# Patient Record
Sex: FEMALE | Race: WHITE | NOT HISPANIC OR LATINO | URBAN - METROPOLITAN AREA
[De-identification: names, ages, dates, MRNs, and addresses within clinical notes are randomized per-mention and may not be internally consistent; named-entity substitution may affect disease eponyms.]

---

## 2017-07-20 ENCOUNTER — EMERGENCY (EMERGENCY)
Facility: HOSPITAL | Age: 67
LOS: 1 days | Discharge: OTHER INSTITUTION | End: 2017-07-20
Attending: EMERGENCY MEDICINE | Admitting: EMERGENCY MEDICINE
Payer: MEDICARE

## 2017-07-20 ENCOUNTER — INPATIENT (INPATIENT)
Facility: HOSPITAL | Age: 67
LOS: 7 days | Discharge: NURSING, REHAB OR LONG-TERM CARE | DRG: 871 | End: 2017-07-28
Attending: SPECIALIST | Admitting: SPECIALIST
Payer: MEDICARE

## 2017-07-20 VITALS
SYSTOLIC BLOOD PRESSURE: 129 MMHG | HEART RATE: 118 BPM | TEMPERATURE: 99 F | RESPIRATION RATE: 18 BRPM | OXYGEN SATURATION: 94 % | DIASTOLIC BLOOD PRESSURE: 67 MMHG

## 2017-07-20 VITALS
OXYGEN SATURATION: 97 % | SYSTOLIC BLOOD PRESSURE: 145 MMHG | DIASTOLIC BLOOD PRESSURE: 78 MMHG | HEART RATE: 127 BPM | TEMPERATURE: 103 F | RESPIRATION RATE: 18 BRPM

## 2017-07-20 VITALS
SYSTOLIC BLOOD PRESSURE: 139 MMHG | RESPIRATION RATE: 18 BRPM | DIASTOLIC BLOOD PRESSURE: 73 MMHG | OXYGEN SATURATION: 94 % | HEART RATE: 128 BPM

## 2017-07-20 DIAGNOSIS — G93.89 OTHER SPECIFIED DISORDERS OF BRAIN: ICD-10-CM

## 2017-07-20 DIAGNOSIS — Z88.0 ALLERGY STATUS TO PENICILLIN: ICD-10-CM

## 2017-07-20 DIAGNOSIS — H92.01 OTALGIA, RIGHT EAR: ICD-10-CM

## 2017-07-20 DIAGNOSIS — Z88.2 ALLERGY STATUS TO SULFONAMIDES: ICD-10-CM

## 2017-07-20 DIAGNOSIS — H70.91 UNSPECIFIED MASTOIDITIS, RIGHT EAR: ICD-10-CM

## 2017-07-20 LAB
ALBUMIN SERPL ELPH-MCNC: 3.6 G/DL — SIGNIFICANT CHANGE UP (ref 3.4–5)
ALP SERPL-CCNC: 75 U/L — SIGNIFICANT CHANGE UP (ref 40–120)
ALT FLD-CCNC: 24 U/L — SIGNIFICANT CHANGE UP (ref 12–42)
ANION GAP SERPL CALC-SCNC: 9 MMOL/L — SIGNIFICANT CHANGE UP (ref 9–16)
APPEARANCE UR: CLEAR — SIGNIFICANT CHANGE UP
APTT BLD: 31.8 SEC — SIGNIFICANT CHANGE UP (ref 27.5–36.5)
AST SERPL-CCNC: 16 U/L — SIGNIFICANT CHANGE UP (ref 15–37)
BASOPHILS NFR BLD AUTO: 0.4 % — SIGNIFICANT CHANGE UP (ref 0–2)
BILIRUB SERPL-MCNC: 1.3 MG/DL — HIGH (ref 0.2–1.2)
BILIRUB UR-MCNC: NEGATIVE — SIGNIFICANT CHANGE UP
BUN SERPL-MCNC: 10 MG/DL — SIGNIFICANT CHANGE UP (ref 7–23)
CALCIUM SERPL-MCNC: 9.3 MG/DL — SIGNIFICANT CHANGE UP (ref 8.5–10.5)
CHLORIDE SERPL-SCNC: 99 MMOL/L — SIGNIFICANT CHANGE UP (ref 96–108)
CO2 SERPL-SCNC: 26 MMOL/L — SIGNIFICANT CHANGE UP (ref 22–31)
COLOR SPEC: YELLOW — SIGNIFICANT CHANGE UP
CREAT SERPL-MCNC: 0.81 MG/DL — SIGNIFICANT CHANGE UP (ref 0.5–1.3)
DIFF PNL FLD: (no result)
EOSINOPHIL NFR BLD AUTO: 0 % — SIGNIFICANT CHANGE UP (ref 0–6)
GLUCOSE SERPL-MCNC: 118 MG/DL — HIGH (ref 70–99)
GLUCOSE UR QL: NEGATIVE — SIGNIFICANT CHANGE UP
GRAM STN FLD: SIGNIFICANT CHANGE UP
GRAM STN FLD: SIGNIFICANT CHANGE UP
HCT VFR BLD CALC: 40.9 % — SIGNIFICANT CHANGE UP (ref 34.5–45)
HGB BLD-MCNC: 13.8 G/DL — SIGNIFICANT CHANGE UP (ref 11.5–15.5)
IMM GRANULOCYTES NFR BLD AUTO: 0.7 % — SIGNIFICANT CHANGE UP (ref 0–1.5)
INR BLD: 1.07 — SIGNIFICANT CHANGE UP (ref 0.88–1.16)
KETONES UR-MCNC: NEGATIVE — SIGNIFICANT CHANGE UP
LACTATE SERPL-SCNC: 1.4 MMOL/L — SIGNIFICANT CHANGE UP (ref 0.4–2)
LEUKOCYTE ESTERASE UR-ACNC: NEGATIVE — SIGNIFICANT CHANGE UP
LYMPHOCYTES # BLD AUTO: 6.2 % — LOW (ref 13–44)
MCHC RBC-ENTMCNC: 28.2 PG — SIGNIFICANT CHANGE UP (ref 27–34)
MCHC RBC-ENTMCNC: 33.7 G/DL — SIGNIFICANT CHANGE UP (ref 32–36)
MCV RBC AUTO: 83.6 FL — SIGNIFICANT CHANGE UP (ref 80–100)
MONOCYTES NFR BLD AUTO: 2 % — SIGNIFICANT CHANGE UP (ref 2–14)
NEUTROPHILS NFR BLD AUTO: 90.7 % — HIGH (ref 43–77)
NITRITE UR-MCNC: NEGATIVE — SIGNIFICANT CHANGE UP
PH UR: 5 — SIGNIFICANT CHANGE UP (ref 5–8)
PLATELET # BLD AUTO: 144 K/UL — LOW (ref 150–400)
POTASSIUM SERPL-MCNC: 3.8 MMOL/L — SIGNIFICANT CHANGE UP (ref 3.5–5.3)
POTASSIUM SERPL-SCNC: 3.8 MMOL/L — SIGNIFICANT CHANGE UP (ref 3.5–5.3)
PROT SERPL-MCNC: 8.4 G/DL — HIGH (ref 6.4–8.2)
PROT UR-MCNC: NEGATIVE MG/DL — SIGNIFICANT CHANGE UP
PROTHROM AB SERPL-ACNC: 11.8 SEC — SIGNIFICANT CHANGE UP (ref 9.8–12.7)
RBC # BLD: 4.89 M/UL — SIGNIFICANT CHANGE UP (ref 3.8–5.2)
RBC # FLD: 13 % — SIGNIFICANT CHANGE UP (ref 10.3–16.9)
SODIUM SERPL-SCNC: 134 MMOL/L — SIGNIFICANT CHANGE UP (ref 132–145)
SP GR SPEC: 1.01 — SIGNIFICANT CHANGE UP (ref 1–1.03)
SPECIMEN SOURCE: SIGNIFICANT CHANGE UP
SPECIMEN SOURCE: SIGNIFICANT CHANGE UP
UROBILINOGEN FLD QL: 0.2 E.U./DL — SIGNIFICANT CHANGE UP
WBC # BLD: 8.1 K/UL — SIGNIFICANT CHANGE UP (ref 3.8–10.5)
WBC # FLD AUTO: 8.1 K/UL — SIGNIFICANT CHANGE UP (ref 3.8–10.5)

## 2017-07-20 PROCEDURE — 99283 EMERGENCY DEPT VISIT LOW MDM: CPT

## 2017-07-20 PROCEDURE — 99285 EMERGENCY DEPT VISIT HI MDM: CPT

## 2017-07-20 PROCEDURE — 70487 CT MAXILLOFACIAL W/DYE: CPT | Mod: 26

## 2017-07-20 RX ORDER — SODIUM CHLORIDE 9 MG/ML
2000 INJECTION INTRAMUSCULAR; INTRAVENOUS; SUBCUTANEOUS ONCE
Qty: 0 | Refills: 0 | Status: COMPLETED | OUTPATIENT
Start: 2017-07-20 | End: 2017-07-20

## 2017-07-20 RX ORDER — VANCOMYCIN HCL 1 G
1000 VIAL (EA) INTRAVENOUS ONCE
Qty: 0 | Refills: 0 | Status: COMPLETED | OUTPATIENT
Start: 2017-07-20 | End: 2017-07-20

## 2017-07-20 RX ORDER — SODIUM CHLORIDE 9 MG/ML
1000 INJECTION INTRAMUSCULAR; INTRAVENOUS; SUBCUTANEOUS ONCE
Qty: 0 | Refills: 0 | Status: COMPLETED | OUTPATIENT
Start: 2017-07-20 | End: 2017-07-20

## 2017-07-20 RX ORDER — DEXAMETHASONE 0.5 MG/5ML
10 ELIXIR ORAL ONCE
Qty: 0 | Refills: 0 | Status: COMPLETED | OUTPATIENT
Start: 2017-07-20 | End: 2017-07-20

## 2017-07-20 RX ORDER — OXYMETAZOLINE HYDROCHLORIDE 0.5 MG/ML
1 SPRAY NASAL ONCE
Qty: 0 | Refills: 0 | Status: COMPLETED | OUTPATIENT
Start: 2017-07-20 | End: 2017-07-20

## 2017-07-20 RX ORDER — IBUPROFEN 200 MG
800 TABLET ORAL ONCE
Qty: 0 | Refills: 0 | Status: COMPLETED | OUTPATIENT
Start: 2017-07-20 | End: 2017-07-20

## 2017-07-20 RX ORDER — SODIUM CHLORIDE 9 MG/ML
1000 INJECTION INTRAMUSCULAR; INTRAVENOUS; SUBCUTANEOUS
Qty: 0 | Refills: 0 | Status: DISCONTINUED | OUTPATIENT
Start: 2017-07-20 | End: 2017-07-24

## 2017-07-20 RX ORDER — ACETAMINOPHEN 500 MG
1000 TABLET ORAL ONCE
Qty: 0 | Refills: 0 | Status: COMPLETED | OUTPATIENT
Start: 2017-07-20 | End: 2017-07-20

## 2017-07-20 RX ORDER — ONDANSETRON 8 MG/1
8 TABLET, FILM COATED ORAL ONCE
Qty: 0 | Refills: 0 | Status: COMPLETED | OUTPATIENT
Start: 2017-07-20 | End: 2017-07-20

## 2017-07-20 RX ORDER — ACETAMINOPHEN 500 MG
975 TABLET ORAL ONCE
Qty: 0 | Refills: 0 | Status: COMPLETED | OUTPATIENT
Start: 2017-07-20 | End: 2017-07-20

## 2017-07-20 RX ORDER — SODIUM CHLORIDE 9 MG/ML
3 INJECTION INTRAMUSCULAR; INTRAVENOUS; SUBCUTANEOUS ONCE
Qty: 0 | Refills: 0 | Status: COMPLETED | OUTPATIENT
Start: 2017-07-20 | End: 2017-07-20

## 2017-07-20 RX ORDER — METOCLOPRAMIDE HCL 10 MG
10 TABLET ORAL ONCE
Qty: 0 | Refills: 0 | Status: COMPLETED | OUTPATIENT
Start: 2017-07-20 | End: 2017-07-20

## 2017-07-20 RX ADMIN — Medication 800 MILLIGRAM(S): at 15:06

## 2017-07-20 RX ADMIN — Medication 250 MILLIGRAM(S): at 14:41

## 2017-07-20 RX ADMIN — SODIUM CHLORIDE 2000 MILLILITER(S): 9 INJECTION INTRAMUSCULAR; INTRAVENOUS; SUBCUTANEOUS at 13:03

## 2017-07-20 RX ADMIN — Medication 50 MILLIGRAM(S): at 14:40

## 2017-07-20 RX ADMIN — Medication 100 MILLIGRAM(S): at 11:35

## 2017-07-20 RX ADMIN — Medication 975 MILLIGRAM(S): at 11:19

## 2017-07-20 RX ADMIN — Medication 800 MILLIGRAM(S): at 15:35

## 2017-07-20 RX ADMIN — Medication 10 MILLIGRAM(S): at 14:40

## 2017-07-20 RX ADMIN — SODIUM CHLORIDE 4000 MILLILITER(S): 9 INJECTION INTRAMUSCULAR; INTRAVENOUS; SUBCUTANEOUS at 11:13

## 2017-07-20 RX ADMIN — SODIUM CHLORIDE 3 MILLILITER(S): 9 INJECTION INTRAMUSCULAR; INTRAVENOUS; SUBCUTANEOUS at 11:13

## 2017-07-20 RX ADMIN — OXYMETAZOLINE HYDROCHLORIDE 1 SPRAY(S): 0.5 SPRAY NASAL at 11:53

## 2017-07-20 RX ADMIN — ONDANSETRON 8 MILLIGRAM(S): 8 TABLET, FILM COATED ORAL at 12:22

## 2017-07-20 RX ADMIN — SODIUM CHLORIDE 150 MILLILITER(S): 9 INJECTION INTRAMUSCULAR; INTRAVENOUS; SUBCUTANEOUS at 15:35

## 2017-07-20 RX ADMIN — Medication 400 MILLIGRAM(S): at 19:59

## 2017-07-20 NOTE — ED PROVIDER NOTE - ENMT, MLM
Airway patent, Nasal mucosa clear. Mouth with normal mucosa. Throat has no vesicles, no oropharyngeal exudates and uvula is midline. TM intact b/l, +purulent collection in the R TM, dull and slightly bulging, no d/c, bleeding, crepitus, +mastoid and R frontal and maxillary sinus tenderness on percussion, no focal fluctuance or d/c noted otherwise, hearing intact b/l

## 2017-07-20 NOTE — ED PROVIDER NOTE - MEDICAL DECISION MAKING DETAILS
pt with fever, R sided earaches x 2d, sinus and allergy sx x 2 wks, noted febrile to 103 today, R TM bulging with purulent collection noted, labs with L shift but no leukocytosis, LA wnl, CT with +mastoiditis and parasinus dz, with INTRACRANIAL air likely from tegmen defect, case discussed with ENT service, Dr. Joya, desires ER - ER transfer for further evaluation and tx, s/p IV clindamycin, levaquin, and vanco in the ED here (due to PCN/?cephalosporin/sulfa drug allergies), results and plan discussed, pt agrees w transfer to [facility], sign out given to Dr. Maia DALE at [facility]

## 2017-07-20 NOTE — ED ADULT NURSE NOTE - CHIEF COMPLAINT QUOTE
Transfer from [facility] for sepsis. Ear pain x 2 days. Per report CT shows "air in brain."

## 2017-07-20 NOTE — CONSULT NOTE ADULT - SUBJECTIVE AND OBJECTIVE BOX
HPI: 66y Female  Transfer from [facility] after ENT was consulted for R otalgia and temporal and frontal fossa pneumocephalus on CT max-fac.   Reports that she had been suffering from seasonal allergies for 2 weeks and using a neti-pot and saline spray to rinse her sinus. However, 2 days ago, she suddenly developed moderate/severe otalgia and headache on the right. Endorses excessive blowing of nose during this period. Denies trauma. Pt had been seen at urgent care, but no instrumentation of her ear or sinuses.     Allergies    penicillin (serum sickness)  sulfa drugs (Unknown)    PAST MEDICAL & SURGICAL HISTORY:  Abdominal hernia  Hypothyroid  No significant past surgical history    Tobacco History:denies    MEDICATIONS:  Anti-infectives: received vanc, clinda, levaquin at [facility]    Vital Signs Last 24 Hrs  T(C): 37.7 (20 Jul 2017 19:59), Max: 39.5 (20 Jul 2017 10:57)  T(F): 99.9 (20 Jul 2017 19:59), Max: 103.1 (20 Jul 2017 10:57)  HR: 110 (20 Jul 2017 19:59) (110 - 130)  BP: 119/65 (20 Jul 2017 19:59) (119/65 - 145/78)  BP(mean): --  RR: 18 (20 Jul 2017 19:59) (17 - 18)  SpO2: 94% (20 Jul 2017 19:59) (94% - 99%)    LABS:  CBC-  134  |  99  |  10  ----------------------------<  118<H>  3.8   |  26  |  0.81    Ca    9.3      20 Jul 2017 11:18    TPro  8.4<H>  /  Alb  3.6  /  TBili  1.3<H>  /  DBili  x   /  AST  16  /  ALT  24  /  AlkPhos  75  07-20    Cultures collected at [facility]    PHYSICAL EXAM:    ENT EXAM-   NAD, conversant, respond appropriately during interview, diaphoretic, comfortable at rest  Neuro/Psych:  A&O x 3.  Mood stable.  Affect bright.  Cranial nerves: 2-12 grossly intact bilaterally.  Eyes:  EOMI, no nystagmus.  Skin:  No rash or lesions on exposed skin of head/neck    NASAL ENDOSCOPY  -Verbal consent obtained from patient prior to exam.   Findings:   -- Inferior turbinates normal bilateral.  -- Septum was midline      -- No polyps either side  -- purulence Mucous middle meatus bilateral R>L  -- Nasopharynx without mass or exudate  -- Eustachian orifices were clear bilateral    The patient tolerated the procedure well.  -- nasal swab collected from right middle meatus    Otoscopy  AS normal  AD - TM erythematous, slight bulging  serous effusion on the inferior 1/5 of the TM    CT head d/w Dr Stodadrd  Thin skullbase over bilateral mastoid and sphenoid,   Extradural and intradural air in left temporal and frontal region    Assessment/Plan:    - Pt will likely need admission  - level of care pending Neurosurgical consult  - discussed with ID attending on call Dr Maldonado >            cefepime 2g IV q8           Flagyl 500mg q6  - f/u cultures  - No for surgical intervention at this point    See with Fellow and Chief  Page ENT at [phone] with any questions/concerns. HPI: 66y Female  Transfer from [facility] after ENT was consulted for R otalgia and temporal and frontal fossa pneumocephalus on CT max-fac.   Reports that she had been suffering from seasonal allergies for 2 weeks and using a neti-pot and saline spray to rinse her sinus. However, 2 days ago, she suddenly developed moderate/severe otalgia and headache on the right. Endorses excessive blowing of nose during this period. Denies trauma. Pt had been seen at urgent care, but no instrumentation of her ear or sinuses.     Allergies    penicillin (serum sickness)  sulfa drugs (Unknown)    PAST MEDICAL & SURGICAL HISTORY:  Abdominal hernia  Hypothyroid  No significant past surgical history    Tobacco History:denies    MEDICATIONS:  Anti-infectives: received vanc, clinda, levaquin at [facility]    Vital Signs Last 24 Hrs  T(C): 37.7 (20 Jul 2017 19:59), Max: 39.5 (20 Jul 2017 10:57)  T(F): 99.9 (20 Jul 2017 19:59), Max: 103.1 (20 Jul 2017 10:57)  HR: 110 (20 Jul 2017 19:59) (110 - 130)  BP: 119/65 (20 Jul 2017 19:59) (119/65 - 145/78)  BP(mean): --  RR: 18 (20 Jul 2017 19:59) (17 - 18)  SpO2: 94% (20 Jul 2017 19:59) (94% - 99%)    LABS:  CBC-  134  |  99  |  10  ----------------------------<  118<H>  3.8   |  26  |  0.81    Ca    9.3      20 Jul 2017 11:18    TPro  8.4<H>  /  Alb  3.6  /  TBili  1.3<H>  /  DBili  x   /  AST  16  /  ALT  24  /  AlkPhos  75  07-20    Cultures collected at [facility]    PHYSICAL EXAM:    ENT EXAM-   NAD, conversant, respond appropriately during interview, diaphoretic, comfortable at rest  Neuro/Psych:  A&O x 3.  Mood stable.  Affect bright.  Cranial nerves: 2-12 grossly intact bilaterally.  Eyes:  EOMI, no nystagmus.  Skin:  No rash or lesions on exposed skin of head/neck    NASAL ENDOSCOPY  -Verbal consent obtained from patient prior to exam.   Findings:   -- Inferior turbinates normal bilateral.  -- Septum was midline      -- No polyps either side  -- purulence Mucous middle meatus bilateral R>L  -- Nasopharynx without mass or exudate  -- Eustachian orifices were clear bilateral    The patient tolerated the procedure well.  -- nasal swab collected from right middle meatus    Otoscopy  AS normal  AD - TM erythematous, slight bulging  serous effusion on the inferior 1/5 of the TM    CT head d/w Dr Stoddard  Thin skullbase over bilateral mastoid and sphenoid,   Extradural and intradural air in left temporal and frontal region    Assessment/Plan:    - Pt will likely need admission  - level of care pending Neurosurgical consult  - discussed with ID attending on call Dr Maldonado >            cefepime 2g IV q8           Flagyl 500mg q6           Vanco 1g q12  - f/u cultures  - No for surgical intervention at this point    See with Fellow and Chief  Page ENT at [phone] with any questions/concerns.

## 2017-07-20 NOTE — ED ADULT NURSE REASSESSMENT NOTE - NEURO WDL
Alert and oriented to person, place and time, memory intact, behavior appropriate to situation, PERRL.  GCS 15

## 2017-07-20 NOTE — ED PROVIDER NOTE - CARE PLAN
Principal Discharge DX:	Mastoiditis of right side Principal Discharge DX:	Mastoiditis of right side  Secondary Diagnosis:	Intracranial injury, without LOC, initial encounter Principal Discharge DX:	Mastoiditis of right side  Secondary Diagnosis:	Pneumocephalus

## 2017-07-20 NOTE — ED PROVIDER NOTE - OBJECTIVE STATEMENT
66 yof found to have mastoiditis and sinusitis, febrile, initially seen at [facility], now transferred here for ENT evaluation.  pt rec'd fluids and iv abx PTA.

## 2017-07-20 NOTE — ED ADULT NURSE NOTE - OBJECTIVE STATEMENT
Pt sent in from urgent care for further evaluation of ear pain and fever of 103F. Pt reports right sided ear pain radiating to jaw with sinus pressure x2days. Pt denies any N/V/D, no rash, no CP/SOB. Pt reports intermittent nauseousness, no emesis or diarrhea. Pt is A&Ox3, neuro/sensory intact. Code Sepsis was called for fever and tachycardia. Sepsis protocol followed.

## 2017-07-20 NOTE — ED PROVIDER NOTE - PROGRESS NOTE DETAILS
notified by radiologist for +intracranial air on CT MF, pt reassessed at bedside, no R ear perforation, d/c, bleeding noted, pt reports no recent instrumentation or trauma, ENT consulted, fellow Dr. Joya in OR currently, case discussed with ENT resident, awaiting call back for acceptance for transfer to [facility], results and tx plan discussed w pt notified by radiologist for +intracranial air on CT MF, pt reassessed at bedside, no R ear perforation, d/c, bleeding noted, no meningismus signs, neck supple and NT, FROM, pt reports no recent instrumentation or trauma, ENT consulted, fellow Dr. Joya in OR currently, case discussed with ENT resident, awaiting call back for acceptance for transfer to [facility], results and tx plan discussed w pt case discussed with ENT fellow Dr. Mccarty via transfer center, agrees to ENT

## 2017-07-20 NOTE — ED PROVIDER NOTE - CONSTITUTIONAL, MLM
normal... Well appearing, well nourished F, awake, alert, oriented to person, place, time/situation and in no apparent distress.

## 2017-07-20 NOTE — ED ADULT NURSE REASSESSMENT NOTE - NS ED NURSE REASSESS COMMENT FT1
Pt is being taken to [facility] ER, RN report given to Charge SAFIA Roman. Pt remains stable, in NAD.

## 2017-07-20 NOTE — ED ADULT TRIAGE NOTE - CHIEF COMPLAINT QUOTE
Transfer from [facility] for sepsis. Ear pain x 2 days. Per report CT shows "air in brain."

## 2017-07-20 NOTE — ED ADULT NURSE NOTE - OBJECTIVE STATEMENT
Pt transfer from [facility] for right sided mastoiditis and intracranial injury w/o LOC. Pt reports 6/10 bilat temporal HA with some weakness. ENT at bedside. Pt placed on cardiac monitor, will maintain safety. Friend at bedside.

## 2017-07-20 NOTE — ED CLERICAL - NS ED CLERK NOTE PRE-ARRIVAL INFORMATION; ADDITIONAL PRE-ARRIVAL INFORMATION
Amelia Fields- 65yo f, h/o hypothyroid, with fever/ r ear pain x 2 d. CT + for mastoiditis, intracranial air. Given vanc, clinda, levaquin, dexamethasone. Dr. Joya ENT fellow (DR. ARROYO)

## 2017-07-20 NOTE — CONSULT NOTE ADULT - SUBJECTIVE AND OBJECTIVE BOX
HISTORY OF PRESENT ILLNESS:   67y/o F with PMHx sig for hypothyroidism, abdominal hernia presents from [facility] with complaints of acute right-sided headache, which started yesterday. Patients states that 2 weeks ago she had what she had thought was seasonal allergies for which she took sudafed and Claritin with no alleviation. Patient states one week ago, she started developing a "cold" and used a neti pot which drained only clear fluid and did not resolve the sinus congestion. Pt states she consistently kept blowing her nose, feeling it was "stuffed and nothing was coming out." Yesterday, Pt states she developed a right earache that radiated to the right side of her head causing "migraine-like" headaches. Pt states she saw her PCP today who recommended she go to the ER after suspecting an ear infection. Patient was febrile 103F and tachy 140s. Per patient, antibiotics were given to her and started at [facility]. CT maxillofacial revealed "acute pansinusitis and right mastoiditis...intracranial air that has extended through a presumed tegmen defect, possibly related to excessive nose blowing with transmitted pressure through the eustachian tube." Pt was transferred to [facility] for ENT evaluation. Pt currently denies changes in vision, chills, SOB, CP, nuchal rigidity, photophobia, weakness/loss of sensation of extremities, rhinorrhea, salty taste behind throat.    PAST MEDICAL & SURGICAL HISTORY:  Abdominal hernia  Hypothyroid  No significant past surgical history    FAMILY HISTORY:    SOCIAL HISTORY:  Tobacco Use:  EtOH use:   Substance:    Allergies    penicillin (Unknown)  sulfa drugs (Unknown)    Intolerances    REVIEW OF SYSTEMS  See HPI    MEDICATIONS:  Antibiotics:    Neuro:    Anticoagulation:    OTHER:    IVF:  sodium chloride 0.9%. 1000 milliLiter(s) IV Continuous <Continuous>      Vital Signs Last 24 Hrs  T(C): 37.2 (2017 22:02), Max: 39.5 (2017 10:57)  T(F): 99 (2017 22:02), Max: 103.1 (2017 10:57)  HR: 108 (2017 22:02) (108 - 130)  BP: 127/77 (2017 22:02) (119/65 - 145/78)  BP(mean): --  RR: 18 (2017 22:02) (17 - 18)  SpO2: 95% (2017 22:02) (94% - 99%)    PHYSICAL EXAM:  Constitutional: No acute distress  Eyes: Sclera non-erythematous  ENTMT: No obvious rhinorrhea, ear discharge or discharge behind throat noted  Neck: Nontender to touch, ROM intact  Respiratory: RRR  Cardiovascular: Normal S1, S1 w/o gallops, thrills  Gastrointestinal: Abdominal binder in place, abdominal hernia  Neurological: AAOx3, FC, speech coherent  CNII-XII: EOM intact, PERRL  Motor: MAEx4 5/5 UE and LE B/L    LABS:                        13.8   8.1   )-----------( 144      ( 2017 11:18 )             40.9     07-20    134  |  99  |  10  ----------------------------<  118<H>  3.8   |  26  |  0.81    Ca    9.3      2017 11:18    TPro  8.4<H>  /  Alb  3.6  /  TBili  1.3<H>  /  DBili  x   /  AST  16  /  ALT  24  /  AlkPhos  75  07-20    PT/INR - ( 2017 15:11 )   PT: 11.8 sec;   INR: 1.07          PTT - ( 2017 15:11 )  PTT:31.8 sec  Urinalysis Basic - ( 2017 12:28 )    Color: Yellow / Appearance: Clear / S.010 / pH: x  Gluc: x / Ketone: NEGATIVE  / Bili: NEGATIVE / Urobili: 0.2 E.U./dL   Blood: x / Protein: NEGATIVE mg/dL / Nitrite: NEGATIVE   Leuk Esterase: NEGATIVE / RBC: 5-10 /HPF / WBC x   Sq Epi: x / Non Sq Epi: x / Bacteria: x    CULTURES:    RADIOLOGY & ADDITIONAL STUDIES:  CT maxillofacial:   In this patient with acute pansinusitis and right mastoiditis, there is   intracranial air that has extended through a presumed tegmen defect,   possibly related to excessive nose blowing with transmitted pressure   through the eustachian tube. Dedicated temporal bone CT is recommended to   identify the tegmen defect. Follow-up brain imaging is recommended to   rule out intracranial infectious complications.

## 2017-07-20 NOTE — CONSULT NOTE ADULT - ASSESSMENT
65y/o F with acute pansinusitis and right mastoiditis with intracranial pneumocephalus    PLAN:  -No neurosurgical intervention at this time, neuro checks  -Care as per ENT  -Would recommend broad spectrum antibiotics for meningitis coverage  -f/u ID consult, f/u blood culture  -D/w Dr. Moreland

## 2017-07-20 NOTE — ED PROVIDER NOTE - ATTENDING CONTRIBUTION TO CARE
I have personally seen and examined this patient. I have fully participated in the care of this patient. I have reviewed all pertinent clinical information, including history, physical exam and plan.    the patient's friend/advocate was present in the room when I spoke w the patient

## 2017-07-20 NOTE — CONSULT NOTE ADULT - ATTENDING COMMENTS
65 yo hypothyroid female with fever, ear and head pain, confusion with MR evidence of R frontal and R mastoid infection.  Pus from 3ear positive for staph.  Agree with triple ABx.  Needs PICC line for long term Abx.  Would consider mastoidectomy with ? frontal sinus exploration after full antibiotic course.  Pt seen with PAs

## 2017-07-20 NOTE — ED PROVIDER NOTE - OBJECTIVE STATEMENT
67 yo F with PMHx of hypothyroidism, abdominal hernia, 67 yo F with PMHx of hypothyroidism, abdominal hernia, sent from  for evaluation of fever, earaches and sinus pain x 2d.  Pt reports having allergies sx x 2 wks with sinus congestion and sneezing and has been using saline drops without improvement.  Noted worsening sinus pressures, R ear pain x 2d.  Noted temp of 103F today and sent from .  Given dose of po levaquin PTA to the ED.  Denies chills, N/V/D/C, d/c, bleeding, HA, dizziness, tinnitus, neck pain, focal weakness, rash, cough, congestion, CP, SOB, palpitations, sore throat, abdominal pain, change in urinary/bowel function, dysuria, hematuria, flank pain, and malaise.  No recent travel or sick contact noted 65 yo F with PMHx of hypothyroidism, abdominal hernia, sent from  for evaluation of fever, earaches and sinus pain x 2d.  Pt reports having allergies sx x 2 wks with sinus congestion and sneezing and has been using saline drops and irrigation system without improvement.  Noted worsening sinus pressures, R ear pain x 2d.  Noted temp of 103F today and sent from .  Given dose of po levaquin PTA to the ED.  Denies chills, N/V/D/C, d/c, bleeding, HA, dizziness, tinnitus, neck pain, focal weakness, rash, cough, congestion, CP, SOB, palpitations, sore throat, abdominal pain, change in urinary/bowel function, dysuria, hematuria, flank pain, and malaise.  No recent travel or sick contact noted

## 2017-07-21 DIAGNOSIS — G93.89 OTHER SPECIFIED DISORDERS OF BRAIN: ICD-10-CM

## 2017-07-21 LAB
-  CANDIDA ALBICANS: SIGNIFICANT CHANGE UP
-  CANDIDA GLABRATA: SIGNIFICANT CHANGE UP
-  CANDIDA KRUSEI: SIGNIFICANT CHANGE UP
-  CANDIDA PARAPSILOSIS: SIGNIFICANT CHANGE UP
-  CANDIDA TROPICALIS: SIGNIFICANT CHANGE UP
-  COAGULASE NEGATIVE STAPHYLOCOCCUS: SIGNIFICANT CHANGE UP
-  K. PNEUMONIAE GROUP: SIGNIFICANT CHANGE UP
-  KPC RESISTANCE GENE: SIGNIFICANT CHANGE UP
-  STREPTOCOCCUS SP. (NOT GRP A, B OR S PNEUMONIAE): SIGNIFICANT CHANGE UP
A BAUMANNII DNA SPEC QL NAA+PROBE: SIGNIFICANT CHANGE UP
ANION GAP SERPL CALC-SCNC: 11 MMOL/L — SIGNIFICANT CHANGE UP (ref 5–17)
BUN SERPL-MCNC: 10 MG/DL — SIGNIFICANT CHANGE UP (ref 7–23)
CALCIUM SERPL-MCNC: 8.8 MG/DL — SIGNIFICANT CHANGE UP (ref 8.4–10.5)
CHLORIDE SERPL-SCNC: 99 MMOL/L — SIGNIFICANT CHANGE UP (ref 96–108)
CO2 SERPL-SCNC: 23 MMOL/L — SIGNIFICANT CHANGE UP (ref 22–31)
CREAT SERPL-MCNC: 0.6 MG/DL — SIGNIFICANT CHANGE UP (ref 0.5–1.3)
E CLOAC COMP DNA BLD POS QL NAA+PROBE: SIGNIFICANT CHANGE UP
E COLI DNA BLD POS QL NAA+NON-PROBE: SIGNIFICANT CHANGE UP
ENTEROCOC DNA BLD POS QL NAA+NON-PROBE: SIGNIFICANT CHANGE UP
ENTEROCOC DNA BLD POS QL NAA+NON-PROBE: SIGNIFICANT CHANGE UP
GLUCOSE SERPL-MCNC: 117 MG/DL — HIGH (ref 70–99)
GP B STREP DNA BLD POS QL NAA+NON-PROBE: SIGNIFICANT CHANGE UP
GRAM STN FLD: SIGNIFICANT CHANGE UP
GRAM STN FLD: SIGNIFICANT CHANGE UP
HAEM INFLU DNA BLD POS QL NAA+NON-PROBE: SIGNIFICANT CHANGE UP
HCT VFR BLD CALC: 34 % — LOW (ref 34.5–45)
HGB BLD-MCNC: 11.5 G/DL — SIGNIFICANT CHANGE UP (ref 11.5–15.5)
K OXYTOCA DNA BLD POS QL NAA+NON-PROBE: SIGNIFICANT CHANGE UP
L MONOCYTOG DNA BLD POS QL NAA+NON-PROBE: SIGNIFICANT CHANGE UP
MAGNESIUM SERPL-MCNC: 1.8 MG/DL — SIGNIFICANT CHANGE UP (ref 1.6–2.6)
MCHC RBC-ENTMCNC: 28 PG — SIGNIFICANT CHANGE UP (ref 27–34)
MCHC RBC-ENTMCNC: 33.8 G/DL — SIGNIFICANT CHANGE UP (ref 32–36)
MCV RBC AUTO: 82.9 FL — SIGNIFICANT CHANGE UP (ref 80–100)
METHOD TYPE: SIGNIFICANT CHANGE UP
MRSA SPEC QL CULT: SIGNIFICANT CHANGE UP
MSSA DNA SPEC QL NAA+PROBE: SIGNIFICANT CHANGE UP
N MEN ISLT CULT: SIGNIFICANT CHANGE UP
P AERUGINOSA DNA BLD POS NAA+NON-PROBE: SIGNIFICANT CHANGE UP
PHOSPHATE SERPL-MCNC: 1.6 MG/DL — LOW (ref 2.5–4.5)
PLATELET # BLD AUTO: 149 K/UL — LOW (ref 150–400)
POTASSIUM SERPL-MCNC: 4.1 MMOL/L — SIGNIFICANT CHANGE UP (ref 3.5–5.3)
POTASSIUM SERPL-SCNC: 4.1 MMOL/L — SIGNIFICANT CHANGE UP (ref 3.5–5.3)
PROTEUS SP DNA BLD POS QL NAA+NON-PROBE: SIGNIFICANT CHANGE UP
RBC # BLD: 4.1 M/UL — SIGNIFICANT CHANGE UP (ref 3.8–5.2)
RBC # FLD: 13.4 % — SIGNIFICANT CHANGE UP (ref 10.3–16.9)
S MARCESCENS DNA BLD POS NAA+NON-PROBE: SIGNIFICANT CHANGE UP
S PNEUM DNA BLD POS QL NAA+NON-PROBE: SIGNIFICANT CHANGE UP
S PYO DNA BLD POS QL NAA+NON-PROBE: SIGNIFICANT CHANGE UP
SODIUM SERPL-SCNC: 133 MMOL/L — LOW (ref 135–145)
SPECIMEN SOURCE: SIGNIFICANT CHANGE UP
VANCOMYCIN TROUGH SERPL-MCNC: 10.8 UG/ML — SIGNIFICANT CHANGE UP (ref 10–20)
WBC # BLD: 8.9 K/UL — SIGNIFICANT CHANGE UP (ref 3.8–10.5)
WBC # FLD AUTO: 8.9 K/UL — SIGNIFICANT CHANGE UP (ref 3.8–10.5)

## 2017-07-21 RX ORDER — SODIUM CHLORIDE 9 MG/ML
1000 INJECTION, SOLUTION INTRAVENOUS
Qty: 0 | Refills: 0 | Status: DISCONTINUED | OUTPATIENT
Start: 2017-07-21 | End: 2017-07-21

## 2017-07-21 RX ORDER — SODIUM CHLORIDE 9 MG/ML
1000 INJECTION, SOLUTION INTRAVENOUS
Qty: 0 | Refills: 0 | Status: DISCONTINUED | OUTPATIENT
Start: 2017-07-21 | End: 2017-07-23

## 2017-07-21 RX ORDER — ONDANSETRON 8 MG/1
4 TABLET, FILM COATED ORAL EVERY 6 HOURS
Qty: 0 | Refills: 0 | Status: DISCONTINUED | OUTPATIENT
Start: 2017-07-21 | End: 2017-07-24

## 2017-07-21 RX ORDER — CEFEPIME 1 G/1
2000 INJECTION, POWDER, FOR SOLUTION INTRAMUSCULAR; INTRAVENOUS EVERY 8 HOURS
Qty: 0 | Refills: 0 | Status: DISCONTINUED | OUTPATIENT
Start: 2017-07-21 | End: 2017-07-24

## 2017-07-21 RX ORDER — METRONIDAZOLE 500 MG
500 TABLET ORAL EVERY 6 HOURS
Qty: 0 | Refills: 0 | Status: DISCONTINUED | OUTPATIENT
Start: 2017-07-21 | End: 2017-07-27

## 2017-07-21 RX ORDER — CEFEPIME 1 G/1
1000 INJECTION, POWDER, FOR SOLUTION INTRAMUSCULAR; INTRAVENOUS ONCE
Qty: 0 | Refills: 0 | Status: COMPLETED | OUTPATIENT
Start: 2017-07-21 | End: 2017-07-21

## 2017-07-21 RX ORDER — VANCOMYCIN HCL 1 G
1250 VIAL (EA) INTRAVENOUS EVERY 8 HOURS
Qty: 0 | Refills: 0 | Status: DISCONTINUED | OUTPATIENT
Start: 2017-07-21 | End: 2017-07-24

## 2017-07-21 RX ORDER — LEVOTHYROXINE SODIUM 125 MCG
150 TABLET ORAL DAILY
Qty: 0 | Refills: 0 | Status: DISCONTINUED | OUTPATIENT
Start: 2017-07-21 | End: 2017-07-28

## 2017-07-21 RX ORDER — METRONIDAZOLE 500 MG
500 TABLET ORAL EVERY 8 HOURS
Qty: 0 | Refills: 0 | Status: DISCONTINUED | OUTPATIENT
Start: 2017-07-21 | End: 2017-07-21

## 2017-07-21 RX ORDER — ACETAMINOPHEN 500 MG
325 TABLET ORAL EVERY 4 HOURS
Qty: 0 | Refills: 0 | Status: DISCONTINUED | OUTPATIENT
Start: 2017-07-21 | End: 2017-07-28

## 2017-07-21 RX ORDER — VANCOMYCIN HCL 1 G
1250 VIAL (EA) INTRAVENOUS EVERY 12 HOURS
Qty: 0 | Refills: 0 | Status: DISCONTINUED | OUTPATIENT
Start: 2017-07-21 | End: 2017-07-21

## 2017-07-21 RX ORDER — METRONIDAZOLE 500 MG
500 TABLET ORAL EVERY 6 HOURS
Qty: 0 | Refills: 0 | Status: DISCONTINUED | OUTPATIENT
Start: 2017-07-21 | End: 2017-07-21

## 2017-07-21 RX ORDER — OXYMETAZOLINE HYDROCHLORIDE 0.5 MG/ML
2 SPRAY NASAL ONCE
Qty: 0 | Refills: 0 | Status: COMPLETED | OUTPATIENT
Start: 2017-07-21 | End: 2017-07-21

## 2017-07-21 RX ORDER — OXYCODONE AND ACETAMINOPHEN 5; 325 MG/1; MG/1
1 TABLET ORAL EVERY 4 HOURS
Qty: 0 | Refills: 0 | Status: DISCONTINUED | OUTPATIENT
Start: 2017-07-21 | End: 2017-07-24

## 2017-07-21 RX ORDER — OXYMETAZOLINE HYDROCHLORIDE 0.5 MG/ML
2 SPRAY NASAL
Qty: 0 | Refills: 0 | Status: DISCONTINUED | OUTPATIENT
Start: 2017-07-21 | End: 2017-07-24

## 2017-07-21 RX ORDER — VANCOMYCIN HCL 1 G
500 VIAL (EA) INTRAVENOUS ONCE
Qty: 0 | Refills: 0 | Status: COMPLETED | OUTPATIENT
Start: 2017-07-21 | End: 2017-07-21

## 2017-07-21 RX ORDER — VANCOMYCIN HCL 1 G
1000 VIAL (EA) INTRAVENOUS EVERY 12 HOURS
Qty: 0 | Refills: 0 | Status: DISCONTINUED | OUTPATIENT
Start: 2017-07-21 | End: 2017-07-21

## 2017-07-21 RX ORDER — OXYMETAZOLINE HYDROCHLORIDE 0.5 MG/ML
SPRAY NASAL
Qty: 0 | Refills: 0 | Status: DISCONTINUED | OUTPATIENT
Start: 2017-07-21 | End: 2017-07-24

## 2017-07-21 RX ADMIN — Medication 100 MILLIGRAM(S): at 15:38

## 2017-07-21 RX ADMIN — ONDANSETRON 4 MILLIGRAM(S): 8 TABLET, FILM COATED ORAL at 17:04

## 2017-07-21 RX ADMIN — OXYMETAZOLINE HYDROCHLORIDE 2 SPRAY(S): 0.5 SPRAY NASAL at 11:15

## 2017-07-21 RX ADMIN — Medication 325 MILLIGRAM(S): at 22:31

## 2017-07-21 RX ADMIN — SODIUM CHLORIDE 75 MILLILITER(S): 9 INJECTION, SOLUTION INTRAVENOUS at 21:45

## 2017-07-21 RX ADMIN — Medication 150 MICROGRAM(S): at 08:32

## 2017-07-21 RX ADMIN — CEFEPIME 100 MILLIGRAM(S): 1 INJECTION, POWDER, FOR SOLUTION INTRAMUSCULAR; INTRAVENOUS at 10:21

## 2017-07-21 RX ADMIN — Medication 325 MILLIGRAM(S): at 11:15

## 2017-07-21 RX ADMIN — CEFEPIME 100 MILLIGRAM(S): 1 INJECTION, POWDER, FOR SOLUTION INTRAMUSCULAR; INTRAVENOUS at 21:45

## 2017-07-21 RX ADMIN — Medication 100 MILLIGRAM(S): at 13:16

## 2017-07-21 RX ADMIN — Medication 100 MILLIGRAM(S): at 06:10

## 2017-07-21 RX ADMIN — CEFEPIME 100 MILLIGRAM(S): 1 INJECTION, POWDER, FOR SOLUTION INTRAMUSCULAR; INTRAVENOUS at 14:38

## 2017-07-21 RX ADMIN — Medication 250 MILLIGRAM(S): at 06:10

## 2017-07-21 RX ADMIN — Medication 204 MILLIGRAM(S): at 21:08

## 2017-07-21 RX ADMIN — Medication 166.67 MILLIGRAM(S): at 18:59

## 2017-07-21 NOTE — CONSULT NOTE ADULT - ATTENDING COMMENTS
Pt seen and examined  Agree with the resident's evaluation and A/R with the following additions/emphasis: will maintain a broad spectrum antibiotic coverage beyond just Strep pneumo until the results of the cultures from the sinuses.

## 2017-07-21 NOTE — ED POST DISCHARGE NOTE - RESULT SUMMARY
+GPC in blood cx, pt admitted, inpt team and ID service aware of results, will continue to follow and tailor abx regimen

## 2017-07-21 NOTE — PROVIDER CONTACT NOTE (CRITICAL VALUE NOTIFICATION) - TEST AND RESULT REPORTED:
Culture Set #1: Aerobic Gram + cocci in pairs and chains  Culture Set #2: Aerobic & Anaerobic: Gram + cocci in pairs and chaints

## 2017-07-21 NOTE — CONSULT NOTE ADULT - SUBJECTIVE AND OBJECTIVE BOX
66F transfer from [facility] after ENT was consulted for R otalgia and temporal and frontal fossa pneumocephalus on CT max-fac.   Reports that she had been suffering from seasonal allergies for 2 weeks and using a neti-pot and saline spray to rinse her sinus. However, 2 days ago, she suddenly developed moderate/severe otalgia and headache on the right. Endorses excessive blowing of nose during this period. Denies trauma. Pt had been seen at urgent care, but no instrumentation of her ear or sinuses.   PMH: Abdominal hernia  Hypothyroid  No significant past surgical history  Meds cefepime, flagyl, vancomycin, Afrin, oxycodone.  PE: A&O x 3, PERRL, EOMI, no pronator drift  Ext: No focal motor deficit. 5/5 x 4  no sensory deficit to touch  CT maxillofacial: small pneumocephalus.  Assement: 65 female with acute sinusites, OMS  Plan: No neurosurgical intervention. Continue IV antiotics  Neurosurgery will follmaximilian.  Discussed with Dr. Moreland.

## 2017-07-21 NOTE — CONSULT NOTE ADULT - ASSESSMENT
66F with PMHx of hypothyroidism presented to [facility] on 7/20 at the request of urgent care center for evaluation of fever, earache and sinus pain x2d, found to have R mastoiditis,  pansinusitis and pneumocephalus. ID consulted for antibiotic management.     #Sepsis due to right mastoiditis, pansinusitis complicated by pneumocephalus and bacteremia: Patient presented with fevers and R ear pain, found to have R mastoiditis, pansinusitis and pneumocephalus on maxillofacial CT on 7/20. Etiology likely chronic sinusitis. Blood cultures at [facility] positive for Strep pneumonia (based on PCR), which is concerning for CNS involvement in the setting of pneumocephalus and for which patient needs to be treated with broad spectrum antimicrobials that also penetrate the CNS. Patient currently febrile and with labs remarkable for L shift. Exam unremarkable for meningismus. ID consulted for antibiotic management.   - continue Cefepime 2g Q8hrs, Flagyl 500mg Q8hrs, and Vancomycin 1250mg Q12hrs for broad spectrum coverage. Obtain Vancomycin trough prior to 4th dose   - follow up initial blood cultures from 7/20 for growth and sensitivities. Will tailor antibiotic regimen based on culture growth.   - obtain surveillance blood cultures    ID will follow. Discussed with Dr. Cordon and primary team. 66F with PMHx of hypothyroidism presented to [facility] on 7/20 at the request of urgent care center for evaluation of fever, earache and sinus pain x2d, found to have R mastoiditis,  pansinusitis and pneumocephalus. ID consulted for antibiotic management.     #Sepsis due to right mastoiditis, pansinusitis complicated by pneumocephalus and bacteremia: Patient presented with fevers and R ear pain, found to have R mastoiditis, pansinusitis and pneumocephalus on maxillofacial CT on 7/20. Etiology likely chronic sinusitis. Blood cultures at [facility] positive for Strep pneumonia (based on PCR), which is concerning for CNS involvement in the setting of pneumocephalus and for which patient needs to be treated with broad spectrum antimicrobials that also penetrate the CNS. Patient currently febrile and with labs remarkable for L shift. Exam unremarkable for meningismus. ID consulted for antibiotic management.   - continue Cefepime 2g Q8hrs, Flagyl 500mg Q6hrs, and Vancomycin 1250mg Q12hrs for broad spectrum coverage. Obtain Vancomycin trough prior to 4th dose   - follow up initial blood cultures from 7/20 for growth and sensitivities. Will tailor antibiotic regimen based on culture growth.   - obtain surveillance blood cultures now    ID will follow. Discussed with Dr. Cordon and primary team.

## 2017-07-21 NOTE — CONSULT NOTE ADULT - SUBJECTIVE AND OBJECTIVE BOX
HPI:   66F with PMHx of hypothyroidism presented to [facility] on  at the request of urgent care center for evaluation of fever, earache and sinus pain x2d. Per patient, she has suffered from allergy symptoms x2wks with sinus congestion, sneezing and requiring saline drops and irrigation system without improvement. Two days prior to presentation, patient noted worsening R ear pain and fevers at home so visited urgent care center and found to have fever to 103F for which she was sent to the ED. At [facility], patient reported to have R TM bulging with purulent collection and labs significant for L shift, lactate wnl. CT maxillofacial showed acute pansinusitis and right mastoiditis, as well as intracranial air that has extended through a presumed tegmen defect. Patient given Clindamycin, Levaquin and Vancomycin and transferred to [facility] ED for continued management. On arrival to [facility], patient admitted to ENT service without plans for surgical intervention but with concern for meningitis so need for monitoring. ID consulted for antibiotic management.     Patient seen and evaluated at bedside. Reports some pain at L neck, but otherwise no new complaints. Denies SOB, abdominal pain, nausea, diarrhea, dysuria.     ROS: as above     ALLERGIES: penicillin (serum sickness), sulfa drugs (Unknown)    PAST MEDICAL & SURGICAL HISTORY:  Abdominal hernia  Hypothyroid  No significant past surgical history    MEDICATIONS  (STANDING):  metroNIDAZOLE  IVPB 500 milliGRAM(s) IV Intermittent every 8 hours  levothyroxine 150 MICROGram(s) Oral daily  oxymetazoline 0.05% Nasal Spray      oxymetazoline 0.05% Nasal Spray 2 Spray(s) Both Nostrils two times a day  cefepime  IVPB 2000 milliGRAM(s) IV Intermittent every 8 hours  vancomycin  IVPB 1250 milliGRAM(s) IV Intermittent every 12 hours    MEDICATIONS  (PRN):  acetaminophen   Tablet 325 milliGRAM(s) Oral every 4 hours PRN mild pain or fever >101  oxyCODONE    5 mG/acetaminophen 325 mG 1 Tablet(s) Oral every 4 hours PRN Moderate Pain  ondansetron Injectable 4 milliGRAM(s) IV Push every 6 hours PRN Nausea    VITAL SIGNS:    T(C): 38.3 (2017 13:55), Max: 39.4 (2017 10:00)  T(F): 101 (2017 13:55), Max: 103 (2017 10:00)  HR: 102 (2017 12:27) (102 - 128)  BP: 125/60 (2017 12:27) (118/60 - 144/61)  BP(mean): 83 (2017 12:27) (83 - 98)  RR: 18 (2017 12:27) (17 - 18)  SpO2: 95% (2017 12:27) (93% - 95%)    PHYSICAL EXAM:  Constitutional: middle aged female, sitting in bed, NAD   HEENT: NCAT, EOMI, no conjunctival pallor, swelling along the lateral aspect of the R mandible  Pulm: clear to auscultation bilaterally, no wheezing  CV: tachycardic, no murmurs   GI: soft, obese, nontender     LABS:                        11.5   8.9   )-----------( 149      ( 2017 11:43 )             34.0     133<L>  |  99  |  10  ----------------------------<  117<H>  4.1   |  23  |  0.60    Ca    8.8      Phos  1.6     Mg     1.8         Urinalysis Basic - ( 2017 12:28 )  Color: Yellow / Appearance: Clear / S.010 / pH: x  Gluc: x / Ketone: NEGATIVE  / Bili: NEGATIVE / Urobili: 0.2 E.U./dL   Blood: x / Protein: NEGATIVE mg/dL / Nitrite: NEGATIVE   Leuk Esterase: NEGATIVE / RBC: 5-10 /HPF / WBC x   Sq Epi: x / Non Sq Epi: x / Bacteria: x    Culture - Other (17 @ 19:57)    Gram Stain:   No organisms seen  No White blood cells    Specimen Source: .Other R ear    Culture Results:   No growth to date    Culture - Blood (17 @ 15:14)    -  Candida parapsilosis: Nondet    -  Coagulase negative Staphylococcus: Nondet    -  Enterobacter cloacae complex: Nondet    -  Enterococcus species: Nondet    -  Escherichia coli: Nondet    -  Haemophilus influenzae: Nondet    -  Neisseria meningitidis: Nondet    -  Proteus species: Nondet    -  Pseudomonas aeruginosa: Nondet    -  Streptococcus agalactiae (Group B): Nondet    -  Candida glabrata: Nondet    -  Candida tropicalis: Nondet    -  Klebsiella oxytoca: Nondet    -  Klebsiella pneumoniae: Nondet    -  Listeria monocytogenes: Nondet    -  Multidrug (KPC pos) resistant organism: Nondet    -  Staphylococcus aureus: Nondet    -  Streptococcus pyogenes (Group A): Nondet    -  Streptococcus sp. (Not Grp A, B or S pneumoniae): Nondet    -  Acinetobacter baumanii: Nondet    -  Candida albicans: Nondet    -  Candida krusei: Nondet    -  Methicillin resistant Staphylococcus aureus (MRSA): Nondet    -  Serratia marcescens: Nondet    -  Streptococcus pneumoniae: Detec    -  Vancomycin resistant Enterococcus sp.: Nondet    Gram Stain:   Growth in aerobic bottle:  Gram Positive Cocci in Pairs and Chains  Growth in anaerobic bottle: Gram Positive Cocci in Pairs and Chains    Specimen Source: .Blood Blood-Peripheral    Organism: Blood Culture PCR    Culture Results:   Growth in aerobic bottle:  Gram Positive Cocci in Pairs and Chains  ***Blood Panel PCR results on this specimen are available  approximately 3 hours after the Gram stain result.***  Gram stain, PCR, and/or culture results may not always  corresponddue to difference in methodologies.  Growth in anaerobic bottle: Gram Positive Cocci in Pairs and Chains    Organism Identification: Blood Culture PCR    Method Type: PCR    Culture - Blood (17 @ 15:14)    Gram Stain:   Growth in aerobic and anaerobic bottles:  Gram Positive Cocci in Pairs and Chains    Specimen Source: .Blood Blood-Peripheral    Culture Results:   Growth in aerobic and anaerobic bottles:  Gram Positive Cocci in Pairs and Chains    CT maxillofacial (): There is opacification of right-sided mastoid air cells and portions of the right middle ear cavity. There is intracranial air superior to the tegmen, along the right tentorium, and along the right frontal lobe.   There is no obvious fluid collection or pathologic contrast enhancement within the right temporal lobe or cerebellum. Tegmen defect is presumed, though etiology is uncertain. In this patient with severe acute sinusitis, a possible mechanism for intracranial air extension through a tegmen defect would be excessive nose blowing with transmission of pressure through the eustachian tube.  Evaluation of the paranasal sinuses demonstrates pansinusitis with fluid levels noted in the bilateral maxillary sinuses and the right frontal sinus. The sinus walls are intact, and there is no evidence of extra sinus extension of inflammatory disease. There is complete obstruction of the anterior and posterior drainage pathways bilaterally. There is mild leftward bowing of the anterior nasal septum. There is extensive mucosal thickening in the bilateral middle meatus. There is no nasopharyngeal mass.  The parotid and submandibular glands are larger than average with a coarse stromal pattern that may reflect underlying chronic sialoadenitis such as Sjogren's disease.

## 2017-07-21 NOTE — H&P ADULT - HISTORY OF PRESENT ILLNESS
HPI: 66y Female  Transfer from [facility] after ENT was consulted for R otalgia and temporal and frontal fossa pneumocephalus on CT max-fac.   Reports that she had been suffering from seasonal allergies for 2 weeks and using a neti-pot and saline spray to rinse her sinus. However, 2 days ago, she suddenly developed moderate/severe otalgia and headache on the right. Endorses excessive blowing of nose during this period. Denies trauma. Pt had been seen at urgent care, but no instrumentation of her ear or sinuses.     Appreciated neurosurgical and ID input. Started on IV abx as per ID. Not for surgical intervention at this point. Monitoring for signs of meningism.     Allergies    penicillin (serum sickness)  sulfa drugs (Unknown)    PAST MEDICAL & SURGICAL HISTORY:  Abdominal hernia  Hypothyroid  No significant past surgical history    Tobacco History:denies    MEDICATIONS:  Anti-infectives: received vanc, clinda, levaquin at [facility]    Vital Signs Last 24 Hrs  T(C): 36.7 (21 Jul 2017 05:41), Max: 39.5 (20 Jul 2017 10:57)  T(F): 98.1 (21 Jul 2017 05:41), Max: 103.1 (20 Jul 2017 10:57)  HR: 118 (21 Jul 2017 06:11) (104 - 130)  BP: 136/74 (21 Jul 2017 06:11) (118/60 - 145/78)  BP(mean): 98 (21 Jul 2017 06:11) (88 - 98)  RR: 17 (21 Jul 2017 06:11) (17 - 18)  SpO2: 93% (21 Jul 2017 06:11) (93% - 99%)    PHYSICAL EXAM:    ENT EXAM-   NAD, conversant, respond appropriately during interview, comfortable at rest  Neuro/Psych:  A&O x 3.  Mood stable.  Affect bright, but tired  Cranial nerves: 2-12 grossly intact bilaterally.  Eyes:  EOMI, no nystagmus.  Skin:  No rash or lesions on exposed skin of head/neck    CT head d/w Dr Stoddard  Thin skullbase over bilateral mastoid and sphenoid,   Extradural and intradural air in left temporal and frontal region    Assessment/Plan:    65yo w pneumocephalus likely early OM w small effusion.   - repeat labs  - level of care pending Neurosurgical consult  - discussed with ID attending on call Dr Shavatsky >            cefepime 2g IV q8           Flagyl 500mg q6           Vanco 1g q12  - f/u cultures  - No for surgical intervention at this point    See with Fellow and Chief  [facility] at [phone] with any questions/concerns. HPI:   66F transfer from [facility] after ENT was consulted for R otalgia and temporal and frontal fossa pneumocephalus on CT max-fac.   Reports that she had been suffering from seasonal allergies for 2 weeks and using a neti-pot and saline spray to rinse her sinus. However, 2 days ago, she suddenly developed moderate/severe otalgia and headache on the right. Endorses excessive blowing of nose during this period. Denies trauma. Pt had been seen at urgent care, but no instrumentation of her ear or sinuses.     Appreciated neurosurgical and ID input. Started on IV abx as per ID. Not for surgical intervention at this point. Monitoring for signs of meningism.     Allergies    penicillin (serum sickness)  sulfa drugs (Unknown)    PAST MEDICAL & SURGICAL HISTORY:  Abdominal hernia  Hypothyroid  No significant past surgical history    Tobacco History:denies    MEDICATIONS:  Anti-infectives: received vanc, clinda, levaquin at [facility]    Vital Signs Last 24 Hrs  T(C): 36.7 (21 Jul 2017 05:41), Max: 39.5 (20 Jul 2017 10:57)  T(F): 98.1 (21 Jul 2017 05:41), Max: 103.1 (20 Jul 2017 10:57)  HR: 118 (21 Jul 2017 06:11) (104 - 130)  BP: 136/74 (21 Jul 2017 06:11) (118/60 - 145/78)  BP(mean): 98 (21 Jul 2017 06:11) (88 - 98)  RR: 17 (21 Jul 2017 06:11) (17 - 18)  SpO2: 93% (21 Jul 2017 06:11) (93% - 99%)    PHYSICAL EXAM:    ENT EXAM-   NAD, conversant, respond appropriately during interview, comfortable at rest  Neuro/Psych:  A&O x 3.  Mood stable.  Affect bright, but tired  Cranial nerves: 2-12 grossly intact bilaterally.  Eyes:  EOMI, no nystagmus.  Skin:  No rash or lesions on exposed skin of head/neck    NASAL ENDOSCOPY  -Verbal consent obtained from patient prior to exam.   Findings:   -- Inferior turbinates normal bilateral.  -- Septum was midline      -- No polyps either side  -- purulence Mucous middle meatus bilateral R>L  -- Nasopharynx without mass or exudate  -- Eustachian orifices were clear bilateral    The patient tolerated the procedure well.  -- nasal swab collected from right middle meatus    Otoscopy  AS normal  AD - TM erythematous, slight bulging  serous effusion on the inferior 1/5 of the TM    CT head d/w Dr Stoddard  Thin skullbase over bilateral mastoid and sphenoid,   Extradural and intradural air in left temporal and frontal region    CT head d/w Dr Stoddard  Thin skullbase over bilateral mastoid and sphenoid,   Extradural and intradural air in left temporal and frontal region    Assessment/Plan:    67yo w pneumocephalus likely early OM w small effusion.   - repeat labs  - appreciated continued input from Neurosurg  - discussed with ID attending on call Dr Maldonado >            cefepime 2g IV q8           Flagyl 500mg q6           Vanco 1g q12  - f/u cultures  - No for surgical intervention at this point    See with Fellow and Chief  [facility] at [phone] with any questions/concerns.

## 2017-07-22 LAB
-  AZITHROMYCIN: SIGNIFICANT CHANGE UP
-  CEFTRIAXONE: SIGNIFICANT CHANGE UP
-  CLINDAMYCIN: SIGNIFICANT CHANGE UP
-  ERYTHROMYCIN: SIGNIFICANT CHANGE UP
-  LEVOFLOXACIN: SIGNIFICANT CHANGE UP
-  PENICILLIN: SIGNIFICANT CHANGE UP
-  TRIMETHOPRIM/SULFAMETHOXAZOLE: SIGNIFICANT CHANGE UP
-  VANCOMYCIN: SIGNIFICANT CHANGE UP
ANION GAP SERPL CALC-SCNC: 13 MMOL/L — SIGNIFICANT CHANGE UP (ref 5–17)
BUN SERPL-MCNC: 9 MG/DL — SIGNIFICANT CHANGE UP (ref 7–23)
CALCIUM SERPL-MCNC: 8.4 MG/DL — SIGNIFICANT CHANGE UP (ref 8.4–10.5)
CHLORIDE SERPL-SCNC: 101 MMOL/L — SIGNIFICANT CHANGE UP (ref 96–108)
CO2 SERPL-SCNC: 22 MMOL/L — SIGNIFICANT CHANGE UP (ref 22–31)
CREAT SERPL-MCNC: 0.7 MG/DL — SIGNIFICANT CHANGE UP (ref 0.5–1.3)
CRP SERPL-MCNC: 21.8 MG/DL — HIGH (ref 0–0.4)
CULTURE RESULTS: SIGNIFICANT CHANGE UP
CULTURE RESULTS: SIGNIFICANT CHANGE UP
ERYTHROCYTE [SEDIMENTATION RATE] IN BLOOD: 75 MM/HR — HIGH
GLUCOSE SERPL-MCNC: 105 MG/DL — HIGH (ref 70–99)
HCT VFR BLD CALC: 36 % — SIGNIFICANT CHANGE UP (ref 34.5–45)
HGB BLD-MCNC: 12.1 G/DL — SIGNIFICANT CHANGE UP (ref 11.5–15.5)
MAGNESIUM SERPL-MCNC: 2 MG/DL — SIGNIFICANT CHANGE UP (ref 1.6–2.6)
MCHC RBC-ENTMCNC: 27.9 PG — SIGNIFICANT CHANGE UP (ref 27–34)
MCHC RBC-ENTMCNC: 33.6 G/DL — SIGNIFICANT CHANGE UP (ref 32–36)
MCV RBC AUTO: 82.9 FL — SIGNIFICANT CHANGE UP (ref 80–100)
METHOD TYPE: SIGNIFICANT CHANGE UP
METHOD TYPE: SIGNIFICANT CHANGE UP
ORGANISM # SPEC MICROSCOPIC CNT: SIGNIFICANT CHANGE UP
PHOSPHATE SERPL-MCNC: 2.6 MG/DL — SIGNIFICANT CHANGE UP (ref 2.5–4.5)
PLATELET # BLD AUTO: 156 K/UL — SIGNIFICANT CHANGE UP (ref 150–400)
POTASSIUM SERPL-MCNC: 3.9 MMOL/L — SIGNIFICANT CHANGE UP (ref 3.5–5.3)
POTASSIUM SERPL-SCNC: 3.9 MMOL/L — SIGNIFICANT CHANGE UP (ref 3.5–5.3)
RBC # BLD: 4.34 M/UL — SIGNIFICANT CHANGE UP (ref 3.8–5.2)
RBC # FLD: 13.4 % — SIGNIFICANT CHANGE UP (ref 10.3–16.9)
SODIUM SERPL-SCNC: 136 MMOL/L — SIGNIFICANT CHANGE UP (ref 135–145)
SPECIMEN SOURCE: SIGNIFICANT CHANGE UP
SPECIMEN SOURCE: SIGNIFICANT CHANGE UP
WBC # BLD: 10 K/UL — SIGNIFICANT CHANGE UP (ref 3.8–10.5)
WBC # FLD AUTO: 10 K/UL — SIGNIFICANT CHANGE UP (ref 3.8–10.5)

## 2017-07-22 RX ORDER — IBUPROFEN 200 MG
600 TABLET ORAL EVERY 6 HOURS
Qty: 0 | Refills: 0 | Status: DISCONTINUED | OUTPATIENT
Start: 2017-07-22 | End: 2017-07-28

## 2017-07-22 RX ADMIN — Medication 100 MILLIGRAM(S): at 17:35

## 2017-07-22 RX ADMIN — Medication 600 MILLIGRAM(S): at 15:27

## 2017-07-22 RX ADMIN — Medication 150 MICROGRAM(S): at 06:38

## 2017-07-22 RX ADMIN — Medication 600 MILLIGRAM(S): at 14:50

## 2017-07-22 RX ADMIN — OXYMETAZOLINE HYDROCHLORIDE 2 SPRAY(S): 0.5 SPRAY NASAL at 05:57

## 2017-07-22 RX ADMIN — CEFEPIME 100 MILLIGRAM(S): 1 INJECTION, POWDER, FOR SOLUTION INTRAMUSCULAR; INTRAVENOUS at 21:49

## 2017-07-22 RX ADMIN — Medication 100 MILLIGRAM(S): at 12:31

## 2017-07-22 RX ADMIN — Medication 600 MILLIGRAM(S): at 23:10

## 2017-07-22 RX ADMIN — Medication 166.67 MILLIGRAM(S): at 23:10

## 2017-07-22 RX ADMIN — Medication 166.67 MILLIGRAM(S): at 14:50

## 2017-07-22 RX ADMIN — Medication 166.67 MILLIGRAM(S): at 07:49

## 2017-07-22 RX ADMIN — Medication 204 MILLIGRAM(S): at 22:35

## 2017-07-22 RX ADMIN — Medication 204 MILLIGRAM(S): at 13:50

## 2017-07-22 RX ADMIN — CEFEPIME 100 MILLIGRAM(S): 1 INJECTION, POWDER, FOR SOLUTION INTRAMUSCULAR; INTRAVENOUS at 05:57

## 2017-07-22 RX ADMIN — Medication 100 MILLIGRAM(S): at 00:04

## 2017-07-22 RX ADMIN — OXYMETAZOLINE HYDROCHLORIDE 2 SPRAY(S): 0.5 SPRAY NASAL at 17:35

## 2017-07-22 RX ADMIN — Medication 100 MILLIGRAM(S): at 06:38

## 2017-07-22 RX ADMIN — CEFEPIME 100 MILLIGRAM(S): 1 INJECTION, POWDER, FOR SOLUTION INTRAMUSCULAR; INTRAVENOUS at 14:10

## 2017-07-22 NOTE — PROGRESS NOTE ADULT - SUBJECTIVE AND OBJECTIVE BOX
RADHA-HNS PROGRESS NOTE    HPI: 66F transfer from [facility] after ENT was consulted for R otalgia and temporal and frontal fossa pneumocephalus on CT max-fac. Reports that she had been suffering from seasonal allergies for 2 weeks and using a neti-pot and saline spray to rinse her sinus. However, 2 days ago, she suddenly developed moderate/severe otalgia and headache on the right. Endorses excessive blowing of nose during this period. Denies trauma. Pt had been seen at urgent care, but no instrumentation of her ear or sinuses.     7/21: Appreciated neurosurgical and ID input. Started on IV abx as per ID. Not for surgical intervention at this point. Monitoring for signs of meningism.  7/22:  Patient febrile to 103, mild tachycardia to 108 oVSS overnight. Pancultured. Vanc trough subtherapeutic at 10.8 so increased to 1.25 q8h. Continues on flagyl/cefepime. Nausea, no emesis, improved with phenergan. Improvement in headache. No meningeal signs. Denies photophobia.       PE:  NAD, conversant, respond appropriately during interview, comfortable at rest  NC/AT, EOMI, face grossly symmetric  no neck pain or meningismus, no nuchal ridigidity.    vipin MARTINEZ    Labs:  CBC Full  -  ( 22 Jul 2017 05:59 )  WBC Count : 10.0 K/uL  Hemoglobin : 12.1 g/dL  Hematocrit : 36.0 %  Platelet Count - Automated : 156 K/uL  Mean Cell Volume : 82.9 fL  Mean Cell Hemoglobin : 27.9 pg  Mean Cell Hemoglobin Concentration : 33.6 g/dL    BCx (7/20): +Strep pneumo   (7/21): NGTD  Nasal cx (7/21): NGTD      CT head d/w Dr Stoddard  Thin skullbase over bilateral mastoid and sphenoid,   Extradural and intradural air in left temporal and frontal region    Assessment/Plan:    65yo w pneumocephalus likely early OM w small effusion.     - daily CBC  - f/u cultures   - appreciated continued input from Neurosurg  - ID following (Dr Maldonado)           cefepime 2g IV q8           Flagyl 500mg q6           Vanco 1.25g q8           repeat cx q48hrs if febrile   - repeat vanc trough 30 min prior to 4th dose  -prn pain control, tylenol for fever   -phenergan/zofran prn nausea  -Regular diet   -Plan for MRI possibly tomorow   - No for surgical intervention at this point  -[facility] at [phone] with any questions/concerns.    Seen with Fellow and Chief. Discussed with attending who agrees with plan above.

## 2017-07-23 LAB
HCT VFR BLD CALC: 34.5 % — SIGNIFICANT CHANGE UP (ref 34.5–45)
HGB BLD-MCNC: 11.7 G/DL — SIGNIFICANT CHANGE UP (ref 11.5–15.5)
MCHC RBC-ENTMCNC: 27.9 PG — SIGNIFICANT CHANGE UP (ref 27–34)
MCHC RBC-ENTMCNC: 33.9 G/DL — SIGNIFICANT CHANGE UP (ref 32–36)
MCV RBC AUTO: 82.3 FL — SIGNIFICANT CHANGE UP (ref 80–100)
PLATELET # BLD AUTO: 171 K/UL — SIGNIFICANT CHANGE UP (ref 150–400)
RBC # BLD: 4.19 M/UL — SIGNIFICANT CHANGE UP (ref 3.8–5.2)
RBC # FLD: 13.4 % — SIGNIFICANT CHANGE UP (ref 10.3–16.9)
VANCOMYCIN TROUGH SERPL-MCNC: 17.7 UG/ML — SIGNIFICANT CHANGE UP (ref 10–20)
WBC # BLD: 7.3 K/UL — SIGNIFICANT CHANGE UP (ref 3.8–10.5)
WBC # FLD AUTO: 7.3 K/UL — SIGNIFICANT CHANGE UP (ref 3.8–10.5)

## 2017-07-23 RX ORDER — LACTOBACILLUS ACIDOPH-L.BULGARICUS 1 MILLION CELL CHEWABLE TABLET 1MM CELL
1 TABLET,CHEWABLE ORAL DAILY
Qty: 0 | Refills: 0 | Status: DISCONTINUED | OUTPATIENT
Start: 2017-07-23 | End: 2017-07-28

## 2017-07-23 RX ORDER — LACTOBACILLUS ACIDOPH-L.BULGARICUS 1 MILLION CELL CHEWABLE TABLET 1MM CELL
1 TABLET,CHEWABLE ORAL ONCE
Qty: 0 | Refills: 0 | Status: COMPLETED | OUTPATIENT
Start: 2017-07-23 | End: 2017-07-23

## 2017-07-23 RX ORDER — HEPARIN SODIUM 5000 [USP'U]/ML
5000 INJECTION INTRAVENOUS; SUBCUTANEOUS EVERY 8 HOURS
Qty: 0 | Refills: 0 | Status: DISCONTINUED | OUTPATIENT
Start: 2017-07-23 | End: 2017-07-28

## 2017-07-23 RX ORDER — BACITRACIN ZINC 500 UNIT/G
1 OINTMENT IN PACKET (EA) TOPICAL
Qty: 0 | Refills: 0 | Status: DISCONTINUED | OUTPATIENT
Start: 2017-07-23 | End: 2017-07-25

## 2017-07-23 RX ADMIN — Medication 204 MILLIGRAM(S): at 13:04

## 2017-07-23 RX ADMIN — CEFEPIME 100 MILLIGRAM(S): 1 INJECTION, POWDER, FOR SOLUTION INTRAMUSCULAR; INTRAVENOUS at 05:35

## 2017-07-23 RX ADMIN — Medication 600 MILLIGRAM(S): at 00:10

## 2017-07-23 RX ADMIN — HEPARIN SODIUM 5000 UNIT(S): 5000 INJECTION INTRAVENOUS; SUBCUTANEOUS at 13:22

## 2017-07-23 RX ADMIN — OXYMETAZOLINE HYDROCHLORIDE 2 SPRAY(S): 0.5 SPRAY NASAL at 17:26

## 2017-07-23 RX ADMIN — OXYMETAZOLINE HYDROCHLORIDE 2 SPRAY(S): 0.5 SPRAY NASAL at 05:35

## 2017-07-23 RX ADMIN — Medication 166.67 MILLIGRAM(S): at 09:50

## 2017-07-23 RX ADMIN — ONDANSETRON 4 MILLIGRAM(S): 8 TABLET, FILM COATED ORAL at 23:17

## 2017-07-23 RX ADMIN — CEFEPIME 100 MILLIGRAM(S): 1 INJECTION, POWDER, FOR SOLUTION INTRAMUSCULAR; INTRAVENOUS at 14:52

## 2017-07-23 RX ADMIN — Medication 166.67 MILLIGRAM(S): at 17:24

## 2017-07-23 RX ADMIN — Medication 600 MILLIGRAM(S): at 11:11

## 2017-07-23 RX ADMIN — HEPARIN SODIUM 5000 UNIT(S): 5000 INJECTION INTRAVENOUS; SUBCUTANEOUS at 23:16

## 2017-07-23 RX ADMIN — Medication 100 MILLIGRAM(S): at 06:44

## 2017-07-23 RX ADMIN — Medication 600 MILLIGRAM(S): at 21:53

## 2017-07-23 RX ADMIN — Medication 204 MILLIGRAM(S): at 05:38

## 2017-07-23 RX ADMIN — Medication 1 APPLICATION(S): at 23:16

## 2017-07-23 RX ADMIN — LACTOBACILLUS ACIDOPH-L.BULGARICUS 1 MILLION CELL CHEWABLE TABLET 1 TABLET(S): at 11:11

## 2017-07-23 RX ADMIN — CEFEPIME 100 MILLIGRAM(S): 1 INJECTION, POWDER, FOR SOLUTION INTRAMUSCULAR; INTRAVENOUS at 23:16

## 2017-07-23 RX ADMIN — Medication 100 MILLIGRAM(S): at 00:52

## 2017-07-23 RX ADMIN — Medication 204 MILLIGRAM(S): at 19:00

## 2017-07-23 RX ADMIN — Medication 600 MILLIGRAM(S): at 20:53

## 2017-07-23 RX ADMIN — Medication 150 MICROGRAM(S): at 05:35

## 2017-07-23 RX ADMIN — SODIUM CHLORIDE 75 MILLILITER(S): 9 INJECTION, SOLUTION INTRAVENOUS at 02:32

## 2017-07-23 RX ADMIN — Medication 100 MILLIGRAM(S): at 19:46

## 2017-07-23 RX ADMIN — Medication 600 MILLIGRAM(S): at 11:48

## 2017-07-23 RX ADMIN — Medication 100 MILLIGRAM(S): at 13:22

## 2017-07-23 NOTE — PROGRESS NOTE ADULT - ASSESSMENT
Pneumocephalus and inadvertent bacterial invasion into CNS from invasive sinusitis, most likely  Pneumococcal bacteremia  right otitis  Sepsis due to the above  Sinus culture unrevealing    RECOMMEND  Continue IV abx  Agree with the plans for MRI  Could likely change Cefepime to Ceftriaxone 2 gm IV q 12 hours  Continue IV Vanco and Metronidazole

## 2017-07-23 NOTE — PROGRESS NOTE ADULT - SUBJECTIVE AND OBJECTIVE BOX
RADHA-HNS PROGRESS NOTE    HPI: 66F transfer from [facility] after ENT was consulted for R otalgia and temporal and frontal fossa pneumocephalus on CT max-fac. Reports that she had been suffering from seasonal allergies for 2 weeks and using a neti-pot and saline spray to rinse her sinus. However, 2 days ago, she suddenly developed moderate/severe otalgia and headache on the right. Endorses excessive blowing of nose during this period. Denies trauma. Pt had been seen at urgent care, but no instrumentation of her ear or sinuses.     7/21: Appreciated neurosurgical and ID input. Started on IV abx as per ID. Not for surgical intervention at this point. Monitoring for signs of meningism.  7/22:  Patient febrile to 103, mild tachycardia to 108 oVSS overnight. Pancultured. Vanc trough subtherapeutic at 10.8 so increased to 1.25 q8h. Continues on flagyl/cefepime. Nausea, no emesis, improved with phenergan. Improvement in headache. No meningeal signs. Denies photophobia.   7/23: ALANIS overnight. Febrile to 101x1 at 2PM but otherwise AF. tachycardia improved. HR 86-94. Mild headache improved with motrin. Feeling better. Nausea improved. VT 17.7.    PE:  NAD, conversant, respond appropriately, following commands, comfortable at rest  NC/AT, EOMI, face grossly symmetric  no neck pain or meningismus, no nuchal ridigidity.    small cut to right nasal ala with surrounding erythema   vipin MARTINEZ    Labs:                        11.7   7.3   )-----------( 171      ( 23 Jul 2017 07:12 )             34.5       BCx (7/20): +Strep pneumo, pansensitive   (7/21): NGTD  Nasal cx (7/21): NGTD    CT head d/w Dr Stoddard  Thin skullbase over bilateral mastoid and sphenoid,   Extradural and intradural air in left temporal and frontal region    Assessment/Plan:    67yo w pneumocephalus likely early OM w small effusion and Strep pneumo bacteremia at OSH. Repeat cultures negative so far.     - MRI w/wout brain/skull base/sinuses today   - daily CBC  - f/u cultures   - appreciated continued input from Neurosurg  - ID following (Dr Shavatsky)           cefepime 2g IV q8           Flagyl 500mg q6           Vanco 1.25g q8           repeat cx q48hrs if febrile   -prn pain control, tylenol for fever   -phenergan/zofran prn nausea  -Regular diet  - baci to R naris  - bowel regimen   -DVT ppx: SCDs, SQH, ambulate  -[facility] at [phone] with any questions/concerns.    Dispo: SDU    Seen with Fellow and Chief. Discussed with attending who agrees with plan above.

## 2017-07-23 NOTE — PROGRESS NOTE ADULT - SUBJECTIVE AND OBJECTIVE BOX
INTERVAL HPI/OVERNIGHT EVENTS:  Reviewed   Better but still with significant sinus pain and right ear clogged    MEDICATIONS  (STANDING):  levothyroxine 150 MICROGram(s) Oral daily  oxymetazoline 0.05% Nasal Spray      oxymetazoline 0.05% Nasal Spray 2 Spray(s) Both Nostrils two times a day  cefepime  IVPB 2000 milliGRAM(s) IV Intermittent every 8 hours  metroNIDAZOLE  IVPB 500 milliGRAM(s) IV Intermittent every 6 hours  vancomycin  IVPB 1250 milliGRAM(s) IV Intermittent every 8 hours  heparin  Injectable 5000 Unit(s) SubCutaneous every 8 hours  lactobacillus acidophilus and bulgaricus Chewable 1 Tablet(s) Chew daily  BACItracin   Ointment 1 Application(s) Topical two times a day    MEDICATIONS  (PRN):  acetaminophen   Tablet 325 milliGRAM(s) Oral every 4 hours PRN mild pain or fever >101  oxyCODONE    5 mG/acetaminophen 325 mG 1 Tablet(s) Oral every 4 hours PRN Moderate Pain  ondansetron Injectable 4 milliGRAM(s) IV Push every 6 hours PRN Nausea  promethazine IVPB 25 milliGRAM(s) IV Intermittent every 6 hours PRN nausea  ibuprofen  Tablet 600 milliGRAM(s) Oral every 6 hours PRN headache      Allergies    penicillin (Unknown)  sulfa drugs (Unknown)      EXAM  Vital Signs Last 24 Hrs  T(C): 37.4 (23 Jul 2017 09:28), Max: 37.6 (22 Jul 2017 21:54)  T(F): 99.3 (23 Jul 2017 09:28), Max: 99.6 (22 Jul 2017 21:54)  HR: 96 (23 Jul 2017 09:02) (84 - 96)  BP: 135/70 (23 Jul 2017 09:02) (115/63 - 135/79)  BP(mean): 96 (23 Jul 2017 09:02) (82 - 103)  RR: 16 (23 Jul 2017 09:02) (16 - 19)  SpO2: 95% (23 Jul 2017 09:02) (94% - 95%)  Awake and alert  No rash  Neck supple EOMI  No tenderness over mastoids  RRR  LE with edema    LABS:                        11.7   7.3   )-----------( 171      ( 23 Jul 2017 07:12 )             34.5     07-22    136  |  101  |  9   ----------------------------<  105<H>  3.9   |  22  |  0.70    Ca    8.4      22 Jul 2017 05:58  Phos  2.6     07-22  Mg     2.0     07-22        Vancomycin Level, Trough (07.23.17 @ 07:12)    Vancomycin Level, Trough: 17.7      MICROBIOLOGY:    Culture - Blood (07.21.17 @ 13:50)    Specimen Source: .Blood None    Culture Results:   No growth at 2 days.    Culture - Blood (07.21.17 @ 13:50)    Specimen Source: .Blood None    Culture Results:   No growth at 2 days.    Culture - Other (07.20.17 @ 19:57)    Gram Stain:   No organisms seen  No White blood cells    Specimen Source: .Other R ear    Culture Results:   Growth in fluid media only Coag Negative Staphylococcus  Susceptibility to follow.    Culture - Blood (07.20.17 @ 15:14)    -  Azithromycin: S    -  Candida parapsilosis: Nondet    -  Clindamycin: S    -  Coagulase negative Staphylococcus: Nondet    -  Enterobacter cloacae complex: Nondet    -  Enterococcus species: Nondet    -  Escherichia coli: Nondet    -  Haemophilus influenzae: Nondet    -  Neisseria meningitidis: Nondet    -  Penicillin: See note 0.016    -  Proteus species: Nondet    -  Pseudomonas aeruginosa: Nondet    -  Streptococcus agalactiae (Group B): Nondet    -  Trimethoprim/Sulfamethoxazole: S    -  Vancomycin: S    -  Candida glabrata: Nondet    -  Candida tropicalis: Nondet    -  Klebsiella oxytoca: Nondet    -  Klebsiella pneumoniae: Nondet    -  Listeria monocytogenes: Nondet    -  Multidrug (KPC pos) resistant organism: Nondet    -  Staphylococcus aureus: Nondet    -  Streptococcus pyogenes (Group A): Nondet    -  Streptococcus sp. (Not Grp A, B or S pneumoniae): Nondet    -  Levofloxacin: S    -  Acinetobacter baumanii: Nondet    -  Candida albicans: Nondet    -  Candida krusei: Nondet    -  Ceftriaxone: See note 0.023    -  Erythromycin: S    -  Methicillin resistant Staphylococcus aureus (MRSA): Nondet    -  Serratia marcescens: Nondet    -  Streptococcus pneumoniae: Detec    -  Vancomycin resistant Enterococcus sp.: Nondet    Gram Stain:   Growth in aerobic bottle:  Gram Positive Cocci in Pairs and Chains  Growth in anaerobic bottle: Gram Positive Cocci in Pairs and Chains    Specimen Source: .Blood Blood-Peripheral    Organism: Streptococcus pneumoniae    Organism: Streptococcus pneumoniae    Organism: Blood Culture PCR    Culture Results:   Growth in aerobic and anaerobic bottles: Streptococcus pneumoniae  Therapy requires maximum dose of Ceftriaxone and/or  Penicillin. Interpretive criteria as follows:  Ceftriaxone breakpoints for meningitis infections:  <=0.5=Sensitive, 1.0=Intermediate, >=2.0=Resistant  Penicillin breakpoints for meningitis infections:  <=0.06=Sensitive, >= 0.12=Resistant  Ceftriaxone breakpoints for non-meningitis infections:  <=1.0=Sensitive, 2.0=Intermediate, >=4.0=Resistant  Penicillin breakpoints for non-meningitis infections:  <=2.0=Sensitive, 4.0=Intermediate, >=8.0=Resistant  Oral Penicillin breakpoints:  <=0.06=Sensitive, 0.12-1.0=Intermediate, >=2.0=Resistant  Please note: In case of suspected meningitis, CSF  interpretive criteria must be used independent of specimen source.  ***Blood Panel PCR results on this specimen are available  approximately 3 hours after the Gram stain result.***  Gram stain, PCR, and/or culture results may not always  correspond due to difference in methodologies.    Organism Identification: Blood Culture PCR  Streptococcus pneumoniae  Streptococcus pneumoniae    Method Type: ETEST    Method Type: KB    Method Type: PCR          RADIOLOGY & ADDITIONAL STUDIES:    MRI pending

## 2017-07-24 LAB
-  CEFAZOLIN: SIGNIFICANT CHANGE UP
-  CLINDAMYCIN: SIGNIFICANT CHANGE UP
-  ERYTHROMYCIN: SIGNIFICANT CHANGE UP
-  LINEZOLID: SIGNIFICANT CHANGE UP
-  OXACILLIN: SIGNIFICANT CHANGE UP
-  PENICILLIN: SIGNIFICANT CHANGE UP
-  RIFAMPIN: SIGNIFICANT CHANGE UP
-  TRIMETHOPRIM/SULFAMETHOXAZOLE: SIGNIFICANT CHANGE UP
-  VANCOMYCIN: SIGNIFICANT CHANGE UP
APPEARANCE UR: CLEAR — SIGNIFICANT CHANGE UP
BILIRUB UR-MCNC: NEGATIVE — SIGNIFICANT CHANGE UP
COLOR SPEC: YELLOW — SIGNIFICANT CHANGE UP
CRP SERPL-MCNC: 6.9 MG/DL — HIGH (ref 0–0.4)
CULTURE RESULTS: SIGNIFICANT CHANGE UP
DIFF PNL FLD: NEGATIVE — SIGNIFICANT CHANGE UP
ERYTHROCYTE [SEDIMENTATION RATE] IN BLOOD: 90 MM/HR — HIGH
GLUCOSE UR QL: NEGATIVE — SIGNIFICANT CHANGE UP
HCT VFR BLD CALC: 35.7 % — SIGNIFICANT CHANGE UP (ref 34.5–45)
HCT VFR BLD CALC: 37.3 % — SIGNIFICANT CHANGE UP (ref 34.5–45)
HGB BLD-MCNC: 11.8 G/DL — SIGNIFICANT CHANGE UP (ref 11.5–15.5)
HGB BLD-MCNC: 12 G/DL — SIGNIFICANT CHANGE UP (ref 11.5–15.5)
KETONES UR-MCNC: 15 MG/DL
LEUKOCYTE ESTERASE UR-ACNC: NEGATIVE — SIGNIFICANT CHANGE UP
MCHC RBC-ENTMCNC: 26.7 PG — LOW (ref 27–34)
MCHC RBC-ENTMCNC: 27.4 PG — SIGNIFICANT CHANGE UP (ref 27–34)
MCHC RBC-ENTMCNC: 32.2 G/DL — SIGNIFICANT CHANGE UP (ref 32–36)
MCHC RBC-ENTMCNC: 33.1 G/DL — SIGNIFICANT CHANGE UP (ref 32–36)
MCV RBC AUTO: 82.9 FL — SIGNIFICANT CHANGE UP (ref 80–100)
MCV RBC AUTO: 83 FL — SIGNIFICANT CHANGE UP (ref 80–100)
METHOD TYPE: SIGNIFICANT CHANGE UP
NITRITE UR-MCNC: NEGATIVE — SIGNIFICANT CHANGE UP
ORGANISM # SPEC MICROSCOPIC CNT: SIGNIFICANT CHANGE UP
ORGANISM # SPEC MICROSCOPIC CNT: SIGNIFICANT CHANGE UP
PH UR: 5.5 — SIGNIFICANT CHANGE UP (ref 5–8)
PLATELET # BLD AUTO: 207 K/UL — SIGNIFICANT CHANGE UP (ref 150–400)
PLATELET # BLD AUTO: 252 K/UL — SIGNIFICANT CHANGE UP (ref 150–400)
PROT UR-MCNC: NEGATIVE MG/DL — SIGNIFICANT CHANGE UP
RBC # BLD: 4.3 M/UL — SIGNIFICANT CHANGE UP (ref 3.8–5.2)
RBC # BLD: 4.5 M/UL — SIGNIFICANT CHANGE UP (ref 3.8–5.2)
RBC # FLD: 12.9 % — SIGNIFICANT CHANGE UP (ref 10.3–16.9)
RBC # FLD: 13 % — SIGNIFICANT CHANGE UP (ref 10.3–16.9)
SP GR SPEC: 1.01 — SIGNIFICANT CHANGE UP (ref 1–1.03)
SPECIMEN SOURCE: SIGNIFICANT CHANGE UP
UROBILINOGEN FLD QL: 0.2 E.U./DL — SIGNIFICANT CHANGE UP
WBC # BLD: 7.4 K/UL — SIGNIFICANT CHANGE UP (ref 3.8–10.5)
WBC # BLD: 8.3 K/UL — SIGNIFICANT CHANGE UP (ref 3.8–10.5)
WBC # FLD AUTO: 7.4 K/UL — SIGNIFICANT CHANGE UP (ref 3.8–10.5)
WBC # FLD AUTO: 8.3 K/UL — SIGNIFICANT CHANGE UP (ref 3.8–10.5)

## 2017-07-24 PROCEDURE — 70553 MRI BRAIN STEM W/O & W/DYE: CPT | Mod: 26

## 2017-07-24 RX ORDER — SODIUM CHLORIDE 9 MG/ML
1000 INJECTION, SOLUTION INTRAVENOUS
Qty: 0 | Refills: 0 | Status: DISCONTINUED | OUTPATIENT
Start: 2017-07-24 | End: 2017-07-26

## 2017-07-24 RX ORDER — PANTOPRAZOLE SODIUM 20 MG/1
20 TABLET, DELAYED RELEASE ORAL ONCE
Qty: 0 | Refills: 0 | Status: COMPLETED | OUTPATIENT
Start: 2017-07-24 | End: 2017-07-24

## 2017-07-24 RX ORDER — SODIUM CHLORIDE 0.65 %
1 AEROSOL, SPRAY (ML) NASAL THREE TIMES A DAY
Qty: 0 | Refills: 0 | Status: DISCONTINUED | OUTPATIENT
Start: 2017-07-24 | End: 2017-07-24

## 2017-07-24 RX ORDER — CEFTRIAXONE 500 MG/1
2 INJECTION, POWDER, FOR SOLUTION INTRAMUSCULAR; INTRAVENOUS EVERY 12 HOURS
Qty: 0 | Refills: 0 | Status: DISCONTINUED | OUTPATIENT
Start: 2017-07-24 | End: 2017-07-28

## 2017-07-24 RX ORDER — SODIUM CHLORIDE 0.65 %
1 AEROSOL, SPRAY (ML) NASAL
Qty: 0 | Refills: 0 | Status: DISCONTINUED | OUTPATIENT
Start: 2017-07-24 | End: 2017-07-28

## 2017-07-24 RX ORDER — VANCOMYCIN HCL 1 G
1250 VIAL (EA) INTRAVENOUS EVERY 8 HOURS
Qty: 0 | Refills: 0 | Status: DISCONTINUED | OUTPATIENT
Start: 2017-07-24 | End: 2017-07-27

## 2017-07-24 RX ADMIN — HEPARIN SODIUM 5000 UNIT(S): 5000 INJECTION INTRAVENOUS; SUBCUTANEOUS at 14:14

## 2017-07-24 RX ADMIN — Medication 100 MILLIGRAM(S): at 21:40

## 2017-07-24 RX ADMIN — LACTOBACILLUS ACIDOPH-L.BULGARICUS 1 MILLION CELL CHEWABLE TABLET 1 TABLET(S): at 12:10

## 2017-07-24 RX ADMIN — Medication 600 MILLIGRAM(S): at 03:06

## 2017-07-24 RX ADMIN — Medication 166.67 MILLIGRAM(S): at 09:45

## 2017-07-24 RX ADMIN — Medication 204 MILLIGRAM(S): at 14:14

## 2017-07-24 RX ADMIN — HEPARIN SODIUM 5000 UNIT(S): 5000 INJECTION INTRAVENOUS; SUBCUTANEOUS at 07:08

## 2017-07-24 RX ADMIN — Medication 2 MILLIGRAM(S): at 17:54

## 2017-07-24 RX ADMIN — Medication 600 MILLIGRAM(S): at 16:14

## 2017-07-24 RX ADMIN — CEFEPIME 100 MILLIGRAM(S): 1 INJECTION, POWDER, FOR SOLUTION INTRAMUSCULAR; INTRAVENOUS at 07:31

## 2017-07-24 RX ADMIN — ONDANSETRON 4 MILLIGRAM(S): 8 TABLET, FILM COATED ORAL at 16:55

## 2017-07-24 RX ADMIN — HEPARIN SODIUM 5000 UNIT(S): 5000 INJECTION INTRAVENOUS; SUBCUTANEOUS at 21:34

## 2017-07-24 RX ADMIN — Medication 166.67 MILLIGRAM(S): at 01:37

## 2017-07-24 RX ADMIN — Medication 204 MILLIGRAM(S): at 01:04

## 2017-07-24 RX ADMIN — Medication 325 MILLIGRAM(S): at 00:56

## 2017-07-24 RX ADMIN — Medication 1 APPLICATION(S): at 21:41

## 2017-07-24 RX ADMIN — Medication 204 MILLIGRAM(S): at 07:08

## 2017-07-24 RX ADMIN — Medication 600 MILLIGRAM(S): at 15:44

## 2017-07-24 RX ADMIN — CEFTRIAXONE 100 GRAM(S): 500 INJECTION, POWDER, FOR SOLUTION INTRAMUSCULAR; INTRAVENOUS at 21:34

## 2017-07-24 RX ADMIN — OXYMETAZOLINE HYDROCHLORIDE 2 SPRAY(S): 0.5 SPRAY NASAL at 07:12

## 2017-07-24 RX ADMIN — Medication 100 MILLIGRAM(S): at 14:54

## 2017-07-24 RX ADMIN — PANTOPRAZOLE SODIUM 20 MILLIGRAM(S): 20 TABLET, DELAYED RELEASE ORAL at 10:09

## 2017-07-24 RX ADMIN — Medication 100 MILLIGRAM(S): at 00:02

## 2017-07-24 RX ADMIN — Medication 100 MILLIGRAM(S): at 08:31

## 2017-07-24 RX ADMIN — Medication 125 MILLIGRAM(S): at 22:40

## 2017-07-24 RX ADMIN — Medication 150 MICROGRAM(S): at 07:31

## 2017-07-24 RX ADMIN — Medication 600 MILLIGRAM(S): at 02:06

## 2017-07-24 RX ADMIN — Medication 1 APPLICATION(S): at 07:12

## 2017-07-24 NOTE — PROGRESS NOTE ADULT - SUBJECTIVE AND OBJECTIVE BOX
RADHA-HNS PROGRESS NOTE    HPI: 66F transfer from [facility] after ENT was consulted for R otalgia and temporal and frontal fossa pneumocephalus on CT max-fac. Reports that she had been suffering from seasonal allergies for 2 weeks and using a neti-pot and saline spray to rinse her sinus. However, 2 days ago, she suddenly developed moderate/severe otalgia and headache on the right. Endorses excessive blowing of nose during this period. Denies trauma. Pt had been seen at urgent care, but no instrumentation of her ear or sinuses.     7/21: Appreciated neurosurgical and ID input. Started on IV abx as per ID. Not for surgical intervention at this point. Monitoring for signs of meningism.  7/22:  Patient febrile to 103, mild tachycardia to 108 oVSS overnight. Pancultured. Vanc trough subtherapeutic at 10.8 so increased to 1.25 q8h. Continues on flagyl/cefepime. Nausea, no emesis, improved with phenergan. Improvement in headache. No meningeal signs. Denies photophobia.   7/23: ALANIS overnight. Febrile to 101x1 at 2PM but otherwise AF. tachycardia improved. HR 86-94. Mild headache improved with motrin. Feeling better. Nausea improved. VT 17.7.  7/24: ALANIS overnight. AFVSS. Attempted MRI however patient refused 2/2 nausea and anxiety. Still with nausea associated with vanc tx slightly improved with prn anti-nausea. No meningeal signs.     Vital Signs Last 24 Hrs  T(C): 36.5 (24 Jul 2017 05:23), Max: 36.5 (24 Jul 2017 05:23)  T(F): 97.7 (24 Jul 2017 05:23), Max: 97.7 (24 Jul 2017 05:23)  HR: 88 (24 Jul 2017 08:20) (84 - 94)  BP: 134/76 (24 Jul 2017 08:20) (125/69 - 145/71)  BP(mean): 98 (24 Jul 2017 08:20) (91 - 105)  RR: 16 (24 Jul 2017 08:20) (15 - 16)  SpO2: 96% (24 Jul 2017 08:20) (94% - 97%)    PE:  NAD, conversant, respond appropriately, following commands  NC/AT, EOMI, face grossly symmetric  no neck pain or meningismus, no nuchal rigidity.    small cut to right nasal ala with surrounding erythema   vipin MARTINEZ    Labs:  BCx (7/20): +Strep pneumo, pansensitive   (7/21): NGTD  Nasal cx (7/21):coag neg Staph, s/s pending     CT head d/w Dr Stoddard  Thin skullbase over bilateral mastoid and sphenoid,   Extradural and intradural air in left temporal and frontal region    Assessment/Plan:    65yo w pneumocephalus likely early OM w small effusion and Strep pneumo bacteremia at OSH. Repeat cultures negative so far.     - will re-attempt MRI w/wout brain/skull base/sinuses today   - f/u cultures   - ID following (Dr Cordon), appreciate recs            -cefepime 2g IV q8 -> can likely transition to ceftriaxone            -Flagyl 500mg q6           -Vanco 1.25g q8           -repeat cx q48hrs if febrile   -prn pain control, tylenol for fever   -phenergan/zofran prn nausea  -Regular diet  - baci to R naris  -hold bowel regimen for loose stools   -DVT ppx: SCDs, SQH, ambulate  -[facility] at [phone] with any questions/concerns.    Dispo: SDU    Seen with Chief. Discussed with attending who agrees with plan above.

## 2017-07-25 LAB
ANION GAP SERPL CALC-SCNC: 17 MMOL/L — SIGNIFICANT CHANGE UP (ref 5–17)
BUN SERPL-MCNC: 6 MG/DL — LOW (ref 7–23)
CALCIUM SERPL-MCNC: 8.9 MG/DL — SIGNIFICANT CHANGE UP (ref 8.4–10.5)
CHLORIDE SERPL-SCNC: 94 MMOL/L — LOW (ref 96–108)
CO2 SERPL-SCNC: 22 MMOL/L — SIGNIFICANT CHANGE UP (ref 22–31)
CREAT SERPL-MCNC: 0.5 MG/DL — SIGNIFICANT CHANGE UP (ref 0.5–1.3)
GLUCOSE SERPL-MCNC: 110 MG/DL — HIGH (ref 70–99)
HCT VFR BLD CALC: 37.6 % — SIGNIFICANT CHANGE UP (ref 34.5–45)
HGB BLD-MCNC: 12.2 G/DL — SIGNIFICANT CHANGE UP (ref 11.5–15.5)
MAGNESIUM SERPL-MCNC: 1.8 MG/DL — SIGNIFICANT CHANGE UP (ref 1.6–2.6)
MCHC RBC-ENTMCNC: 26.9 PG — LOW (ref 27–34)
MCHC RBC-ENTMCNC: 32.4 G/DL — SIGNIFICANT CHANGE UP (ref 32–36)
MCV RBC AUTO: 83 FL — SIGNIFICANT CHANGE UP (ref 80–100)
PHOSPHATE SERPL-MCNC: 3.4 MG/DL — SIGNIFICANT CHANGE UP (ref 2.5–4.5)
PLATELET # BLD AUTO: 265 K/UL — SIGNIFICANT CHANGE UP (ref 150–400)
POTASSIUM SERPL-MCNC: 3 MMOL/L — LOW (ref 3.5–5.3)
POTASSIUM SERPL-SCNC: 3 MMOL/L — LOW (ref 3.5–5.3)
RBC # BLD: 4.53 M/UL — SIGNIFICANT CHANGE UP (ref 3.8–5.2)
RBC # FLD: 12.9 % — SIGNIFICANT CHANGE UP (ref 10.3–16.9)
SODIUM SERPL-SCNC: 133 MMOL/L — LOW (ref 135–145)
WBC # BLD: 8.5 K/UL — SIGNIFICANT CHANGE UP (ref 3.8–10.5)
WBC # FLD AUTO: 8.5 K/UL — SIGNIFICANT CHANGE UP (ref 3.8–10.5)

## 2017-07-25 RX ORDER — MUPIROCIN 20 MG/G
1 OINTMENT TOPICAL
Qty: 0 | Refills: 0 | Status: DISCONTINUED | OUTPATIENT
Start: 2017-07-25 | End: 2017-07-28

## 2017-07-25 RX ORDER — ONDANSETRON 8 MG/1
4 TABLET, FILM COATED ORAL EVERY 6 HOURS
Qty: 0 | Refills: 0 | Status: COMPLETED | OUTPATIENT
Start: 2017-07-25 | End: 2017-07-25

## 2017-07-25 RX ADMIN — ONDANSETRON 4 MILLIGRAM(S): 8 TABLET, FILM COATED ORAL at 19:57

## 2017-07-25 RX ADMIN — Medication 125 MILLIGRAM(S): at 22:03

## 2017-07-25 RX ADMIN — HEPARIN SODIUM 5000 UNIT(S): 5000 INJECTION INTRAVENOUS; SUBCUTANEOUS at 05:28

## 2017-07-25 RX ADMIN — Medication 1 SPRAY(S): at 23:32

## 2017-07-25 RX ADMIN — CEFTRIAXONE 100 GRAM(S): 500 INJECTION, POWDER, FOR SOLUTION INTRAMUSCULAR; INTRAVENOUS at 05:28

## 2017-07-25 RX ADMIN — MUPIROCIN 1 APPLICATION(S): 20 OINTMENT TOPICAL at 12:47

## 2017-07-25 RX ADMIN — Medication 125 MILLIGRAM(S): at 05:28

## 2017-07-25 RX ADMIN — LACTOBACILLUS ACIDOPH-L.BULGARICUS 1 MILLION CELL CHEWABLE TABLET 1 TABLET(S): at 12:47

## 2017-07-25 RX ADMIN — Medication 100 MILLIGRAM(S): at 04:15

## 2017-07-25 RX ADMIN — HEPARIN SODIUM 5000 UNIT(S): 5000 INJECTION INTRAVENOUS; SUBCUTANEOUS at 14:04

## 2017-07-25 RX ADMIN — Medication 1 SPRAY(S): at 12:47

## 2017-07-25 RX ADMIN — Medication 325 MILLIGRAM(S): at 09:17

## 2017-07-25 RX ADMIN — Medication 100 MILLIGRAM(S): at 17:47

## 2017-07-25 RX ADMIN — HEPARIN SODIUM 5000 UNIT(S): 5000 INJECTION INTRAVENOUS; SUBCUTANEOUS at 22:03

## 2017-07-25 RX ADMIN — Medication 150 MICROGRAM(S): at 05:33

## 2017-07-25 RX ADMIN — Medication 600 MILLIGRAM(S): at 07:18

## 2017-07-25 RX ADMIN — Medication 1 APPLICATION(S): at 05:34

## 2017-07-25 RX ADMIN — Medication 1 SPRAY(S): at 17:47

## 2017-07-25 RX ADMIN — Medication 600 MILLIGRAM(S): at 07:17

## 2017-07-25 RX ADMIN — Medication 100 MILLIGRAM(S): at 22:03

## 2017-07-25 RX ADMIN — Medication 100 MILLIGRAM(S): at 09:16

## 2017-07-25 RX ADMIN — MUPIROCIN 1 APPLICATION(S): 20 OINTMENT TOPICAL at 17:47

## 2017-07-25 RX ADMIN — CEFTRIAXONE 100 GRAM(S): 500 INJECTION, POWDER, FOR SOLUTION INTRAMUSCULAR; INTRAVENOUS at 17:47

## 2017-07-25 RX ADMIN — MUPIROCIN 1 APPLICATION(S): 20 OINTMENT TOPICAL at 23:32

## 2017-07-25 RX ADMIN — Medication 125 MILLIGRAM(S): at 15:54

## 2017-07-25 NOTE — PROGRESS NOTE ADULT - SUBJECTIVE AND OBJECTIVE BOX
INTERVAL HPI/OVERNIGHT EVENTS: patient w/ no complaints in the AM.     VITAL SIGNS:  T(F): 98.4 (17 @ 14:07)  HR: 90 (17 @ 17:00)  BP: 141/75 (17 @ 17:00)  RR: 15 (17 @ 17:00)  SpO2: 94% (17 @ 17:00)  Wt(kg): --    PHYSICAL EXAM:    Constitutional: NAD  Eyes: PERRL  Neck: supple  Respiratory: CTAB  Cardiovascular: RRR, no m/g/r  Gastrointestinal: soft, obese, NT, normoactive BS  Neurological: AAOx3    MEDICATIONS  (STANDING):  levothyroxine 150 MICROGram(s) Oral daily  metroNIDAZOLE  IVPB 500 milliGRAM(s) IV Intermittent every 6 hours  heparin  Injectable 5000 Unit(s) SubCutaneous every 8 hours  lactobacillus acidophilus and bulgaricus Chewable 1 Tablet(s) Chew daily  cefTRIAXone   IVPB 2 Gram(s) IV Intermittent every 12 hours  vancomycin  IVPB 1250 milliGRAM(s) IV Intermittent every 8 hours  dextrose 5% + sodium chloride 0.45%. 1000 milliLiter(s) (100 mL/Hr) IV Continuous <Continuous>  sodium chloride 0.65% Nasal 1 Spray(s) Both Nostrils four times a day  mupirocin 2% Ointment 1 Application(s) Topical four times a day    MEDICATIONS  (PRN):  acetaminophen   Tablet 325 milliGRAM(s) Oral every 4 hours PRN mild pain or fever >101  ibuprofen  Tablet 600 milliGRAM(s) Oral every 6 hours PRN headache  ondansetron Injectable 4 milliGRAM(s) IV Push every 6 hours PRN Nausea and/or Vomiting      Allergies    penicillin (Unknown)  sulfa drugs (Unknown)    Intolerances        LABS:                        12.2   8.5   )-----------( 265      ( 2017 07:01 )             37.6     -25    133<L>  |  94<L>  |  6<L>  ----------------------------<  110<H>  3.0<L>   |  22  |  0.50    Ca    8.9      2017 07:01  Phos  3.4     07-25  Mg     1.8             Urinalysis Basic - ( 2017 22:27 )    Color: Yellow / Appearance: Clear / S.015 / pH: x  Gluc: x / Ketone: 15 mg/dL  / Bili: NEGATIVE / Urobili: 0.2 E.U./dL   Blood: x / Protein: NEGATIVE mg/dL / Nitrite: NEGATIVE   Leuk Esterase: NEGATIVE / RBC: x / WBC x   Sq Epi: x / Non Sq Epi: x / Bacteria: x    Blood Cultures    2017: NGTD    RADIOLOGY & ADDITIONAL TESTS:    #Pneumocephalus and inadvertent bacterial invasion into CNS from invasive sinusitis, most likely Pneumococcal bacteremia right otitis. MRI suggestive of R ostomastoiditis w/ diffuse sinusitis and subdural empyema.   -c/w vanc 1250mg q8 (-_; obtain trough prior to 4th dose and redose as indicated), flagyl 500mg IV q6 (-), and CTX 2g q12 (-); unable to determine duration of abx tx based on current MRI findings; patient will need repeat imaging in 2wks in order to ascertain appropriate infection resolution but will likely need lengthy duration due to bone/CNS involvement  -f/u S. pneumo FQ sensitiities INTERVAL HPI/OVERNIGHT EVENTS: patient w/ no complaints in the AM.     VITAL SIGNS:  T(F): 98.4 (17 @ 14:07)  HR: 90 (17 @ 17:00)  BP: 141/75 (17 @ 17:00)  RR: 15 (17 @ 17:00)  SpO2: 94% (17 @ 17:00)  Wt(kg): --    PHYSICAL EXAM:    Constitutional: NAD  Eyes: PERRL  Neck: supple  Respiratory: CTAB  Cardiovascular: RRR, no m/g/r  Gastrointestinal: soft, obese, NT, normoactive BS  Neurological: AAOx3    MEDICATIONS  (STANDING):  levothyroxine 150 MICROGram(s) Oral daily  metroNIDAZOLE  IVPB 500 milliGRAM(s) IV Intermittent every 6 hours  heparin  Injectable 5000 Unit(s) SubCutaneous every 8 hours  lactobacillus acidophilus and bulgaricus Chewable 1 Tablet(s) Chew daily  cefTRIAXone   IVPB 2 Gram(s) IV Intermittent every 12 hours  vancomycin  IVPB 1250 milliGRAM(s) IV Intermittent every 8 hours  dextrose 5% + sodium chloride 0.45%. 1000 milliLiter(s) (100 mL/Hr) IV Continuous <Continuous>  sodium chloride 0.65% Nasal 1 Spray(s) Both Nostrils four times a day  mupirocin 2% Ointment 1 Application(s) Topical four times a day    MEDICATIONS  (PRN):  acetaminophen   Tablet 325 milliGRAM(s) Oral every 4 hours PRN mild pain or fever >101  ibuprofen  Tablet 600 milliGRAM(s) Oral every 6 hours PRN headache  ondansetron Injectable 4 milliGRAM(s) IV Push every 6 hours PRN Nausea and/or Vomiting      Allergies    penicillin (Unknown)  sulfa drugs (Unknown)    Intolerances        LABS:                        12.2   8.5   )-----------( 265      ( 2017 07:01 )             37.6     -25    133<L>  |  94<L>  |  6<L>  ----------------------------<  110<H>  3.0<L>   |  22  |  0.50    Ca    8.9      2017 07:01  Phos  3.4     07-25  Mg     1.8             Urinalysis Basic - ( 2017 22:27 )    Color: Yellow / Appearance: Clear / S.015 / pH: x  Gluc: x / Ketone: 15 mg/dL  / Bili: NEGATIVE / Urobili: 0.2 E.U./dL   Blood: x / Protein: NEGATIVE mg/dL / Nitrite: NEGATIVE   Leuk Esterase: NEGATIVE / RBC: x / WBC x   Sq Epi: x / Non Sq Epi: x / Bacteria: x    Blood Cultures    2017: NGTD    RADIOLOGY & ADDITIONAL TESTS:    #Pneumocephalus and inadvertent bacterial invasion into CNS from invasive sinusitis, most likely Pneumococcal bacteremia right otitis. MRI w/ resolved gas in brain, diffuse R pachymeningitis and frontoleptomeningitis, R ostomastoiditis w/ diffuse sinusitis and R subdural empyema.   -c/w vanc 1250mg q8 (-_; obtain trough prior to 4th dose and redose as indicated), flagyl 500mg IV q6 (-), and CTX 2g q12 (-); unable to determine duration of abx tx based on current MRI findings; patient will need repeat imaging in 2wks in order to ascertain appropriate infection resolution but will likely need lengthy duration due to bone/CNS involvement  -f/u S. pneumo FQ sensitivities INTERVAL HPI/OVERNIGHT EVENTS: patient w/ no complaints in the AM.     VITAL SIGNS:  T(F): 98.4 (17 @ 14:07)  HR: 90 (17 @ 17:00)  BP: 141/75 (17 @ 17:00)  RR: 15 (17 @ 17:00)  SpO2: 94% (17 @ 17:00)  Wt(kg): --    PHYSICAL EXAM:    Constitutional: NAD  Eyes: PERRL  Neck: supple  Respiratory: CTAB  Cardiovascular: RRR, no m/g/r  Gastrointestinal: soft, obese, NT, normoactive BS  Neurological: AAOx3    MEDICATIONS  (STANDING):  levothyroxine 150 MICROGram(s) Oral daily  metroNIDAZOLE  IVPB 500 milliGRAM(s) IV Intermittent every 6 hours  heparin  Injectable 5000 Unit(s) SubCutaneous every 8 hours  lactobacillus acidophilus and bulgaricus Chewable 1 Tablet(s) Chew daily  cefTRIAXone   IVPB 2 Gram(s) IV Intermittent every 12 hours  vancomycin  IVPB 1250 milliGRAM(s) IV Intermittent every 8 hours  dextrose 5% + sodium chloride 0.45%. 1000 milliLiter(s) (100 mL/Hr) IV Continuous <Continuous>  sodium chloride 0.65% Nasal 1 Spray(s) Both Nostrils four times a day  mupirocin 2% Ointment 1 Application(s) Topical four times a day    MEDICATIONS  (PRN):  acetaminophen   Tablet 325 milliGRAM(s) Oral every 4 hours PRN mild pain or fever >101  ibuprofen  Tablet 600 milliGRAM(s) Oral every 6 hours PRN headache  ondansetron Injectable 4 milliGRAM(s) IV Push every 6 hours PRN Nausea and/or Vomiting      Allergies    penicillin (Unknown)  sulfa drugs (Unknown)    Intolerances        LABS:                        12.2   8.5   )-----------( 265      ( 2017 07:01 )             37.6     -25    133<L>  |  94<L>  |  6<L>  ----------------------------<  110<H>  3.0<L>   |  22  |  0.50    Ca    8.9      2017 07:01  Phos  3.4     07-25  Mg     1.8             Urinalysis Basic - ( 2017 22:27 )    Color: Yellow / Appearance: Clear / S.015 / pH: x  Gluc: x / Ketone: 15 mg/dL  / Bili: NEGATIVE / Urobili: 0.2 E.U./dL   Blood: x / Protein: NEGATIVE mg/dL / Nitrite: NEGATIVE   Leuk Esterase: NEGATIVE / RBC: x / WBC x   Sq Epi: x / Non Sq Epi: x / Bacteria: x    Blood Cultures    2017: NGTD

## 2017-07-25 NOTE — PROGRESS NOTE ADULT - SUBJECTIVE AND OBJECTIVE BOX
Subjective: Patient without complaints this morning, sitting comfortably in bed. Patient was thought to be altered yesterday due to Ativan which she received to undergo MRI imaging.    T(C): 36.7 (07-25-17 @ 06:22), Max: 38.9 (07-24-17 @ 18:26)  HR: 88 (07-25-17 @ 05:18) (86 - 102)  BP: 142/75 (07-25-17 @ 05:18) (134/72 - 164/85)  RR: 16 (07-25-17 @ 05:18) (16 - 19)  SpO2: 95% (07-25-17 @ 05:18) (92% - 96%)  Wt(kg): --    Exam: NAD, AAOx3  PERRL. EOMI  Neck FROM, nontender  CNs II-XII intact. 5/5 str x4 exts. Sensation to LT intact. Following commands.    CBC Full  -  ( 25 Jul 2017 07:01 )  WBC Count : 8.5 K/uL  Hemoglobin : 12.2 g/dL  Hematocrit : 37.6 %  Platelet Count - Automated : 265 K/uL  Mean Cell Volume : 83.0 fL  Mean Cell Hemoglobin : 26.9 pg  Mean Cell Hemoglobin Concentration : 32.4 g/dL      07-25    133<L>  |  94<L>  |  6<L>  ----------------------------<  110<H>  3.0<L>   |  22  |  0.50    Ca    8.9      25 Jul 2017 07:01  Phos  3.4     07-25  Mg     1.8     07-25        Imaging: MRI Brain consistent with dural enhancement, some concern for subdural empyema. No intracranial findings.    Assessment/Plan: 66 year old female with frontal sinusitis, right mastoiditis with pneumocephalus, now with concern for meningitis    -Continue antibiotics, ID consulted,  -Consider PICC line for antiobiotic therapy,  -Thyroid studies due to history of hypothyroid.  -No neurosurgical intervention at this time,  -Case d/w Dr. Moreland and ENT team,  -Will continue to follow.

## 2017-07-25 NOTE — DIETITIAN INITIAL EVALUATION ADULT. - OTHER INFO
Pt admitted with pneumocephalus.  Pt is s/p MRI, however, remains NPO pending neuro consult.  Pt with no recent complaints of GI distress or pain.  NFKA.  Skin: intact.

## 2017-07-25 NOTE — PROGRESS NOTE ADULT - ASSESSMENT
Pneumocephalus and inadvertent bacterial invasion into CNS from mastoiditis and small subdural collection right temporal lobe    Pneumococcal bacteremia right otitis. MRI w/ resolved gas in brain, diffuse R pachymeningitis and frontoleptomeningitis, R ostomastoiditis w/ diffuse sinusitis and R subdural empyema.     RECOMMEND  -c/w vanc 1250mg q8 (7/21), flagyl 500mg IV q6 (7/21-), and CTX 2g q12 (7/24-)  -Duration of abx tx will likely requires several weeks - due to bone involvement  - Will need repeat imaging (in 2wks?) in order to ascertain appropriate progress/response to treatment  -f/u S. pneumo FQ sensitivities

## 2017-07-25 NOTE — DIETITIAN INITIAL EVALUATION ADULT. - ENERGY NEEDS
IBW used as pt is currently greater than 120% ideal body weight.   Increased needs secondary to current medical state and possible pre-op

## 2017-07-25 NOTE — PROGRESS NOTE ADULT - SUBJECTIVE AND OBJECTIVE BOX
RADHA-HNS PROGRESS NOTE    HPI: 66F transfer from [facility] after ENT was consulted for R otalgia and temporal and frontal fossa pneumocephalus on CT max-fac. Reports that she had been suffering from seasonal allergies for 2 weeks and using a neti-pot and saline spray to rinse her sinus. However, 2 days ago, she suddenly developed moderate/severe otalgia and headache on the right. Endorses excessive blowing of nose during this period. Denies trauma. Pt had been seen at urgent care, but no instrumentation of her ear or sinuses.     7/21: Appreciated neurosurgical and ID input. Started on IV abx as per ID. Not for surgical intervention at this point. Monitoring for signs of meningism.  7/22:  Patient febrile to 103, mild tachycardia to 108 oVSS overnight. Pancultured. Vanc trough subtherapeutic at 10.8 so increased to 1.25 q8h. Continues on flagyl/cefepime. Nausea, no emesis, improved with phenergan. Improvement in headache. No meningeal signs. Denies photophobia.   7/23: ALANIS overnight. Febrile to 101x1 at 2PM but otherwise AF. tachycardia improved. HR 86-94. Mild headache improved with motrin. Feeling better. Nausea improved. VT 17.7.  7/24: ALANIS overnight. AFVSS. Attempted MRI however patient refused 2/2 nausea and anxiety. Still with nausea associated with vanc tx slightly improved with prn anti-nausea. No meningeal signs. Cefepime transitioned to ceftriaxone.   7/25: Had MRI brain/orbits/face yesterday. C/f meningitis and possible frontal empyema. Seen by NSGY who rec no surgical intervention. Became acutely delirious overnight thought to be 2/2 to ativan required for MRI, placed on 1:1 but back to baseline mental status and appropriate this AM. Tm102 at 5PM otherwise AFVSS. Pancultured. Continues on broad spectrum abx.     Vital Signs Last 24 Hrs  T(C): 36.6 (25 Jul 2017 10:23), Max: 38.9 (24 Jul 2017 18:26)  T(F): 97.8 (25 Jul 2017 10:23), Max: 102 (24 Jul 2017 18:26)  HR: 86 (25 Jul 2017 09:00) (86 - 102)  BP: 134/76 (25 Jul 2017 09:00) (134/72 - 164/85)  BP(mean): 97 (25 Jul 2017 09:00) (96 - 117)  RR: 14 (25 Jul 2017 09:00) (14 - 19)  SpO2: 99% (25 Jul 2017 09:00) (92% - 99%)    PE:  NAD, conversant, respond appropriately, following commands  NC/AT, EOMI, face grossly symmetric  no neck pain or meningismus, no nuchal rigidity   small cut to right nasal ala with surrounding erythema and swelling slightly worse than prior, superficial crusting, no fluctuance or collection   vipin MARTINEZ    Labs:                        12.2   8.5   )-----------( 265      ( 25 Jul 2017 07:01 )             37.6     BCx (7/20): +Strep pneumo, pansensitive   (7/21): NGTD  (7/24): NGTD  Nasal cx (7/21):coag neg Staph, clinda/PCN resistant     Imaging:   CT head d/w Dr Stoddard  Thin skullbase over bilateral mastoid and sphenoid,   Extradural and intradural air in left temporal and frontal region    MRI 7/24:   MRI brain/temporal bones demonstrates diffuse right pachymeningitis,   small (4 mm right subdural collection with frontal empyema, and right   frontoparietal leptomeningitis. No evidence of cerebritis or brain   abscess at this time. Intracranial gas appears to be resolving versus   resolved.  Thin section imaging shows no evidence of right temporal cephalocele into   the right tympanomastoid cavity at this time. Findings of right   otomastoiditis and diffuse sinusitis, as above.      Assessment/Plan:    65yo w pneumocephalus likely early OM w small effusion and Strep pneumo bacteremia at OSH. Repeat cultures negative so far. MRI w/ e/o meningitis and 4mm empyema.      - f/u cultures   - ID following (Dr Cordon), appreciate recs            -Ceftriaxone 2g q12            -Flagyl 500mg q6           -Vanco 1.25g q8           -repeat cx q48hrs if febrile   -s/p cefepime 7/20-24  -NSGY following - no acute surgical intervention, appreciate ID input, TFT's  -prn pain control, tylenol for fever   -phenergan/zofran prn nausea   -Regular diet as tolerated   -bactroban to R naris  -hold bowel regimen for loose stools   -probiotic   -daily CBC, TFTs in AM   -Continue home synthroid   -DVT ppx: SCDs, SQH, ambulate  -[facility] at [phone] with any questions/concerns.    Dispo: SDU    Seen with Chief. Discussed with attending who agrees with plan above. RADHA-HNS PROGRESS NOTE    HPI: 66F transfer from [facility] after ENT was consulted for R otalgia and temporal and frontal fossa pneumocephalus on CT max-fac. Reports that she had been suffering from seasonal allergies for 2 weeks and using a neti-pot and saline spray to rinse her sinus. However, 2 days ago, she suddenly developed moderate/severe otalgia and headache on the right. Endorses excessive blowing of nose during this period. Denies trauma. Pt had been seen at urgent care, but no instrumentation of her ear or sinuses.     7/21: Appreciated neurosurgical and ID input. Started on IV abx as per ID. Not for surgical intervention at this point. Monitoring for signs of meningism.  7/22:  Patient febrile to 103, mild tachycardia to 108 oVSS overnight. Pancultured. Vanc trough subtherapeutic at 10.8 so increased to 1.25 q8h. Continues on flagyl/cefepime. Nausea, no emesis, improved with phenergan. Improvement in headache. No meningeal signs. Denies photophobia.   7/23: ALANIS overnight. Febrile to 101x1 at 2PM but otherwise AF. tachycardia improved. HR 86-94. Mild headache improved with motrin. Feeling better. Nausea improved. VT 17.7.  7/24: ALANIS overnight. AFVSS. Attempted MRI however patient refused 2/2 nausea and anxiety. Still with nausea associated with vanc tx slightly improved with prn anti-nausea. No meningeal signs. Cefepime transitioned to ceftriaxone.   7/25: Had MRI brain/orbits/face yesterday. C/f meningitis and possible frontal empyema. Seen by NSGY who rec no surgical intervention. Became acutely delirious overnight thought to be 2/2 to ativan required for MRI, placed on 1:1 but back to baseline mental status and appropriate this AM. Tm102 at 5PM otherwise AFVSS. Pancultured. Continues on broad spectrum abx.     Vital Signs Last 24 Hrs  T(C): 36.6 (25 Jul 2017 10:23), Max: 38.9 (24 Jul 2017 18:26)  T(F): 97.8 (25 Jul 2017 10:23), Max: 102 (24 Jul 2017 18:26)  HR: 86 (25 Jul 2017 09:00) (86 - 102)  BP: 134/76 (25 Jul 2017 09:00) (134/72 - 164/85)  BP(mean): 97 (25 Jul 2017 09:00) (96 - 117)  RR: 14 (25 Jul 2017 09:00) (14 - 19)  SpO2: 99% (25 Jul 2017 09:00) (92% - 99%)    PE:  NAD, conversant, respond appropriately, following commands  NC/AT, EOMI, face grossly symmetric  no neck pain or meningismus, no nuchal rigidity   small cut to right nasal ala with surrounding erythema and swelling slightly worse than prior, superficial crusting, no fluctuance or collection   vipin MARTINEZ    Labs:                        12.2   8.5   )-----------( 265      ( 25 Jul 2017 07:01 )             37.6     BCx (7/20): +Strep pneumo, pansensitive   (7/21): NGTD  (7/24): NGTD  Nasal cx (7/21):coag neg Staph, clinda/PCN resistant     Imaging:   CT head d/w Dr Stoddard  Thin skullbase over bilateral mastoid and sphenoid,   Extradural and intradural air in left temporal and frontal region    MRI 7/24:   MRI brain/temporal bones demonstrates diffuse right pachymeningitis,   small (4 mm right subdural collection with frontal empyema, and right   frontoparietal leptomeningitis. No evidence of cerebritis or brain   abscess at this time. Intracranial gas appears to be resolving versus   resolved.  Thin section imaging shows no evidence of right temporal cephalocele into   the right tympanomastoid cavity at this time. Findings of right   otomastoiditis and diffuse sinusitis, as above.      Assessment/Plan:    67yo w pneumocephalus likely early OM w small effusion and Strep pneumo bacteremia and septicemia at OSH with ID following. Repeat cultures negative so far. MRI w/ e/o meningitis and 4mm empyema.      - f/u cultures   - ID following (Dr Cordon), appreciate recs            -Ceftriaxone 2g q12            -Flagyl 500mg q6           -Vanco 1.25g q8           -repeat cx q48hrs if febrile   -s/p cefepime 7/20-24  -NSGY following - no acute surgical intervention, appreciate ID input, TFT's  -prn pain control, tylenol for fever   -phenergan/zofran prn nausea   -Regular diet as tolerated   -bactroban to R naris  -hold bowel regimen for loose stools   -probiotic   -daily CBC, TFTs in AM   -Continue home synthroid   -DVT ppx: SCDs, SQH, ambulate  -[facility] at [phone] with any questions/concerns.    Dispo: SDU    Seen with Chief. Discussed with attending who agrees with plan above.

## 2017-07-26 LAB
CULTURE RESULTS: SIGNIFICANT CHANGE UP
CULTURE RESULTS: SIGNIFICANT CHANGE UP
HCT VFR BLD CALC: 37.6 % — SIGNIFICANT CHANGE UP (ref 34.5–45)
HGB BLD-MCNC: 12.2 G/DL — SIGNIFICANT CHANGE UP (ref 11.5–15.5)
MCHC RBC-ENTMCNC: 27.1 PG — SIGNIFICANT CHANGE UP (ref 27–34)
MCHC RBC-ENTMCNC: 32.4 G/DL — SIGNIFICANT CHANGE UP (ref 32–36)
MCV RBC AUTO: 83.4 FL — SIGNIFICANT CHANGE UP (ref 80–100)
PLATELET # BLD AUTO: 271 K/UL — SIGNIFICANT CHANGE UP (ref 150–400)
RBC # BLD: 4.51 M/UL — SIGNIFICANT CHANGE UP (ref 3.8–5.2)
RBC # FLD: 13.1 % — SIGNIFICANT CHANGE UP (ref 10.3–16.9)
SPECIMEN SOURCE: SIGNIFICANT CHANGE UP
SPECIMEN SOURCE: SIGNIFICANT CHANGE UP
T4 AB SER-ACNC: 9.15 UG/DL — SIGNIFICANT CHANGE UP (ref 3.17–11.72)
TSH SERPL-MCNC: 2.66 UIU/ML — SIGNIFICANT CHANGE UP (ref 0.35–4.94)
WBC # BLD: 9.2 K/UL — SIGNIFICANT CHANGE UP (ref 3.8–10.5)
WBC # FLD AUTO: 9.2 K/UL — SIGNIFICANT CHANGE UP (ref 3.8–10.5)

## 2017-07-26 PROCEDURE — 36569 INSJ PICC 5 YR+ W/O IMAGING: CPT

## 2017-07-26 PROCEDURE — 71010: CPT | Mod: 26

## 2017-07-26 RX ORDER — SODIUM CHLORIDE 9 MG/ML
10 INJECTION INTRAMUSCULAR; INTRAVENOUS; SUBCUTANEOUS
Qty: 0 | Refills: 0 | Status: DISCONTINUED | OUTPATIENT
Start: 2017-07-26 | End: 2017-07-28

## 2017-07-26 RX ORDER — MAGNESIUM OXIDE 400 MG ORAL TABLET 241.3 MG
400 TABLET ORAL ONCE
Qty: 0 | Refills: 0 | Status: COMPLETED | OUTPATIENT
Start: 2017-07-26 | End: 2017-07-26

## 2017-07-26 RX ORDER — VALACYCLOVIR 500 MG/1
1000 TABLET, FILM COATED ORAL
Qty: 0 | Refills: 0 | Status: DISCONTINUED | OUTPATIENT
Start: 2017-07-26 | End: 2017-07-28

## 2017-07-26 RX ORDER — SODIUM CHLORIDE 9 MG/ML
20 INJECTION INTRAMUSCULAR; INTRAVENOUS; SUBCUTANEOUS ONCE
Qty: 0 | Refills: 0 | Status: DISCONTINUED | OUTPATIENT
Start: 2017-07-26 | End: 2017-07-28

## 2017-07-26 RX ORDER — ONDANSETRON 8 MG/1
4 TABLET, FILM COATED ORAL EVERY 8 HOURS
Qty: 0 | Refills: 0 | Status: DISCONTINUED | OUTPATIENT
Start: 2017-07-26 | End: 2017-07-28

## 2017-07-26 RX ORDER — POTASSIUM CHLORIDE 20 MEQ
40 PACKET (EA) ORAL EVERY 4 HOURS
Qty: 0 | Refills: 0 | Status: COMPLETED | OUTPATIENT
Start: 2017-07-26 | End: 2017-07-27

## 2017-07-26 RX ORDER — LIDOCAINE HCL 20 MG/ML
50 VIAL (ML) INJECTION ONCE
Qty: 0 | Refills: 0 | Status: COMPLETED | OUTPATIENT
Start: 2017-07-26 | End: 2017-07-26

## 2017-07-26 RX ORDER — SODIUM CHLORIDE 9 MG/ML
10 INJECTION INTRAMUSCULAR; INTRAVENOUS; SUBCUTANEOUS EVERY 12 HOURS
Qty: 0 | Refills: 0 | Status: DISCONTINUED | OUTPATIENT
Start: 2017-07-26 | End: 2017-07-28

## 2017-07-26 RX ADMIN — Medication 125 MILLIGRAM(S): at 22:58

## 2017-07-26 RX ADMIN — Medication 1 SPRAY(S): at 06:37

## 2017-07-26 RX ADMIN — MUPIROCIN 1 APPLICATION(S): 20 OINTMENT TOPICAL at 06:37

## 2017-07-26 RX ADMIN — Medication 325 MILLIGRAM(S): at 13:17

## 2017-07-26 RX ADMIN — Medication 125 MILLIGRAM(S): at 06:34

## 2017-07-26 RX ADMIN — CEFTRIAXONE 100 GRAM(S): 500 INJECTION, POWDER, FOR SOLUTION INTRAMUSCULAR; INTRAVENOUS at 19:03

## 2017-07-26 RX ADMIN — Medication 100 MILLIGRAM(S): at 10:42

## 2017-07-26 RX ADMIN — Medication 125 MILLIGRAM(S): at 15:05

## 2017-07-26 RX ADMIN — Medication 1 SPRAY(S): at 12:03

## 2017-07-26 RX ADMIN — VALACYCLOVIR 1000 MILLIGRAM(S): 500 TABLET, FILM COATED ORAL at 19:27

## 2017-07-26 RX ADMIN — Medication 100 MILLIGRAM(S): at 16:32

## 2017-07-26 RX ADMIN — Medication 100 MILLIGRAM(S): at 22:58

## 2017-07-26 RX ADMIN — Medication 100 MILLIGRAM(S): at 04:08

## 2017-07-26 RX ADMIN — MUPIROCIN 1 APPLICATION(S): 20 OINTMENT TOPICAL at 12:03

## 2017-07-26 RX ADMIN — Medication 150 MICROGRAM(S): at 06:34

## 2017-07-26 RX ADMIN — Medication 50 MILLILITER(S): at 11:00

## 2017-07-26 RX ADMIN — MAGNESIUM OXIDE 400 MG ORAL TABLET 400 MILLIGRAM(S): 241.3 TABLET ORAL at 22:59

## 2017-07-26 RX ADMIN — CEFTRIAXONE 100 GRAM(S): 500 INJECTION, POWDER, FOR SOLUTION INTRAMUSCULAR; INTRAVENOUS at 06:34

## 2017-07-26 RX ADMIN — Medication 40 MILLIEQUIVALENT(S): at 22:59

## 2017-07-26 RX ADMIN — Medication 600 MILLIGRAM(S): at 01:50

## 2017-07-26 RX ADMIN — HEPARIN SODIUM 5000 UNIT(S): 5000 INJECTION INTRAVENOUS; SUBCUTANEOUS at 13:18

## 2017-07-26 RX ADMIN — HEPARIN SODIUM 5000 UNIT(S): 5000 INJECTION INTRAVENOUS; SUBCUTANEOUS at 22:59

## 2017-07-26 RX ADMIN — HEPARIN SODIUM 5000 UNIT(S): 5000 INJECTION INTRAVENOUS; SUBCUTANEOUS at 06:34

## 2017-07-26 RX ADMIN — LACTOBACILLUS ACIDOPH-L.BULGARICUS 1 MILLION CELL CHEWABLE TABLET 1 TABLET(S): at 12:03

## 2017-07-26 RX ADMIN — ONDANSETRON 4 MILLIGRAM(S): 8 TABLET, FILM COATED ORAL at 22:59

## 2017-07-26 RX ADMIN — Medication 1 SPRAY(S): at 19:03

## 2017-07-26 RX ADMIN — MUPIROCIN 1 APPLICATION(S): 20 OINTMENT TOPICAL at 19:03

## 2017-07-26 RX ADMIN — Medication 600 MILLIGRAM(S): at 01:21

## 2017-07-26 NOTE — PROGRESS NOTE ADULT - ATTENDING COMMENTS
Pt seen and examined and evaluation completed by me in person
Pt seen and examined  MRI reviewed with neuroradiologist    Will further discuss with ENT the plan for the next 2 weeks  Please continue hospitalization for now
Pt seen and examined  Clinically improving  Right nostril with a lesion appearing impetiginous but also like scabbing vesicle - concern for HSV    Hesitant to streamline abx just to Ceftriaxone although this appears sufficient based upon Bcxs results.  Would continue Vanco and Metronidazole for now as polymicrobial otomastoiditis not ruled out without the culture.  Dr Barney to assume ID service attending responsibility starting tomorrow    No need for droplet or other isolation precautions - this was also discussed with epidemiology

## 2017-07-26 NOTE — PROGRESS NOTE ADULT - SUBJECTIVE AND OBJECTIVE BOX
RADHA-[facility]NS PROGRESS NOTE    HPI: 66F transfer from [facility] after ENT was consulted for R otalgia and temporal and frontal fossa pneumocephalus on CT max-fac. Reports that she had been suffering from seasonal allergies for 2 weeks and using a neti-pot and saline spray to rinse her sinus. However, 2 days ago, she suddenly developed moderate/severe otalgia and headache on the right. Endorses excessive blowing of nose during this period. Denies trauma. Pt had been seen at urgent care, but no instrumentation of her ear or sinuses.     7/21: Appreciated neurosurgical and ID input. Started on IV abx as per ID. Not for surgical intervention at this point. Monitoring for signs of meningism.  7/22:  Patient febrile to 103, mild tachycardia to 108 oVSS overnight. Pancultured. Vanc trough subtherapeutic at 10.8 so increased to 1.25 q8h. Continues on flagyl/cefepime. Nausea, no emesis, improved with phenergan. Improvement in headache. No meningeal signs. Denies photophobia.   7/23: ALANIS overnight. Febrile to 101x1 at 2PM but otherwise AF. tachycardia improved. HR 86-94. Mild headache improved with motrin. Feeling better. Nausea improved. VT 17.7.  7/24: ALANIS overnight. AFVSS. Attempted MRI however patient refused 2/2 nausea and anxiety. Still with nausea associated with vanc tx slightly improved with prn anti-nausea. No meningeal signs. Cefepime transitioned to ceftriaxone.   7/25: Had MRI brain/orbits/face yesterday. C/f meningitis and possible frontal empyema. Seen by NSGY who rec no surgical intervention. Became acutely delirious overnight thought to be 2/2 to ativan required for MRI, placed on 1:1 but back to baseline mental status and appropriate this AM. Tm102 at 5PM otherwise AFVSS. Pancultured. Continues on broad spectrum abx.   7/26: Pt stable. Feels ok. Delirium resolved. no acute events    Vital Signs Last 24 Hrs  T(C): 36.2 (26 Jul 2017 05:56), Max: 37.8 (25 Jul 2017 18:03)  T(F): 97.1 (26 Jul 2017 05:56), Max: 100 (25 Jul 2017 18:03)  HR: 74 (26 Jul 2017 04:48) (74 - 90)  BP: 112/64 (26 Jul 2017 04:48) (112/64 - 141/75)  BP(mean): 83 (26 Jul 2017 04:48) (83 - 99)  RR: 16 (26 Jul 2017 04:48) (14 - 18)  SpO2: 96% (26 Jul 2017 04:48) (94% - 99%)    PE:  NAD, conversant, respond appropriately, following commands  NC/AT, EOMI, face grossly symmetric  no neck pain or meningismus, no nuchal rigidity   MARTINEZ, wwp    AS: normal exam. EAC patent, TM clear  AD: Serous effusion of middle ear. No evidence of infection. EAC patent.     BCx (7/20): +Strep pneumo, pansensitive   (7/21): NGTD  (7/24): NGTD  Nasal cx (7/21):coag neg Staph, clinda/PCN resistant     Imaging:   CT head d/w Dr Stoddard  Thin skullbase over bilateral mastoid and sphenoid,   Extradural and intradural air in left temporal and frontal region    MRI 7/24:   MRI brain/temporal bones demonstrates diffuse right pachymeningitis,   small (4 mm right subdural collection with frontal empyema, and right   frontoparietal leptomeningitis. No evidence of cerebritis or brain   abscess at this time. Intracranial gas appears to be resolving versus   resolved.  Thin section imaging shows no evidence of right temporal cephalocele into   the right tympanomastoid cavity at this time. Findings of right   otomastoiditis and diffuse sinusitis, as above.    Assessment/Plan:    67yo w pneumocephalus likely early OM w small effusion and Strep pneumo bacteremia and septicemia at OSH with ID following. Repeat cultures negative so far. MRI w/ e/o meningitis and 4mm empyema.      - f/u cultures   - ID following (Dr Cordon), appreciate recs            -Ceftriaxone 2g q12            -Flagyl 500mg q6           -Vanco 1.25g q8           -repeat cx q48hrs if febrile             s/p cefepime 7/20-24  -NSGY following - no acute surgical intervention, appreciate ID input, TFT's  -prn pain control, tylenol for fever   -phenergan/zofran prn nausea   -Regular diet as tolerated   -bactroban to R naris  -hold bowel regimen for loose stools   -probiotic   -daily CBC, TFTs in AM   -Continue home synthroid   -DVT ppx: SCDs, SQH, ambulate  -[facility] at [phone] with any questions/concerns.    Dispo: SDU    Seen with Chief. Discussed with attending who agrees with plan above.

## 2017-07-26 NOTE — PROGRESS NOTE ADULT - SUBJECTIVE AND OBJECTIVE BOX
INTERVAL HPI/OVERNIGHT EVENTS: pt reports feeling as though her ear were clogged; otherwise no complaints.    VITAL SIGNS:  T(F): 99.8 (17 @ 18:29)  HR: 88 (17 @ 15:21)  BP: 130/65 (17 @ 15:21)  RR: 16 (17 @ 15:21)  SpO2: 95% (17 @ 15:21)  Wt(kg): --    PHYSICAL EXAM:    Constitutional: NAD  Eyes: PERRL  Neck: supple  Respiratory: CTAB  Cardiovascular: RRR, no m/g/r  Gastrointestinal: soft, obese, NT, normoactive BS  Neurological: AAOx3    MEDICATIONS  (STANDING):  levothyroxine 150 MICROGram(s) Oral daily  metroNIDAZOLE  IVPB 500 milliGRAM(s) IV Intermittent every 6 hours  heparin  Injectable 5000 Unit(s) SubCutaneous every 8 hours  lactobacillus acidophilus and bulgaricus Chewable 1 Tablet(s) Chew daily  cefTRIAXone   IVPB 2 Gram(s) IV Intermittent every 12 hours  vancomycin  IVPB 1250 milliGRAM(s) IV Intermittent every 8 hours  sodium chloride 0.65% Nasal 1 Spray(s) Both Nostrils four times a day  mupirocin 2% Ointment 1 Application(s) Topical four times a day  sodium chloride 0.9% lock flush 20 milliLiter(s) IV Push once  valACYclovir 1000 milliGRAM(s) Oral two times a day    MEDICATIONS  (PRN):  acetaminophen   Tablet 325 milliGRAM(s) Oral every 4 hours PRN mild pain or fever >101  ibuprofen  Tablet 600 milliGRAM(s) Oral every 6 hours PRN headache  sodium chloride 0.9% lock flush 10 milliLiter(s) IV Push every 1 hour PRN After each medication administration  sodium chloride 0.9% lock flush 10 milliLiter(s) IV Push every 12 hours PRN Lumen of catheter NOT used  promethazine IVPB 25 milliGRAM(s) IV Intermittent every 6 hours PRN nausea      Allergies    penicillin (Unknown)  sulfa drugs (Unknown)    Intolerances        LABS:                        12.2   9.2   )-----------( 271      ( 2017 07:47 )             37.6     07-25    133<L>  |  94<L>  |  6<L>  ----------------------------<  110<H>  3.0<L>   |  22  |  0.50    Ca    8.9      2017 07:01  Phos  3.4       Mg     1.8             Urinalysis Basic - ( 2017 22:27 )    Color: Yellow / Appearance: Clear / S.015 / pH: x  Gluc: x / Ketone: 15 mg/dL  / Bili: NEGATIVE / Urobili: 0.2 E.U./dL   Blood: x / Protein: NEGATIVE mg/dL / Nitrite: NEGATIVE   Leuk Esterase: NEGATIVE / RBC: x / WBC x   Sq Epi: x / Non Sq Epi: x / Bacteria: x        RADIOLOGY & ADDITIONAL TESTS:    Culture - Blood (17 @ 21:10)    Specimen Source: .Blood Blood-Venous    Culture Results:   No growth at 1 day.    Culture - Blood (17 @ 13:50)    Specimen Source: .Blood None    Culture Results:   No growth at 5 days.    EXAM:  MR BRAIN WO - W CONTRAST                          PROCEDURE DATE:  2017    Quantity of Contrast in Vial in ml: 7.5 Contrast Used: Gadovist  Quantity of Contrast Wasted in ml: 0    IMPRESSION:     MRI brain/temporal bones demonstrates diffuse right pachymeningitis,   small (4 mm right subdural collection with frontal empyema, and right   frontoparietal leptomeningitis. No evidence of cerebritis or brain   abscess at this time. Intracranial gas appears to be resolving versus   resolved.    Thin section imaging shows no evidence of right temporal cephalocele into   the right tympanomastoid cavity at this time. Findings of right   otomastoiditis and diffuse sinusitis, as above. INTERVAL HPI/OVERNIGHT EVENTS: pt reports feeling as though her ear were clogged; otherwise no complaints. Afebrile.     VITAL SIGNS:  T(F): 99.8 (17 @ 18:29)  HR: 88 (17 @ 15:21)  BP: 130/65 (17 @ 15:21)  RR: 16 (17 @ 15:21)  SpO2: 95% (17 @ 15:21)  Wt(kg): --    PHYSICAL EXAM:    Constitutional: NAD  Eyes: PERRL  Neck: supple  Respiratory: CTAB  Cardiovascular: RRR, no m/g/r  Gastrointestinal: soft, obese, NT, normoactive BS  Neurological: AAOx3    MEDICATIONS  (STANDING):  levothyroxine 150 MICROGram(s) Oral daily  metroNIDAZOLE  IVPB 500 milliGRAM(s) IV Intermittent every 6 hours  heparin  Injectable 5000 Unit(s) SubCutaneous every 8 hours  lactobacillus acidophilus and bulgaricus Chewable 1 Tablet(s) Chew daily  cefTRIAXone   IVPB 2 Gram(s) IV Intermittent every 12 hours  vancomycin  IVPB 1250 milliGRAM(s) IV Intermittent every 8 hours  sodium chloride 0.65% Nasal 1 Spray(s) Both Nostrils four times a day  mupirocin 2% Ointment 1 Application(s) Topical four times a day  sodium chloride 0.9% lock flush 20 milliLiter(s) IV Push once  valACYclovir 1000 milliGRAM(s) Oral two times a day    MEDICATIONS  (PRN):  acetaminophen   Tablet 325 milliGRAM(s) Oral every 4 hours PRN mild pain or fever >101  ibuprofen  Tablet 600 milliGRAM(s) Oral every 6 hours PRN headache  sodium chloride 0.9% lock flush 10 milliLiter(s) IV Push every 1 hour PRN After each medication administration  sodium chloride 0.9% lock flush 10 milliLiter(s) IV Push every 12 hours PRN Lumen of catheter NOT used  promethazine IVPB 25 milliGRAM(s) IV Intermittent every 6 hours PRN nausea      Allergies    penicillin (Unknown)  sulfa drugs (Unknown)    Intolerances        LABS:                        12.2   9.2   )-----------( 271      ( 2017 07:47 )             37.6     07-25    133<L>  |  94<L>  |  6<L>  ----------------------------<  110<H>  3.0<L>   |  22  |  0.50    Ca    8.9      2017 07:01  Phos  3.4       Mg     1.8             Urinalysis Basic - ( 2017 22:27 )    Color: Yellow / Appearance: Clear / S.015 / pH: x  Gluc: x / Ketone: 15 mg/dL  / Bili: NEGATIVE / Urobili: 0.2 E.U./dL   Blood: x / Protein: NEGATIVE mg/dL / Nitrite: NEGATIVE   Leuk Esterase: NEGATIVE / RBC: x / WBC x   Sq Epi: x / Non Sq Epi: x / Bacteria: x        RADIOLOGY & ADDITIONAL TESTS:    Culture - Blood (17 @ 21:10)    Specimen Source: .Blood Blood-Venous    Culture Results:   No growth at 1 day.    Culture - Blood (17 @ 13:50)    Specimen Source: .Blood None    Culture Results:   No growth at 5 days.    EXAM:  MR BRAIN WO - W CONTRAST                          PROCEDURE DATE:  2017    Quantity of Contrast in Vial in ml: 7.5 Contrast Used: Gadovist  Quantity of Contrast Wasted in ml: 0    IMPRESSION:     MRI brain/temporal bones demonstrates diffuse right pachymeningitis,   small (4 mm right subdural collection with frontal empyema, and right   frontoparietal leptomeningitis. No evidence of cerebritis or brain   abscess at this time. Intracranial gas appears to be resolving versus   resolved.    Thin section imaging shows no evidence of right temporal cephalocele into   the right tympanomastoid cavity at this time. Findings of right   otomastoiditis and diffuse sinusitis, as above.

## 2017-07-26 NOTE — CONSULT NOTE ADULT - SUBJECTIVE AND OBJECTIVE BOX
Vascular Access Service Consult Note    66yFemaleHEALTH ISSUES - PROBLEM Dx:  Pneumocephalus: Pneumocephalus             Diagnosis:    Indications for Vascular Access (Check all that apply)  [  ]  Antibiotic Therapy       Antibiotic Prescribed:                                                                             Expected Duration of Therapy:               [  ]  IV Hydration  [  ]  Total Parenteral Nutrition  [  ]  Chemotherapy  [  ]  Difficult Venous Access  [  ]  CVP monitoring  [  ]  Medications with high potential for tissue necrosis on extravasation  [  ]  Other    Screening (Check all that apply)  Previous Radiation to chest  [  ] Yes      [  ]  No  Breast Cancer                          [  ] Left     [  ]  Right    [  ]  No  Lymph Node Dissection         [  ] Left     [  ]  Right    [  ]  No  Pacemaker or ICD                   [  ] Left     [  ]  Right    [  ]  No  Upper Extremity DVT             [  ] Left     [  ]  Right    [  ]  No  Chronic Kidney Disease         [  ]  Yes     [  ]  No  Hemodialysis                           [  ]  Yes     [  ]  No  AV Fistula/ Graft                     [  ]  Left    [  ]  Right    [  ]  No  Temp>101F in past 24 H       [  ]  Yes     [  ]  No  H/O PICC/Midline                   [  ]  Yes     [  ]  No    Lab data:                        12.2   9.2   )-----------( 271      ( 26 Jul 2017 07:47 )             37.6     07-25    133<L>  |  94<L>  |  6<L>  ----------------------------<  110<H>  3.0<L>   |  22  |  0.50    Ca    8.9      25 Jul 2017 07:01  Phos  3.4     07-25  Mg     1.8     07-25                I have reviewed the chart, interviewed and examined the patient and determined that this patient:  [  ] Is a candidate for a PICC line  [  ] Is a candidate for a Midline  [  ] Is not a candidate for vascular access device (reason)    Lumens:    [  ] Single  [  ] Double Vascular Access Service Consult Note    66yFemaleHEALTH ISSUES - PROBLEM Dx:  Pneumocephalus: Pneumocephalus             Diagnosis: Strep pneumocephalus/ Meningitis     Indications for Vascular Access (Check all that apply)  [ x ]  Antibiotic Therapy       Antibiotic Prescribed: Ceftriaxone, Vancomycin and Flagyl                                                                             Expected Duration of Therapy:               [  ]  IV Hydration  [  ]  Total Parenteral Nutrition  [  ]  Chemotherapy  [  ]  Difficult Venous Access  [  ]  CVP monitoring  [  ]  Medications with high potential for tissue necrosis on extravasation  [  ]  Other    Screening (Check all that apply)  Previous Radiation to chest  [  ] Yes      [ x ]  No  Breast Cancer                          [  ] Left     [  ]  Right    [ x ]  No  Lymph Node Dissection         [  ] Left     [  ]  Right    [x  ]  No  Pacemaker or ICD                   [  ] Left     [  ]  Right    [ x ]  No  Upper Extremity DVT             [  ] Left     [  ]  Right    [x  ]  No  Chronic Kidney Disease         [  ]  Yes     [x  ]  No  Hemodialysis                           [  ]  Yes     [ x ]  No  AV Fistula/ Graft                     [  ]  Left    [  ]  Right    [x  ]  No  Temp>101F in past 24 H       [  ]  Yes     [ x ]  No  H/O PICC/Midline                   [  ]  Yes     [ x ]  No    Lab data:                        12.2   9.2   )-----------( 271      ( 26 Jul 2017 07:47 )             37.6     07-25    133<L>  |  94<L>  |  6<L>  ----------------------------<  110<H>  3.0<L>   |  22  |  0.50    Ca    8.9      25 Jul 2017 07:01  Phos  3.4     07-25  Mg     1.8     07-25                I have reviewed the chart, interviewed and examined the patient and determined that this patient:  [ x ] Is a candidate for a PICC line  [  ] Is a candidate for a Midline  [  ] Is not a candidate for vascular access device (reason)    Lumens:    [  ] Single  [ x ] Double      Ericka Nguyen PA-C

## 2017-07-26 NOTE — PROCEDURE NOTE - NSPROCDETAILS_GEN_ALL_CORE
location identified, draped/prepped, sterile technique used/sterile technique, catheter placed/sterile dressing applied/ultrasound assessment/supine position/ultrasound guidance

## 2017-07-26 NOTE — PROGRESS NOTE ADULT - ASSESSMENT
66F with PMHx of hypothyroidism presented to [facility] on 7/20 at the request of urgent care center for evaluation of fever, earache and sinus pain x2d, found to have R mastoiditis,  pansinusitis and pneumocephalus  Pneumocephalus and inadvertent bacterial invasion into CNS from mastoiditis and small subdural collection right temporal lobe    Pneumococcal bacteremia right otitis. MRI w/ resolved gas in brain, diffuse R pachymeningitis and frontoleptomeningitis, R ostomastoiditis w/ diffuse sinusitis and R subdural empyema.     RECOMMEND  -c/w vanc 1250mg q8 (7/21), flagyl 500mg IV q6 (7/21-), and CTX 2g q12 (7/24-)  -Duration of abx tx will likely requires several weeks - due to bone involvement  - Will need repeat imaging (in 2wks?) in order to ascertain appropriate progress/response to treatment  -f/u S. pneumo FQ sensitivities 66F with PMHx of hypothyroidism presented to [facility] on 7/20 at the request of urgent care center for evaluation of fever, earache and sinus pain x2d, found to have R mastoiditis,  pansinusitis, pneumocephalus, and inadvertent bacterial invasion into CNS from mastoiditis and small subdural collection right temporal lobe      #Pneumococcal bacteremia right otitis. MRI w/ resolved gas in brain, diffuse R pachymeningitis and frontoleptomeningitis, R ostomastoiditis w/ diffuse sinusitis and R subdural empyema.   -will possibly transition to unasyn from flagyl and CTX, but for now c/w vanc 1250mg q8 (7/21), flagyl 500mg IV q6 (7/21-), and CTX 2g q12 (7/24-)  -Duration of abx tx will likely requires several weeks - due to bone involvement  -Will need repeat imaging (in 2wks?) in order to ascertain appropriate progress/response to treatment  -f/u S. pneumo FQ sensitivities    #Facial rash: patient w/ vesicular rash on the R nostril; liekly herpes infection  -starting valtrex 1g bid

## 2017-07-27 ENCOUNTER — TRANSCRIPTION ENCOUNTER (OUTPATIENT)
Age: 67
End: 2017-07-27

## 2017-07-27 PROBLEM — Z00.00 ENCOUNTER FOR PREVENTIVE HEALTH EXAMINATION: Status: ACTIVE | Noted: 2017-07-27

## 2017-07-27 LAB
ANION GAP SERPL CALC-SCNC: 11 MMOL/L — SIGNIFICANT CHANGE UP (ref 5–17)
BUN SERPL-MCNC: 6 MG/DL — LOW (ref 7–23)
CALCIUM SERPL-MCNC: 9.2 MG/DL — SIGNIFICANT CHANGE UP (ref 8.4–10.5)
CHLORIDE SERPL-SCNC: 101 MMOL/L — SIGNIFICANT CHANGE UP (ref 96–108)
CO2 SERPL-SCNC: 26 MMOL/L — SIGNIFICANT CHANGE UP (ref 22–31)
CREAT SERPL-MCNC: 0.6 MG/DL — SIGNIFICANT CHANGE UP (ref 0.5–1.3)
GLUCOSE SERPL-MCNC: 103 MG/DL — HIGH (ref 70–99)
HCT VFR BLD CALC: 36.2 % — SIGNIFICANT CHANGE UP (ref 34.5–45)
HGB BLD-MCNC: 12 G/DL — SIGNIFICANT CHANGE UP (ref 11.5–15.5)
MCHC RBC-ENTMCNC: 27.1 PG — SIGNIFICANT CHANGE UP (ref 27–34)
MCHC RBC-ENTMCNC: 33.1 G/DL — SIGNIFICANT CHANGE UP (ref 32–36)
MCV RBC AUTO: 81.7 FL — SIGNIFICANT CHANGE UP (ref 80–100)
PLATELET # BLD AUTO: 259 K/UL — SIGNIFICANT CHANGE UP (ref 150–400)
POTASSIUM SERPL-MCNC: 3.6 MMOL/L — SIGNIFICANT CHANGE UP (ref 3.5–5.3)
POTASSIUM SERPL-SCNC: 3.6 MMOL/L — SIGNIFICANT CHANGE UP (ref 3.5–5.3)
RBC # BLD: 4.43 M/UL — SIGNIFICANT CHANGE UP (ref 3.8–5.2)
RBC # FLD: 13.4 % — SIGNIFICANT CHANGE UP (ref 10.3–16.9)
SODIUM SERPL-SCNC: 138 MMOL/L — SIGNIFICANT CHANGE UP (ref 135–145)
VANCOMYCIN TROUGH SERPL-MCNC: 22.2 UG/ML — HIGH (ref 10–20)
WBC # BLD: 8.1 K/UL — SIGNIFICANT CHANGE UP (ref 3.8–10.5)
WBC # FLD AUTO: 8.1 K/UL — SIGNIFICANT CHANGE UP (ref 3.8–10.5)

## 2017-07-27 PROCEDURE — 99231 SBSQ HOSP IP/OBS SF/LOW 25: CPT

## 2017-07-27 RX ORDER — POTASSIUM CHLORIDE 20 MEQ
10 PACKET (EA) ORAL ONCE
Qty: 0 | Refills: 0 | Status: DISCONTINUED | OUTPATIENT
Start: 2017-07-27 | End: 2017-07-27

## 2017-07-27 RX ORDER — METRONIDAZOLE 500 MG
500 TABLET ORAL EVERY 8 HOURS
Qty: 0 | Refills: 0 | Status: DISCONTINUED | OUTPATIENT
Start: 2017-07-27 | End: 2017-07-28

## 2017-07-27 RX ORDER — POTASSIUM CHLORIDE 20 MEQ
10 PACKET (EA) ORAL ONCE
Qty: 0 | Refills: 0 | Status: COMPLETED | OUTPATIENT
Start: 2017-07-27 | End: 2017-07-27

## 2017-07-27 RX ORDER — VANCOMYCIN HCL 1 G
1250 VIAL (EA) INTRAVENOUS EVERY 12 HOURS
Qty: 0 | Refills: 0 | Status: DISCONTINUED | OUTPATIENT
Start: 2017-07-27 | End: 2017-07-28

## 2017-07-27 RX ADMIN — Medication 100 MILLIGRAM(S): at 16:20

## 2017-07-27 RX ADMIN — Medication 1 SPRAY(S): at 00:06

## 2017-07-27 RX ADMIN — MUPIROCIN 1 APPLICATION(S): 20 OINTMENT TOPICAL at 00:06

## 2017-07-27 RX ADMIN — Medication 150 MICROGRAM(S): at 07:09

## 2017-07-27 RX ADMIN — MUPIROCIN 1 APPLICATION(S): 20 OINTMENT TOPICAL at 18:44

## 2017-07-27 RX ADMIN — Medication 100 MILLIGRAM(S): at 04:38

## 2017-07-27 RX ADMIN — CEFTRIAXONE 100 GRAM(S): 500 INJECTION, POWDER, FOR SOLUTION INTRAMUSCULAR; INTRAVENOUS at 18:44

## 2017-07-27 RX ADMIN — Medication 125 MILLIGRAM(S): at 06:55

## 2017-07-27 RX ADMIN — Medication 1 SPRAY(S): at 07:10

## 2017-07-27 RX ADMIN — MUPIROCIN 1 APPLICATION(S): 20 OINTMENT TOPICAL at 07:10

## 2017-07-27 RX ADMIN — ONDANSETRON 4 MILLIGRAM(S): 8 TABLET, FILM COATED ORAL at 14:06

## 2017-07-27 RX ADMIN — VALACYCLOVIR 1000 MILLIGRAM(S): 500 TABLET, FILM COATED ORAL at 07:09

## 2017-07-27 RX ADMIN — Medication 166.67 MILLIGRAM(S): at 21:32

## 2017-07-27 RX ADMIN — HEPARIN SODIUM 5000 UNIT(S): 5000 INJECTION INTRAVENOUS; SUBCUTANEOUS at 06:55

## 2017-07-27 RX ADMIN — Medication 1 SPRAY(S): at 13:25

## 2017-07-27 RX ADMIN — Medication 500 MILLIGRAM(S): at 21:32

## 2017-07-27 RX ADMIN — VALACYCLOVIR 1000 MILLIGRAM(S): 500 TABLET, FILM COATED ORAL at 18:44

## 2017-07-27 RX ADMIN — HEPARIN SODIUM 5000 UNIT(S): 5000 INJECTION INTRAVENOUS; SUBCUTANEOUS at 21:33

## 2017-07-27 RX ADMIN — CEFTRIAXONE 100 GRAM(S): 500 INJECTION, POWDER, FOR SOLUTION INTRAMUSCULAR; INTRAVENOUS at 06:55

## 2017-07-27 RX ADMIN — MUPIROCIN 1 APPLICATION(S): 20 OINTMENT TOPICAL at 13:25

## 2017-07-27 RX ADMIN — Medication 1 SPRAY(S): at 18:44

## 2017-07-27 RX ADMIN — Medication 100 MILLIEQUIVALENT(S): at 09:18

## 2017-07-27 RX ADMIN — LACTOBACILLUS ACIDOPH-L.BULGARICUS 1 MILLION CELL CHEWABLE TABLET 1 TABLET(S): at 18:44

## 2017-07-27 RX ADMIN — HEPARIN SODIUM 5000 UNIT(S): 5000 INJECTION INTRAVENOUS; SUBCUTANEOUS at 14:01

## 2017-07-27 RX ADMIN — Medication 100 MILLIGRAM(S): at 09:18

## 2017-07-27 NOTE — DISCHARGE NOTE ADULT - PATIENT PORTAL LINK FT
“You can access the [employer] Patient Portal, offered by [facility], by registering with the following website: [email or web address]”

## 2017-07-27 NOTE — DISCHARGE NOTE ADULT - NSTOBACCOWEBSITE_GEN_A_NCS
HISTORY OF PRESENT ILLNESS  Saman Quintanilla is a 6 m.o. male. HPI  Pt presents with mother with \"cough and fever\"    Woke up this morning with cough, and mother states that she believes that it scared her at first, because he sounded very congested. Mother states that since he has been up a few hours, he sounds a lot less congested, and she believes his symptoms were probably just worse after laying down and being asleep. He did have temp of 99.1, at home, this morning. He has been acting like his usual self, still very active  No decrease in food intake, no decrease in wet diapers. OTC: none  Review of Systems   Constitutional: Positive for fever. HENT: Positive for congestion. Respiratory: Positive for cough. Gastrointestinal: Negative for diarrhea and vomiting. Physical Exam   Constitutional: He appears well-developed and well-nourished. He is active. HENT:   Head: Normocephalic and atraumatic. Anterior fontanelle is flat. Right Ear: Tympanic membrane, external ear, pinna and canal normal.   Left Ear: Tympanic membrane, external ear, pinna and canal normal.   Nose: Congestion present. Mouth/Throat: Mucous membranes are moist. Dentition is normal. Oropharynx is clear. Neck: Normal range of motion. Neck supple. Cardiovascular: Normal rate and regular rhythm. Pulmonary/Chest: Effort normal and breath sounds normal. He has no decreased breath sounds. He has no wheezes. He has no rhonchi. He has no rales. Lymphadenopathy: No occipital adenopathy is present. He has no cervical adenopathy. Neurological: He is alert. Skin: Skin is warm and dry. Capillary refill takes less than 3 seconds. Turgor is turgor normal.       ASSESSMENT and PLAN    ICD-10-CM ICD-9-CM    1. Viral upper respiratory tract infection J06.9 465.9     B97.89     2.  Fever, unspecified fever cause R50.9 780.60 AMB POC RAPID INFLUENZA TEST      POC RESPIRATORY SYNCYTIAL VIRUS     Informed mother that I believe that symptoms are viral, and will improve with time. Educated about supportive measures. Educated about monitoring fever, and treating as needed. Educated about always returning patient to the office with continued, or worsening symptoms. Mother informed to return to office with worsening of symptoms, or PRN with any questions or concerns. Mother verbalizes understanding of plan of care and denies further questions or concerns at this time. [place] website --- [email or web address] --- [email or web address]

## 2017-07-27 NOTE — DISCHARGE NOTE ADULT - CARE PLAN
Principal Discharge DX:	Meningitis  Goal:	Improvement Principal Discharge DX:	Meningitis  Goal:	Improvement  Instructions for follow-up, activity and diet:	-Resume regular diet as tolerated  -Resume regular activity as tolerated  -Follow-up with Dr. Schneider (Infectious disease on 8/08/17 @11AM)  -Call the office to schedule a follow-up appointment with Dr. Rey for 1-2 weeks

## 2017-07-27 NOTE — DISCHARGE NOTE ADULT - HOSPITAL COURSE
ENT DISCHARGE SUMMARY    Date of admission: 7/20/17  Date of discharge:  Diagnosis: Meningitis/pneumocephalus   Consults: ID, ROSEMARY    HPI: 66F transfer from [facility] after ENT was consulted for R otalgia and temporal and frontal fossa pneumocephalus on CT max-fac. Reports that she had been suffering from seasonal allergies for 2 weeks and using a neti-pot and saline spray to rinse her sinus. However, 2 days ago, she suddenly developed moderate/severe otalgia and headache on the right. Endorses excessive blowing of nose during this period. Denies trauma. Pt had been seen at urgent care, but no instrumentation of her ear or sinuses.     7/21: Appreciated neurosurgical and ID input. Started on IV abx as per ID. Not for surgical intervention at this point. Monitoring for signs of meningism.  7/22:  Patient febrile to 103, mild tachycardia to 108 oVSS overnight. Pancultured. Vanc trough subtherapeutic at 10.8 so increased to 1.25 q8h. Continues on flagyl/cefepime. Nausea, no emesis, improved with phenergan. Improvement in headache. No meningeal signs. Denies photophobia.   7/23: ALANIS overnight. Febrile to 101x1 at 2PM but otherwise AF. tachycardia improved. HR 86-94. Mild headache improved with motrin. Feeling better. Nausea improved. VT 17.7.  7/24: ALANIS overnight. AFVSS. Attempted MRI however patient refused 2/2 nausea and anxiety. Still with nausea associated with vanc tx slightly improved with prn anti-nausea. No meningeal signs. Cefepime transitioned to ceftriaxone.   7/25: Had MRI brain/orbits/face yesterday. C/f meningitis and possible frontal empyema. Seen by NSGY who rec no surgical intervention. Became acutely delirious overnight thought to be 2/2 to ativan required for MRI, placed on 1:1 but back to baseline mental status and appropriate this AM. Tm102 at 5PM otherwise AFVSS. Pancultured. Continues on broad spectrum abx.   7/26: Pt stable. Feels ok. Delirium resolved. no acute events. Persistent R serous SUSAN on exam. TFTs normal.   7/27: PICC placed yesterday for long-term IV abx. ALANIS overnight. AFVSS. Feels okay, still with intermittent nausea. No meningeal signs. No headache. Started on valtrex PO for possible nasal herpetic rash per ID.    Vital Signs Last 24 Hrs  T(C): 37.4 (27 Jul 2017 05:35), Max: 38 (26 Jul 2017 22:38)  T(F): 99.3 (27 Jul 2017 05:35), Max: 100.4 (26 Jul 2017 22:38)  HR: 84 (27 Jul 2017 08:44) (80 - 90)  BP: 132/67 (27 Jul 2017 08:44) (122/65 - 132/67)  BP(mean): 93 (27 Jul 2017 08:44) (89 - 93)  RR: 16 (27 Jul 2017 08:44) (16 - 18)  SpO2: 95% (27 Jul 2017 08:44) (95% - 97%)      PE:  NAD, conversant, respond appropriately, following commands  NC/AT, EOMI, face grossly symmetric  no neck pain or meningismus, no nuchal rigidity   small cut to right nasal ala with surrounding erythema and swelling slightly improved from prior with yellowish crusting, no fluctuance or collection   vipin MARTINEZ     BCx (7/20): +Strep pneumo, pansensitive   (7/21): negative  (7/24): NGTD  Nasal cx (7/21):coag neg Staph, clinda/PCN resistant     Imaging:   CT head d/w Dr Stoddard  Thin skullbase over bilateral mastoid and sphenoid,   Extradural and intradural air in left temporal and frontal region    MRI 7/24:   MRI brain/temporal bones demonstrates diffuse right pachymeningitis,   small (4 mm right subdural collection with frontal empyema, and right   frontoparietal leptomeningitis. No evidence of cerebritis or brain   abscess at this time. Intracranial gas appears to be resolving versus   resolved.  Thin section imaging shows no evidence of right temporal cephalocele into   the right tympanomastoid cavity at this time. Findings of right   otomastoiditis and diffuse sinusitis, as above.    Assessment/Plan:    67yo w pneumocephalus likely early OM w small effusion and Strep pneumo bacteremia and septicemia at OSH with ID following. Repeat cultures negative so far. MRI w/ e/o meningitis and 4mm empyema. Continues on broad spectrum abx.      - f/u repeat BCx 7/24  - ID following (Dr Cordon), appreciate recs            -Ceftriaxone 2g q12            -Flagyl 500mg q6           -Vanco 1.25g q8           -Valtrex PO           -repeat cx q48hrs if febrile             s/p cefepime 7/20-24  -NSGY following - no acute surgical intervention, appreciate ID input  -phenergan/zofran prn nausea   -prn pain  -Regular diet as tolerated   -bactroban to R naris  -probiotic   -Continue home synthroid   -DVT ppx: SCDs, SQH, ambulate  -[facility] at [phone] with any questions/concerns.    Discharge Instructions:  PICC Line Instructions:   DO NOT USE PICC line arm for needle sticks or blood pressure  Notify provider for bleeding that soaks through the dressing at the catheter site, PICC line redness, tenderness, or swelling.  Notify provider for any line occlusion, lack of blood return or migration of the catheter more than one inch.  Change dressing using sterile technique every 7 days and as needed, both patient and RN should wear mask for dressing change, RN should wear sterile gloves  Flush all lumens with 10 cc normal saline before and after use of each port. Flush port q 12 hrs.      Discharge Medications:  -Ceftriaxone 2gm IV vq12h via PICC   -Valtrex PO   -Zofran PO prn nausea    Follow-Up:   -Follow-up with Dr. Schneider (Infectious Disease) on 8/11/17 at 10:00AM  -Call the office to schedule a follow-up appointment with Dr. Rey in ENT DISCHARGE SUMMARY    Date of admission: 7/20/17  Date of discharge:   Diagnosis: Meningitis/pneumocephalus   Consults: ID, ROSEMARY    HPI: 66F transfer from [facility] after ENT was consulted for R otalgia and temporal and frontal fossa pneumocephalus on CT max-fac. Reports that she had been suffering from seasonal allergies for 2 weeks and using a neti-pot and saline spray to rinse her sinus. However, 2 days ago, she suddenly developed moderate/severe otalgia and headache on the right. Endorses excessive blowing of nose during this period. Denies trauma. Pt had been seen at urgent care, but no instrumentation of her ear or sinuses.     7/21: Appreciated neurosurgical and ID input. Started on IV abx as per ID. Not for surgical intervention at this point. Monitoring for signs of meningism.  7/22:  Patient febrile to 103, mild tachycardia to 108 oVSS overnight. Pancultured. Vanc trough subtherapeutic at 10.8 so increased to 1.25 q8h. Continues on flagyl/cefepime. Nausea, no emesis, improved with phenergan. Improvement in headache. No meningeal signs. Denies photophobia.   7/23: ALANIS overnight. Febrile to 101x1 at 2PM but otherwise AF. tachycardia improved. HR 86-94. Mild headache improved with motrin. Feeling better. Nausea improved. VT 17.7.  7/24: ALANIS overnight. AFVSS. Attempted MRI however patient refused 2/2 nausea and anxiety. Still with nausea associated with vanc tx slightly improved with prn anti-nausea. No meningeal signs. Cefepime transitioned to ceftriaxone.   7/25: Had MRI brain/orbits/face yesterday. C/f meningitis and possible frontal empyema. Seen by NSGY who rec no surgical intervention. Became acutely delirious overnight thought to be 2/2 to ativan required for MRI, placed on 1:1 but back to baseline mental status and appropriate this AM. Tm102 at 5PM otherwise AFVSS. Pancultured. Continues on broad spectrum abx.   7/26: Pt stable. Feels ok. Delirium resolved. no acute events. Persistent R serous SUSAN on exam. TFTs normal.   7/27: PICC placed yesterday for long-term IV abx. ALANIS overnight. AFVSS. Feels okay, still with intermittent nausea. No meningeal signs. No headache. Started on valtrex PO for possible nasal herpetic rash per ID.  7/28: ALANIS. AFVSS. Transitioned to flagyl PO q8h. Vanc trough supratherapeutic at 22.2, transitioned to 1250mg q12h. Doing well. No new complaints. Dispo planning ongoing. medically stable for d/c     *vitals    PE:  NAD, conversant, respond appropriately, following commands  NC/AT, EOMI, face grossly symmetric  no neck pain or meningismus, no nuchal rigidity   small cut to right nasal ala with surrounding erythema and swelling slightly improved from prior with yellowish crusting, no fluctuance or collection   MARTINEZ, wwp     BCx (7/20): +Strep pneumo, pansensitive   (7/21): negative  (7/24): NGTD  Nasal cx (7/21):coag neg Staph, clinda/PCN resistant     Imaging:   CT head d/w Dr Stoddard  Thin skullbase over bilateral mastoid and sphenoid,   Extradural and intradural air in left temporal and frontal region    MRI 7/24:   MRI brain/temporal bones demonstrates diffuse right pachymeningitis,   small (4 mm right subdural collection with frontal empyema, and right   frontoparietal leptomeningitis. No evidence of cerebritis or brain   abscess at this time. Intracranial gas appears to be resolving versus   resolved.  Thin section imaging shows no evidence of right temporal cephalocele into   the right tympanomastoid cavity at this time. Findings of right   otomastoiditis and diffuse sinusitis, as above.    Assessment/Plan:    65yo w pneumocephalus likely early OM w small effusion and Strep pneumo bacteremia and septicemia at OSH with ID following. Repeat cultures negative so far. MRI w/ e/o meningitis and 4mm empyema. Continues on broad spectrum abx.      - f/u repeat BCx 7/24  - ID following (Dr Cordon), appreciate recs            -Ceftriaxone 2g q12            -Flagyl 500mg q8 PO           -Vanco 1.25g q8           -Valtrex PO           -repeat cx q48hrs if febrile             s/p cefepime 7/20-24            s/p flagyl IV 7/20-27  -NSGY consulted - no acute intervention   -phenergan/zofran prn nausea   -prn pain  -Regular diet as tolerated   -bactroban to R naris  -probiotic   -Continue home synthroid   -DVT ppx: SCDs, SQH, ambulate  -[facility] at [phone] with any questions/concerns.    Discharge Instructions:  PICC Line Instructions:   DO NOT USE PICC line arm for needle sticks or blood pressure  Notify provider for bleeding that soaks through the dressing at the catheter site, PICC line redness, tenderness, or swelling.  Notify provider for any line occlusion, lack of blood return or migration of the catheter more than one inch.  Change dressing using sterile technique every 7 days and as needed, both patient and RN should wear mask for dressing change, RN should wear sterile gloves  Flush all lumens with 10 cc normal saline before and after use of each port. Flush port q 12 hrs.    Resume regular diet  Take all medications as prescribed    -Patient will require qweekly labs while on IV abx:  -CBC with differential  -CMP  -ESR/CRP  -vancomycin trough    Discharge Medications:  -Ceftriaxone 2gm IV vq12h x6 weeks (total from 7/20) via PICC   -Vancomycin 1250mg q12h x6 weeks (total from 7/20) via PICC  -Flagyl 500mg q8h PO x 6weeks (total from 7/20)   -Valtrex 1gm PO q12h   -Zofran 4mg q4h PO prn nausea  -Tylenol 650mg PO q6h prn pain  -Bactroban ointment topical to R naris TID     Follow-Up:   -Follow-up with Dr. Schneider (Infectious Disease) on 8/08/17 at 11:00AM  -Call the office to schedule a follow-up appointment with Dr. Rey in 1-2 weeks ENT DISCHARGE SUMMARY    Date of admission: 7/20/17  Date of discharge: 7/28/17  Diagnosis: Meningitis/pneumocephalus   Consults: ID, ROSEMARY    HPI: 66F transfer from [facility] after ENT was consulted for R otalgia and temporal and frontal fossa pneumocephalus on CT max-fac. Reports that she had been suffering from seasonal allergies for 2 weeks and using a neti-pot and saline spray to rinse her sinus. However, 2 days ago, she suddenly developed moderate/severe otalgia and headache on the right. Endorses excessive blowing of nose during this period. Denies trauma. Pt had been seen at urgent care, but no instrumentation of her ear or sinuses.     7/21: Appreciated neurosurgical and ID input. Started on IV abx as per ID. Not for surgical intervention at this point. Monitoring for signs of meningism.  7/22:  Patient febrile to 103, mild tachycardia to 108 oVSS overnight. Pancultured. Vanc trough subtherapeutic at 10.8 so increased to 1.25 q8h. Continues on flagyl/cefepime. Nausea, no emesis, improved with phenergan. Improvement in headache. No meningeal signs. Denies photophobia.   7/23: ALANIS overnight. Febrile to 101x1 at 2PM but otherwise AF. tachycardia improved. HR 86-94. Mild headache improved with motrin. Feeling better. Nausea improved. VT 17.7.  7/24: ALANIS overnight. AFVSS. Attempted MRI however patient refused 2/2 nausea and anxiety. Still with nausea associated with vanc tx slightly improved with prn anti-nausea. No meningeal signs. Cefepime transitioned to ceftriaxone.   7/25: Had MRI brain/orbits/face yesterday. C/f meningitis and possible frontal empyema. Seen by NSGY who rec no surgical intervention. Became acutely delirious overnight thought to be 2/2 to ativan required for MRI, placed on 1:1 but back to baseline mental status and appropriate this AM. Tm102 at 5PM otherwise AFVSS. Pancultured. Continues on broad spectrum abx.   7/26: Pt stable. Feels ok. Delirium resolved. no acute events. Persistent R serous SUSAN on exam. TFTs normal.   7/27: PICC placed yesterday for long-term IV abx. ALANIS overnight. AFVSS. Feels okay, still with intermittent nausea. No meningeal signs. No headache. Started on valtrex PO for possible nasal herpetic rash per ID.  7/28: ALANIS. AFVSS. Transitioned to flagyl PO q8h. Vanc trough supratherapeutic at 22.2, transitioned to 1250mg q12h. Doing well. No new complaints. Dispo planning ongoing. medically stable for d/c     PE:  NAD, conversant, respond appropriately, following commands  NC/AT, EOMI, face grossly symmetric  no neck pain or meningismus, no nuchal rigidity   small cut to right nasal ala with surrounding erythema and swelling slightly improved from prior with yellowish crusting, no fluctuance or collection   vipin MARTINEZ     BCx (7/20): +Strep pneumo, pansensitive   (7/21): negative  (7/24): NGTD  Nasal cx (7/21):coag neg Staph, clinda/PCN resistant     Imaging:   CT head d/w Dr Stoddard  Thin skullbase over bilateral mastoid and sphenoid,   Extradural and intradural air in left temporal and frontal region    MRI 7/24:   MRI brain/temporal bones demonstrates diffuse right pachymeningitis,   small (4 mm right subdural collection with frontal empyema, and right   frontoparietal leptomeningitis. No evidence of cerebritis or brain   abscess at this time. Intracranial gas appears to be resolving versus   resolved.  Thin section imaging shows no evidence of right temporal cephalocele into   the right tympanomastoid cavity at this time. Findings of right   otomastoiditis and diffuse sinusitis, as above.    Assessment/Plan:    67yo w pneumocephalus likely early OM w small effusion and Strep pneumo bacteremia and septicemia at OSH with ID following. Repeat cultures negative so far. MRI w/ e/o meningitis and 4mm empyema. Continues on broad spectrum abx.      - f/u repeat BCx 7/24  - ID following (Dr Cordon), appreciate recs            -Ceftriaxone 2g q12            -Flagyl 500mg q8 PO           -Vanco 1.25g q8           -Valtrex PO           -repeat cx q48hrs if febrile             s/p cefepime 7/20-24            s/p flagyl IV 7/20-27  -NSGY consulted - no acute intervention   -phenergan/zofran prn nausea   -prn pain  -Regular diet as tolerated   -bactroban to R naris  -probiotic   -Continue home synthroid   -DVT ppx: SCDs, SQH, ambulate  -[facility] at [phone] with any questions/concerns.    Discharge Instructions:  PICC Line Instructions:   DO NOT USE PICC line arm for needle sticks or blood pressure  Notify provider for bleeding that soaks through the dressing at the catheter site, PICC line redness, tenderness, or swelling.  Notify provider for any line occlusion, lack of blood return or migration of the catheter more than one inch.  Change dressing using sterile technique every 7 days and as needed, both patient and RN should wear mask for dressing change, RN should wear sterile gloves  Flush all lumens with 10 cc normal saline before and after use of each port. Flush port q 12 hrs.  Resume regular diet  Take all medications as prescribed  -Patient will require qweekly labs while on IV abx:  -CBC with differential  -CMP  -ESR/CRP  -vancomycin trough  Discharge Medications:  -Ceftriaxone 2gm IV vq12h x6 weeks (total from 7/20) via PICC   -Vancomycin 1250mg q12h x6 weeks (total from 7/20) via PICC  -Flagyl 500mg q8h PO x 6weeks (total from 7/20)   -Valtrex 1gm PO q12h x2 weeks  -Zofran 4mg q4h PO prn nausea  -Tylenol 650mg PO q6h prn pain  -Motrin 600mg PO q6h prn headache   -Bactroban ointment topical to R naris TID     Follow-Up:   -Follow-up with Dr. Schneider (Infectious Disease) on 8/08/17 at 11:00AM  -Call the office to schedule a follow-up appointment with Dr. Rey in 1-2 weeks ENT DISCHARGE SUMMARY    Date of admission: 7/20/17  Date of discharge: 7/28/17  Diagnosis: Meningitis/pneumocephalus   Consults: ID, ROSEMARY    HPI: 66F transfer from [facility] after ENT was consulted for R otalgia and temporal and frontal fossa pneumocephalus on CT max-fac. Reports that she had been suffering from seasonal allergies for 2 weeks and using a neti-pot and saline spray to rinse her sinus. However, 2 days ago, she suddenly developed moderate/severe otalgia and headache on the right. Endorses excessive blowing of nose during this period. Denies trauma. Pt had been seen at urgent care, but no instrumentation of her ear or sinuses.     7/21: Appreciated neurosurgical and ID input. Started on IV abx as per ID. Not for surgical intervention at this point. Monitoring for signs of meningism.  7/22:  Patient febrile to 103, mild tachycardia to 108 oVSS overnight. Pancultured. Vanc trough subtherapeutic at 10.8 so increased to 1.25 q8h. Continues on flagyl/cefepime. Nausea, no emesis, improved with phenergan. Improvement in headache. No meningeal signs. Denies photophobia.   7/23: ALANIS overnight. Febrile to 101x1 at 2PM but otherwise AF. tachycardia improved. HR 86-94. Mild headache improved with motrin. Feeling better. Nausea improved. VT 17.7.  7/24: ALANIS overnight. AFVSS. Attempted MRI however patient refused 2/2 nausea and anxiety. Still with nausea associated with vanc tx slightly improved with prn anti-nausea. No meningeal signs. Cefepime transitioned to ceftriaxone.   7/25: Had MRI brain/orbits/face yesterday. C/f meningitis and possible frontal empyema. Seen by NSGY who rec no surgical intervention. Became acutely delirious overnight thought to be 2/2 to ativan required for MRI, placed on 1:1 but back to baseline mental status and appropriate this AM. Tm102 at 5PM otherwise AFVSS. Pancultured. Continues on broad spectrum abx.   7/26: Pt stable. Feels ok. Delirium resolved. no acute events. Persistent R serous SUSAN on exam. TFTs normal.   7/27: PICC placed yesterday for long-term IV abx. ALANIS overnight. AFVSS. Feels okay, still with intermittent nausea. No meningeal signs. No headache. Started on valtrex PO for possible nasal herpetic rash per ID.  7/28: ALANIS. AFVSS. Transitioned to flagyl PO q8h. Vanc trough supratherapeutic at 22.2, transitioned to 1250mg q12h. Doing well. No new complaints. Dispo planning ongoing. medically stable for d/c     PE:  NAD, conversant, respond appropriately, following commands  NC/AT, EOMI, face grossly symmetric  no neck pain or meningismus, no nuchal rigidity   small cut to right nasal ala with surrounding erythema and swelling slightly improved from prior with yellowish crusting, no fluctuance or collection   vipin MARTINEZ     BCx (7/20): +Strep pneumo, pansensitive   (7/21): negative  (7/24): NGTD  Nasal cx (7/21):coag neg Staph, clinda/PCN resistant     Imaging:   CT head d/w Dr Stoddard  Thin skullbase over bilateral mastoid and sphenoid,   Extradural and intradural air in left temporal and frontal region    MRI 7/24:   MRI brain/temporal bones demonstrates diffuse right pachymeningitis,   small (4 mm right subdural collection with frontal empyema, and right   frontoparietal leptomeningitis. No evidence of cerebritis or brain   abscess at this time. Intracranial gas appears to be resolving versus   resolved.  Thin section imaging shows no evidence of right temporal cephalocele into   the right tympanomastoid cavity at this time. Findings of right   otomastoiditis and diffuse sinusitis, as above.    Assessment/Plan:    65yo w pneumocephalus likely early OM w small effusion and Strep pneumo bacteremia and septicemia at OSH with ID following. Repeat cultures negative so far. MRI w/ e/o meningitis and 4mm empyema. Continues on broad spectrum abx.      - f/u repeat BCx 7/24  - ID following (Dr Cordon), appreciate recs            -Ceftriaxone 2g q12            -Flagyl 500mg q8 PO           -Vanco 1.25g q8           -Valtrex PO           -repeat cx q48hrs if febrile             s/p cefepime 7/20-24            s/p flagyl IV 7/20-27  -NSGY consulted - no acute intervention   -phenergan/zofran prn nausea   -prn pain  -Regular diet as tolerated   -bactroban to R naris  -probiotic   -Continue home synthroid   -DVT ppx: SCDs, SQH, ambulate  -[facility] at [phone] with any questions/concerns.    Discharge Instructions:  PICC Line Instructions:   DO NOT USE PICC line arm for needle sticks or blood pressure  Notify provider for bleeding that soaks through the dressing at the catheter site, PICC line redness, tenderness, or swelling.  Notify provider for any line occlusion, lack of blood return or migration of the catheter more than one inch.  Change dressing using sterile technique every 7 days and as needed, both patient and RN should wear mask for dressing change, RN should wear sterile gloves  Flush all lumens with 10 cc normal saline before and after use of each port. Flush port q 12 hrs.  Resume regular diet  Take all medications as prescribed  -Patient will require qweekly labs while on IV abx:  -CBC with differential  -CMP  -ESR/CRP  -vancomycin trough  -Patient will require repeat MRI in approximately 2 weeks for surveillance of meningitis   Discharge Medications:  -Ceftriaxone 2gm IV vq12h x6 weeks (total from 7/20) via PICC   -Vancomycin 1250mg q12h x6 weeks (total from 7/20) via PICC  -Flagyl 500mg q8h PO x 6weeks (total from 7/20)   -Valtrex 1gm PO q12h x2 weeks  -Zofran 4mg q4h PO prn nausea  -Tylenol 650mg PO q6h prn pain  -Motrin 600mg PO q6h prn headache   -Bactroban ointment topical to R naris TID     Follow-Up:   -Follow-up with Dr. Schneider (Infectious Disease) on 8/08/17 at 11:00AM  -Call the office to schedule a follow-up appointment with Dr. Rey in 1-2 weeks

## 2017-07-27 NOTE — DISCHARGE NOTE ADULT - PLAN OF CARE
Improvement -Resume regular diet as tolerated  -Resume regular activity as tolerated  -Follow-up with Dr. Schneider (Infectious disease on 8/08/17 @11AM)  -Call the office to schedule a follow-up appointment with Dr. Rey for 1-2 weeks

## 2017-07-27 NOTE — PROGRESS NOTE ADULT - SUBJECTIVE AND OBJECTIVE BOX
Subjective: Case reviewed.  No acute events, no new neurologic events.  Thyroid function tests normal    T(C): 37.4 (07-27-17 @ 05:35), Max: 38 (07-26-17 @ 22:38)  HR: 84 (07-27-17 @ 08:44) (80 - 90)  BP: 132/67 (07-27-17 @ 08:44) (122/65 - 132/67)  RR: 16 (07-27-17 @ 08:44) (16 - 18)  SpO2: 95% (07-27-17 @ 08:44) (95% - 97%)  Wt(kg): --    Exam: Alert/awake, oriented x 3, speech clear  MARTINEZ equally  Face symmetric    CBC Full  -  ( 27 Jul 2017 06:39 )  WBC Count : 8.1 K/uL  Hemoglobin : 12.0 g/dL  Hematocrit : 36.2 %  Platelet Count - Automated : 259 K/uL  Mean Cell Volume : 81.7 fL  Mean Cell Hemoglobin : 27.1 pg  Mean Cell Hemoglobin Concentration : 33.1 g/dL  Auto Neutrophil # : x  Auto Lymphocyte # : x  Auto Monocyte # : x  Auto Eosinophil # : x  Auto Basophil # : x  Auto Neutrophil % : x  Auto Lymphocyte % : x  Auto Monocyte % : x  Auto Eosinophil % : x  Auto Basophil % : x    07-27    138  |  101  |  6<L>  ----------------------------<  103<H>  3.6   |  26  |  0.60    Ca    9.2      27 Jul 2017 06:42        Assessment/Plan:  -Reviewed with Dr. Moreland, no neurosurgical intervention/recommendations at this time  -Continue plan per ID and primary team  -Reconsult as needed  -D/W Dr. Moreland

## 2017-07-27 NOTE — DISCHARGE NOTE ADULT - CARE PROVIDERS DIRECT ADDRESSES
[email or web address]

## 2017-07-27 NOTE — PROGRESS NOTE ADULT - SUBJECTIVE AND OBJECTIVE BOX
RADHA-HNS PROGRESS NOTE    HPI: 66F transfer from [facility] after ENT was consulted for R otalgia and temporal and frontal fossa pneumocephalus on CT max-fac. Reports that she had been suffering from seasonal allergies for 2 weeks and using a neti-pot and saline spray to rinse her sinus. However, 2 days ago, she suddenly developed moderate/severe otalgia and headache on the right. Endorses excessive blowing of nose during this period. Denies trauma. Pt had been seen at urgent care, but no instrumentation of her ear or sinuses.     7/21: Appreciated neurosurgical and ID input. Started on IV abx as per ID. Not for surgical intervention at this point. Monitoring for signs of meningism.  7/22:  Patient febrile to 103, mild tachycardia to 108 oVSS overnight. Pancultured. Vanc trough subtherapeutic at 10.8 so increased to 1.25 q8h. Continues on flagyl/cefepime. Nausea, no emesis, improved with phenergan. Improvement in headache. No meningeal signs. Denies photophobia.   7/23: ALANIS overnight. Febrile to 101x1 at 2PM but otherwise AF. tachycardia improved. HR 86-94. Mild headache improved with motrin. Feeling better. Nausea improved. VT 17.7.  7/24: ALANIS overnight. AFVSS. Attempted MRI however patient refused 2/2 nausea and anxiety. Still with nausea associated with vanc tx slightly improved with prn anti-nausea. No meningeal signs. Cefepime transitioned to ceftriaxone.   7/25: Had MRI brain/orbits/face yesterday. C/f meningitis and possible frontal empyema. Seen by NSGY who rec no surgical intervention. Became acutely delirious overnight thought to be 2/2 to ativan required for MRI, placed on 1:1 but back to baseline mental status and appropriate this AM. Tm102 at 5PM otherwise AFVSS. Pancultured. Continues on broad spectrum abx.   7/26: Pt stable. Feels ok. Delirium resolved. no acute events. Persistent R serous SUSAN on exam. TFTs normal.   7/27: PICC placed yesterday for long-term IV abx. ALANIS overnight. AFVSS. Feels okay, still with intermittent nausea. No meningeal signs. No headache. Started on valtrex PO for possible nasal herpetic rash per ID.    Vital Signs Last 24 Hrs  T(C): 37.4 (27 Jul 2017 05:35), Max: 38 (26 Jul 2017 22:38)  T(F): 99.3 (27 Jul 2017 05:35), Max: 100.4 (26 Jul 2017 22:38)  HR: 84 (27 Jul 2017 08:44) (80 - 90)  BP: 132/67 (27 Jul 2017 08:44) (122/65 - 132/67)  BP(mean): 93 (27 Jul 2017 08:44) (89 - 93)  RR: 16 (27 Jul 2017 08:44) (16 - 18)  SpO2: 95% (27 Jul 2017 08:44) (95% - 97%)      PE:  NAD, conversant, respond appropriately, following commands  NC/AT, EOMI, face grossly symmetric  no neck pain or meningismus, no nuchal rigidity   small cut to right nasal ala with surrounding erythema and swelling slightly improved from prior with yellowish crusting, no fluctuance or collection   vipin MARTINEZ     Labs:  07-27    138  |  101  |  6<L>  ----------------------------<  103<H>  3.6   |  26  |  0.60    Ca    9.2      27 Jul 2017 06:42    BCx (7/20): +Strep pneumo, pansensitive   (7/21): negative  (7/24): NGTD  Nasal cx (7/21):coag neg Staph, clinda/PCN resistant     Imaging:   CT head d/w Dr Stoddard  Thin skullbase over bilateral mastoid and sphenoid,   Extradural and intradural air in left temporal and frontal region    MRI 7/24:   MRI brain/temporal bones demonstrates diffuse right pachymeningitis,   small (4 mm right subdural collection with frontal empyema, and right   frontoparietal leptomeningitis. No evidence of cerebritis or brain   abscess at this time. Intracranial gas appears to be resolving versus   resolved.  Thin section imaging shows no evidence of right temporal cephalocele into   the right tympanomastoid cavity at this time. Findings of right   otomastoiditis and diffuse sinusitis, as above.    Assessment/Plan:    65yo w pneumocephalus likely early OM w small effusion and Strep pneumo bacteremia and septicemia at OSH with ID following. Repeat cultures negative so far. MRI w/ e/o meningitis and 4mm empyema. Continues on broad spectrum abx.      - f/u repeat BCx 7/24  - ID following (Dr Cordon), appreciate recs            -Ceftriaxone 2g q12            -Flagyl 500mg q6           -Vanco 1.25g q8           -Valtrex PO           -repeat cx q48hrs if febrile             s/p cefepime 7/20-24  -NSGY following - no acute surgical intervention, appreciate ID input  -phenergan/zofran prn nausea   -prn pain  -Regular diet as tolerated   -bactroban to R naris  -probiotic   -Continue home synthroid   -DVT ppx: SCDs, SQH, ambulate  -[facility] at [phone] with any questions/concerns.    Dispo: SDU, awaiting final abx recs per ID   -SW - PICC supplies for d/c     Seen with Chief. Discussed with attending who agrees with plan above.

## 2017-07-27 NOTE — PROGRESS NOTE ADULT - SUBJECTIVE AND OBJECTIVE BOX
INTERVAL HPI/OVERNIGHT EVENTS: Reports occasional nausea and R ear fullness; otherwise no complaints.     VITAL SIGNS:  T(F): 98.3 (07-27-17 @ 16:00)  HR: 86 (07-27-17 @ 16:00)  BP: 115/72 (07-27-17 @ 16:00)  RR: 16 (07-27-17 @ 16:00)  SpO2: 95% (07-27-17 @ 16:00)  Wt(kg): --    PHYSICAL EXAM:    Constitutional: NAD  Eyes: PERRL  Neck: supple  Respiratory: CTAB  Cardiovascular: RRR, no m/g/r  Gastrointestinal: soft, obese, NT, normoactive BS  Neurological: AAOx3    MEDICATIONS  (STANDING):  levothyroxine 150 MICROGram(s) Oral daily  heparin  Injectable 5000 Unit(s) SubCutaneous every 8 hours  lactobacillus acidophilus and bulgaricus Chewable 1 Tablet(s) Chew daily  cefTRIAXone   IVPB 2 Gram(s) IV Intermittent every 12 hours  sodium chloride 0.65% Nasal 1 Spray(s) Both Nostrils four times a day  mupirocin 2% Ointment 1 Application(s) Topical four times a day  sodium chloride 0.9% lock flush 20 milliLiter(s) IV Push once  valACYclovir 1000 milliGRAM(s) Oral two times a day  metroNIDAZOLE    Tablet 500 milliGRAM(s) Oral every 8 hours  vancomycin  IVPB 1250 milliGRAM(s) IV Intermittent every 12 hours    MEDICATIONS  (PRN):  acetaminophen   Tablet 325 milliGRAM(s) Oral every 4 hours PRN mild pain or fever >101  ibuprofen  Tablet 600 milliGRAM(s) Oral every 6 hours PRN headache  sodium chloride 0.9% lock flush 10 milliLiter(s) IV Push every 1 hour PRN After each medication administration  sodium chloride 0.9% lock flush 10 milliLiter(s) IV Push every 12 hours PRN Lumen of catheter NOT used  ondansetron Injectable 4 milliGRAM(s) IV Push every 8 hours PRN Nausea and/or Vomiting      Allergies    penicillin (Unknown)  sulfa drugs (Unknown)    Intolerances        LABS:                        12.0   8.1   )-----------( 259      ( 27 Jul 2017 06:39 )             36.2     07-27    138  |  101  |  6<L>  ----------------------------<  103<H>  3.6   |  26  |  0.60    Ca    9.2      27 Jul 2017 06:42            RADIOLOGY & ADDITIONAL TESTS:    Culture - Blood (07.24.17 @ 21:10)    Specimen Source: .Blood Blood-Venous    Culture Results:   No growth at 1 day.    Culture - Blood (07.21.17 @ 13:50)    Specimen Source: .Blood None    Culture Results:   No growth at 5 days.    EXAM:  MR BRAIN WO - W CONTRAST                          PROCEDURE DATE:  07/24/2017    Quantity of Contrast in Vial in ml: 7.5 Contrast Used: Gadovist  Quantity of Contrast Wasted in ml: 0    IMPRESSION:     MRI brain/temporal bones demonstrates diffuse right pachymeningitis,   small (4 mm right subdural collection with frontal empyema, and right   frontoparietal leptomeningitis. No evidence of cerebritis or brain   abscess at this time. Intracranial gas appears to be resolving versus   resolved.    Thin section imaging shows no evidence of right temporal cephalocele into   the right tympanomastoid cavity at this time. Findings of right   otomastoiditis and diffuse sinusitis, as above.

## 2017-07-27 NOTE — DISCHARGE NOTE ADULT - MEDICATION SUMMARY - MEDICATIONS TO TAKE
I will START or STAY ON the medications listed below when I get home from the hospital:    metroNIDAZOLE 500 mg oral tablet  -- 1 tab(s) by mouth every 8 hours  -- Indication: For Meningitis    acetaminophen 325 mg oral tablet  -- 1 tab(s) by mouth every 4 hours, As needed, mild pain or fever >101  -- Indication: For Pain    ibuprofen 600 mg oral tablet  -- 1 tab(s) by mouth every 6 hours, As needed, headache  -- Indication: For headache    valACYclovir 1 g oral tablet  -- 1 tab(s) by mouth 2 times a day  -- Indication: For nose cellulitis    cefTRIAXone  -- 2 g/kg intravenous 2 times a day  -- Indication: For Meningitis    mupirocin 2% topical ointment  -- 1 application on skin 4 times a day  -- Indication: For nasal cellulitis    vancomycin  -- 1250 milligram(s) intravenous 2 times a day  -- Indication: For Meningitis    lactobacillus acidophilus and bulgaricus oral tablet, chewable  -- 1 tab(s) by mouth once a day  -- Indication: For Probiotic    levothyroxine 150 mcg (0.15 mg) oral tablet  -- 1 tab(s) by mouth once a day  -- Indication: For hypothyroidism

## 2017-07-27 NOTE — DISCHARGE NOTE ADULT - CARE PROVIDER_API CALL
Deepak Rey), Otolaryngology  [address] [place] [address]  Phone: [phone]  Fax: [phone]    Katia Schneider), Infectious Disease; Internal Medicine  [address] [address]  Phone: [phone]  Fax: [phone]

## 2017-07-27 NOTE — PROGRESS NOTE ADULT - ASSESSMENT
66F with PMHx of hypothyroidism presented to [facility] on 7/20 at the request of urgent care center for evaluation of fever, earache and sinus pain x2d, found to have R mastoiditis,  pansinusitis, pneumocephalus, and inadvertent bacterial invasion into CNS from mastoiditis and small subdural collection right temporal lobe      #Pneumococcal bacteremia right otitis. MRI w/ resolved gas in brain, diffuse R pachymeningitis and frontoleptomeningitis, R ostomastoiditis w/ diffuse sinusitis and R subdural empyema.   -will possibly transition to unasyn from flagyl and CTX  -obtain vanc trough, if elevated >20, reduce vanc 1250mg (7/21-) frequency from q8 to q12; obtain the following vanc trough prior to the 4th dose  -change IV flagyl 500mg q6 to PO flagyl 500mg q8 (7/21-); it is possible that pt's nausea is related to high flagyl doses  -c/w CTX 2g q12 (7/24-)  -Duration of abx tx will likely be 6weeks due to bone involvement  -Will need repeat imaging (in 2wks?) in order to ascertain appropriate progress/response to treatment  -f/u S. pneumo FQ sensitivities  -recommend [facility]IS placement as pt will require multiple IV drug infusions once discharged; as she lives by herself w/o home resources, it is unlikely that pt will be capable of successfully fulfilling this care for herself, even with VNS services   -pt will need to obtain weekly labs once discharged, including CBC w/ differential, complete metabolic panel, erythrocyte sedimentation rate, C-reactive protein, and vancomycin troughs at the appropriate interval  -pt should f/u labs/blood work w/ Dr. Schneider once discharged   -administer prevnar vaccine prior to discharge    #Facial rash: patient w/ vesicular rash on the R nostril; likely herpetic infection  -starting valtrex 1g bid  -obtain bacterial and fungal swabs

## 2017-07-28 VITALS
SYSTOLIC BLOOD PRESSURE: 120 MMHG | OXYGEN SATURATION: 96 % | TEMPERATURE: 99 F | DIASTOLIC BLOOD PRESSURE: 71 MMHG | RESPIRATION RATE: 17 BRPM | HEART RATE: 88 BPM

## 2017-07-28 RX ORDER — VANCOMYCIN HCL 1 G
1250 VIAL (EA) INTRAVENOUS
Qty: 0 | Refills: 0 | COMMUNITY
Start: 2017-07-28

## 2017-07-28 RX ORDER — IBUPROFEN 200 MG
1 TABLET ORAL
Qty: 0 | Refills: 0 | COMMUNITY
Start: 2017-07-28

## 2017-07-28 RX ORDER — MUPIROCIN 20 MG/G
1 OINTMENT TOPICAL
Qty: 0 | Refills: 0 | COMMUNITY
Start: 2017-07-28

## 2017-07-28 RX ORDER — ACETAMINOPHEN 500 MG
1 TABLET ORAL
Qty: 0 | Refills: 0 | COMMUNITY
Start: 2017-07-28

## 2017-07-28 RX ORDER — LACTOBACILLUS ACIDOPH-L.BULGARICUS 1 MILLION CELL CHEWABLE TABLET 1MM CELL
1 TABLET,CHEWABLE ORAL
Qty: 0 | Refills: 0 | DISCHARGE
Start: 2017-07-28

## 2017-07-28 RX ORDER — PNEUMOCOCCAL 13-VALENT CONJUGATE VACCINE 2.2; 2.2; 2.2; 2.2; 2.2; 4.4; 2.2; 2.2; 2.2; 2.2; 2.2; 2.2; 2.2 UG/.5ML; UG/.5ML; UG/.5ML; UG/.5ML; UG/.5ML; UG/.5ML; UG/.5ML; UG/.5ML; UG/.5ML; UG/.5ML; UG/.5ML; UG/.5ML; UG/.5ML
0.5 INJECTION, SUSPENSION INTRAMUSCULAR ONCE
Qty: 0 | Refills: 0 | Status: DISCONTINUED | OUTPATIENT
Start: 2017-07-28 | End: 2017-07-28

## 2017-07-28 RX ORDER — METRONIDAZOLE 500 MG
1 TABLET ORAL
Qty: 0 | Refills: 0 | COMMUNITY
Start: 2017-07-28

## 2017-07-28 RX ORDER — CEFTRIAXONE 500 MG/1
2 INJECTION, POWDER, FOR SOLUTION INTRAMUSCULAR; INTRAVENOUS
Qty: 0 | Refills: 0 | COMMUNITY
Start: 2017-07-28

## 2017-07-28 RX ORDER — VALACYCLOVIR 500 MG/1
1 TABLET, FILM COATED ORAL
Qty: 0 | Refills: 0 | COMMUNITY
Start: 2017-07-28

## 2017-07-28 RX ADMIN — Medication 600 MILLIGRAM(S): at 00:10

## 2017-07-28 RX ADMIN — Medication 150 MICROGRAM(S): at 05:13

## 2017-07-28 RX ADMIN — VALACYCLOVIR 1000 MILLIGRAM(S): 500 TABLET, FILM COATED ORAL at 05:13

## 2017-07-28 RX ADMIN — CEFTRIAXONE 100 GRAM(S): 500 INJECTION, POWDER, FOR SOLUTION INTRAMUSCULAR; INTRAVENOUS at 05:12

## 2017-07-28 RX ADMIN — Medication 166.67 MILLIGRAM(S): at 10:50

## 2017-07-28 RX ADMIN — LACTOBACILLUS ACIDOPH-L.BULGARICUS 1 MILLION CELL CHEWABLE TABLET 1 TABLET(S): at 11:46

## 2017-07-28 RX ADMIN — HEPARIN SODIUM 5000 UNIT(S): 5000 INJECTION INTRAVENOUS; SUBCUTANEOUS at 05:12

## 2017-07-28 RX ADMIN — MUPIROCIN 1 APPLICATION(S): 20 OINTMENT TOPICAL at 11:46

## 2017-07-28 RX ADMIN — Medication 1 SPRAY(S): at 11:46

## 2017-07-28 RX ADMIN — MUPIROCIN 1 APPLICATION(S): 20 OINTMENT TOPICAL at 05:13

## 2017-07-28 RX ADMIN — Medication 1 SPRAY(S): at 05:13

## 2017-07-28 RX ADMIN — Medication 500 MILLIGRAM(S): at 05:13

## 2017-07-28 NOTE — PROGRESS NOTE ADULT - ASSESSMENT
66F with PMHx of hypothyroidism presented to [facility] on 7/20 at the request of urgent care center for evaluation of fever, earache and sinus pain x2d, found to have R mastoiditis,  pansinusitis, pneumocephalus, and inadvertent bacterial invasion into CNS from mastoiditis and small subdural collection right temporal lobe      #Pneumococcal bacteremia right otitis. MRI w/ resolved gas in brain, diffuse R pachymeningitis and frontoleptomeningitis, R ostomastoiditis w/ diffuse sinusitis and R subdural empyema.   -c/w vanc 1250mg q12 (7/21-); obtain the following vanc trough prior to the 4th dose  -c/w PO flagyl 500mg q8 (7/21-); c/w CTX 2g q12 (7/24-)  -Duration of abx tx will likely be 6weeks due to bone involvement  -Will need repeat imaging (in 2wks?) in order to ascertain appropriate progress/response to treatment  -f/u S. pneumo FQ sensitivities  -recommend LUIS placement as pt will require multiple IV drug infusions once discharged; as she lives by herself w/o home resources, it is unlikely that pt will be capable of successfully fulfilling this care for herself, even with VNS services   -pt will need to obtain weekly labs once discharged, including CBC w/ differential, complete metabolic panel, erythrocyte sedimentation rate, C-reactive protein, and vancomycin troughs at the appropriate interval  -pt should f/u labs/blood work w/ Dr. Schneider once discharged   -administer prevnar vaccine prior to discharge    #Facial rash: patient w/ vesicular rash on the R nostril; likely herpetic infection  -starting valtrex 1g bid  -obtain bacterial and fungal swabs 66F with PMHx of hypothyroidism presented to [facility] on 7/20 at the request of urgent care center for evaluation of fever, earache and sinus pain x2d, found to have R mastoiditis,  pansinusitis, pneumocephalus, and inadvertent bacterial invasion into CNS from mastoiditis and small subdural collection right temporal lobe      #Pneumococcal bacteremia right otitis. MRI w/ resolved gas in brain, diffuse R pachymeningitis and frontoleptomeningitis, R ostomastoiditis w/ diffuse sinusitis and R subdural empyema.   -c/w vanc 1250mg q12 (7/21-); obtain the following vanc trough prior to the 4th dose  -c/w PO flagyl 500mg q8 (7/21-); c/w CTX 2g q12 (7/24-)  -Discharge on this abx regimen; duration of abx tx will likely be 6weeks due to bone involvement but will need to f/u outpatient for further monitoring  -Will need repeat imaging (in 2wks?) in order to ascertain appropriate progress/response to treatment  -f/u S. pneumo FQ sensitivities  -recommend LUIS placement as pt will require multiple IV drug infusions once discharged; as she lives by herself w/o home resources, it is unlikely that pt will be capable of successfully fulfilling this care for herself, even with VNS services   -pt will need to obtain weekly labs once discharged, including CBC w/ differential, complete metabolic panel, erythrocyte sedimentation rate, C-reactive protein, and vancomycin troughs at the appropriate interval  -pt should f/u labs/blood work w/ Dr. Schneider once discharged w/i 1-2 weeks  -administer prevnar vaccine prior to discharge    #Facial rash: patient w/ vesicular rash on the R nostril; likely herpetic infection  -starting valtrex 1g bid  -obtain bacterial and fungal swabs

## 2017-07-28 NOTE — PROGRESS NOTE ADULT - SUBJECTIVE AND OBJECTIVE BOX
RADHA-HNS PROGRESS NOTE    HPI: 66F transfer from [facility] after ENT was consulted for R otalgia and temporal and frontal fossa pneumocephalus on CT max-fac. Reports that she had been suffering from seasonal allergies for 2 weeks and using a neti-pot and saline spray to rinse her sinus. However, 2 days ago, she suddenly developed moderate/severe otalgia and headache on the right. Endorses excessive blowing of nose during this period. Denies trauma. Pt had been seen at urgent care, but no instrumentation of her ear or sinuses.     7/21: Appreciated neurosurgical and ID input. Started on IV abx as per ID. Not for surgical intervention at this point. Monitoring for signs of meningism.  7/22:  Patient febrile to 103, mild tachycardia to 108 oVSS overnight. Pancultured. Vanc trough subtherapeutic at 10.8 so increased to 1.25 q8h. Continues on flagyl/cefepime. Nausea, no emesis, improved with phenergan. Improvement in headache. No meningeal signs. Denies photophobia.   7/23: ALANIS overnight. Febrile to 101x1 at 2PM but otherwise AF. tachycardia improved. HR 86-94. Mild headache improved with motrin. Feeling better. Nausea improved. VT 17.7.  7/24: ALANIS overnight. AFVSS. Attempted MRI however patient refused 2/2 nausea and anxiety. Still with nausea associated with vanc tx slightly improved with prn anti-nausea. No meningeal signs. Cefepime transitioned to ceftriaxone.   7/25: Had MRI brain/orbits/face yesterday. C/f meningitis and possible frontal empyema. Seen by NSGY who rec no surgical intervention. Became acutely delirious overnight thought to be 2/2 to ativan required for MRI, placed on 1:1 but back to baseline mental status and appropriate this AM. Tm102 at 5PM otherwise AFVSS. Pancultured. Continues on broad spectrum abx.   7/26: Pt stable. Feels ok. Delirium resolved. no acute events. Persistent R serous SUSAN on exam. TFTs normal.   7/27: Transferred to regional room. PICC placed yesterday for long-term IV abx. ALANIS overnight. AFVSS. Feels okay, still with intermittent nausea. No meningeal signs. No headache. Started on valtrex PO for possible nasal herpetic rash per ID.  7/28: ALANIS. AFVSS. Transitioned to flagyl PO q8h. Vanc trough supratherapeutic at 22.2, transitioned to 1250mg q12h. Doing well. No new complaints. Dispo planning ongoing. medically stable for d/c     Vital Signs Last 24 Hrs  T(C): 36.7 (28 Jul 2017 09:12), Max: 37.6 (27 Jul 2017 20:52)  T(F): 98 (28 Jul 2017 09:12), Max: 99.7 (27 Jul 2017 20:52)  HR: 78 (28 Jul 2017 09:12) (78 - 93)  BP: 116/78 (28 Jul 2017 09:12) (115/72 - 135/84)  BP(mean): --  RR: 17 (28 Jul 2017 09:12) (16 - 17)  SpO2: 99% (28 Jul 2017 09:12) (94% - 99%)      PE:  NAD, conversant, respond appropriately, following commands  NC/AT, EOMI, face grossly symmetric  no neck pain or meningismus, no nuchal rigidity   small cut to right nasal ala with surrounding erythema and swelling slightly improved from prior with yellowish crusting, no fluctuance or collection   vipin MARTINEZ     Labs:  BCx (7/20): +Strep pneumo, pansensitive   (7/21): negative  (7/24): NGTD  Nasal cx (7/21):coag neg Staph, clinda/PCN resistant     Imaging:   CT head d/w Dr Stoddard  Thin skullbase over bilateral mastoid and sphenoid,   Extradural and intradural air in left temporal and frontal region    MRI 7/24:   MRI brain/temporal bones demonstrates diffuse right pachymeningitis,   small (4 mm right subdural collection with frontal empyema, and right   frontoparietal leptomeningitis. No evidence of cerebritis or brain   abscess at this time. Intracranial gas appears to be resolving versus   resolved.  Thin section imaging shows no evidence of right temporal cephalocele into   the right tympanomastoid cavity at this time. Findings of right   otomastoiditis and diffuse sinusitis, as above.    Assessment/Plan:    65yo w pneumocephalus likely early OM w small effusion and Strep pneumo bacteremia and septicemia at OSH with ID following. Repeat cultures negative so far. MRI w/ e/o meningitis and 4mm empyema. Continues on broad spectrum abx.      - f/u repeat BCx 7/24  - ID following (Dr Cordon), appreciate recs            -Ceftriaxone 2g q12            -Flagyl 500mg q8 PO           -Vanco 1.25g q8           -Valtrex PO           -repeat cx q48hrs if febrile             s/p cefepime 7/20-24            s/p flagyl IV 7/20-27  -NSGY consulted - no acute intervention   -phenergan/zofran prn nausea   -prn pain  -Regular diet as tolerated   -bactroban to R naris  -probiotic   -Continue home synthroid   -DVT ppx: SCDs, SQH, ambulate  -[facility] at [phone] with any questions/concerns.    Dispo: Regional room, dispo pending placement   - - [facility] vs. home w/ VNS for IV abx     Discussed with attending who agrees with plan above.

## 2017-07-28 NOTE — PROGRESS NOTE ADULT - SUBJECTIVE AND OBJECTIVE BOX
INTERVAL HPI/OVERNIGHT EVENTS:     VITAL SIGNS:  T(F): 98 (07-28-17 @ 09:12)  HR: 78 (07-28-17 @ 09:12)  BP: 116/78 (07-28-17 @ 09:12)  RR: 17 (07-28-17 @ 09:12)  SpO2: 99% (07-28-17 @ 09:12)  Wt(kg): --    PHYSICAL EXAM:    Constitutional: NAD  Eyes: PERRL  Neck: supple  Respiratory: CTAB  Cardiovascular: RRR, no m/g/r  Gastrointestinal: soft, obese, NT, normoactive BS  Neurological: AAOx3    MEDICATIONS  (STANDING):  levothyroxine 150 MICROGram(s) Oral daily  heparin  Injectable 5000 Unit(s) SubCutaneous every 8 hours  lactobacillus acidophilus and bulgaricus Chewable 1 Tablet(s) Chew daily  cefTRIAXone   IVPB 2 Gram(s) IV Intermittent every 12 hours  sodium chloride 0.65% Nasal 1 Spray(s) Both Nostrils four times a day  mupirocin 2% Ointment 1 Application(s) Topical four times a day  sodium chloride 0.9% lock flush 20 milliLiter(s) IV Push once  valACYclovir 1000 milliGRAM(s) Oral two times a day  metroNIDAZOLE    Tablet 500 milliGRAM(s) Oral every 8 hours  vancomycin  IVPB 1250 milliGRAM(s) IV Intermittent every 12 hours  pneumococcal  13 Vaccine (PREVNAR 13) 0.5 milliLiter(s) IntraMuscular once    MEDICATIONS  (PRN):  acetaminophen   Tablet 325 milliGRAM(s) Oral every 4 hours PRN mild pain or fever >101  ibuprofen  Tablet 600 milliGRAM(s) Oral every 6 hours PRN headache  sodium chloride 0.9% lock flush 10 milliLiter(s) IV Push every 1 hour PRN After each medication administration  sodium chloride 0.9% lock flush 10 milliLiter(s) IV Push every 12 hours PRN Lumen of catheter NOT used  ondansetron Injectable 4 milliGRAM(s) IV Push every 8 hours PRN Nausea and/or Vomiting      Allergies    penicillin (Unknown)  sulfa drugs (Unknown)    Intolerances        LABS:                        12.0   8.1   )-----------( 259      ( 27 Jul 2017 06:39 )             36.2     07-27    138  |  101  |  6<L>  ----------------------------<  103<H>  3.6   |  26  |  0.60    Ca    9.2      27 Jul 2017 06:42            RADIOLOGY & ADDITIONAL TESTS:    Culture - Blood (07.24.17 @ 21:10)    Specimen Source: .Blood Blood-Venous    Culture Results:   No growth at 1 day.    Culture - Blood (07.21.17 @ 13:50)    Specimen Source: .Blood None    Culture Results:   No growth at 5 days.    EXAM:  MR BRAIN WO - W CONTRAST                          PROCEDURE DATE:  07/24/2017    Quantity of Contrast in Vial in ml: 7.5 Contrast Used: Gadovist  Quantity of Contrast Wasted in ml: 0    IMPRESSION:     MRI brain/temporal bones demonstrates diffuse right pachymeningitis,   small (4 mm right subdural collection with frontal empyema, and right   frontoparietal leptomeningitis. No evidence of cerebritis or brain   abscess at this time. Intracranial gas appears to be resolving versus   resolved.    Thin section imaging shows no evidence of right temporal cephalocele into   the right tympanomastoid cavity at this time. Findings of right   otomastoiditis and diffuse sinusitis, as above. INTERVAL HPI/OVERNIGHT EVENTS: no overnight events    VITAL SIGNS:  T(F): 98 (07-28-17 @ 09:12)  HR: 78 (07-28-17 @ 09:12)  BP: 116/78 (07-28-17 @ 09:12)  RR: 17 (07-28-17 @ 09:12)  SpO2: 99% (07-28-17 @ 09:12)  Wt(kg): --    PHYSICAL EXAM:    Constitutional: NAD  Eyes: PERRL  Neck: supple  Respiratory: CTAB  Cardiovascular: RRR, no m/g/r  Gastrointestinal: soft, obese, NT, normoactive BS  Neurological: AAOx3    MEDICATIONS  (STANDING):  levothyroxine 150 MICROGram(s) Oral daily  heparin  Injectable 5000 Unit(s) SubCutaneous every 8 hours  lactobacillus acidophilus and bulgaricus Chewable 1 Tablet(s) Chew daily  cefTRIAXone   IVPB 2 Gram(s) IV Intermittent every 12 hours  sodium chloride 0.65% Nasal 1 Spray(s) Both Nostrils four times a day  mupirocin 2% Ointment 1 Application(s) Topical four times a day  sodium chloride 0.9% lock flush 20 milliLiter(s) IV Push once  valACYclovir 1000 milliGRAM(s) Oral two times a day  metroNIDAZOLE    Tablet 500 milliGRAM(s) Oral every 8 hours  vancomycin  IVPB 1250 milliGRAM(s) IV Intermittent every 12 hours  pneumococcal  13 Vaccine (PREVNAR 13) 0.5 milliLiter(s) IntraMuscular once    MEDICATIONS  (PRN):  acetaminophen   Tablet 325 milliGRAM(s) Oral every 4 hours PRN mild pain or fever >101  ibuprofen  Tablet 600 milliGRAM(s) Oral every 6 hours PRN headache  sodium chloride 0.9% lock flush 10 milliLiter(s) IV Push every 1 hour PRN After each medication administration  sodium chloride 0.9% lock flush 10 milliLiter(s) IV Push every 12 hours PRN Lumen of catheter NOT used  ondansetron Injectable 4 milliGRAM(s) IV Push every 8 hours PRN Nausea and/or Vomiting      Allergies    penicillin (Unknown)  sulfa drugs (Unknown)    Intolerances        LABS:                        12.0   8.1   )-----------( 259      ( 27 Jul 2017 06:39 )             36.2     07-27    138  |  101  |  6<L>  ----------------------------<  103<H>  3.6   |  26  |  0.60    Ca    9.2      27 Jul 2017 06:42            RADIOLOGY & ADDITIONAL TESTS:    Culture - Blood (07.24.17 @ 21:10)    Specimen Source: .Blood Blood-Venous    Culture Results:   No growth at 1 day.    Culture - Blood (07.21.17 @ 13:50)    Specimen Source: .Blood None    Culture Results:   No growth at 5 days.    EXAM:  MR BRAIN WO - W CONTRAST                          PROCEDURE DATE:  07/24/2017    Quantity of Contrast in Vial in ml: 7.5 Contrast Used: Gadovist  Quantity of Contrast Wasted in ml: 0    IMPRESSION:     MRI brain/temporal bones demonstrates diffuse right pachymeningitis,   small (4 mm right subdural collection with frontal empyema, and right   frontoparietal leptomeningitis. No evidence of cerebritis or brain   abscess at this time. Intracranial gas appears to be resolving versus   resolved.    Thin section imaging shows no evidence of right temporal cephalocele into   the right tympanomastoid cavity at this time. Findings of right   otomastoiditis and diffuse sinusitis, as above.

## 2017-07-29 LAB
CULTURE RESULTS: SIGNIFICANT CHANGE UP
SPECIMEN SOURCE: SIGNIFICANT CHANGE UP

## 2017-07-31 ENCOUNTER — APPOINTMENT (OUTPATIENT)
Dept: OTOLARYNGOLOGY | Facility: CLINIC | Age: 67
End: 2017-07-31

## 2017-07-31 DIAGNOSIS — G06.2 EXTRADURAL AND SUBDURAL ABSCESS, UNSPECIFIED: ICD-10-CM

## 2017-07-31 DIAGNOSIS — A41.9 SEPSIS, UNSPECIFIED ORGANISM: ICD-10-CM

## 2017-07-31 DIAGNOSIS — K46.9 UNSPECIFIED ABDOMINAL HERNIA WITHOUT OBSTRUCTION OR GANGRENE: ICD-10-CM

## 2017-07-31 DIAGNOSIS — H75.01: ICD-10-CM

## 2017-07-31 DIAGNOSIS — J32.8 OTHER CHRONIC SINUSITIS: ICD-10-CM

## 2017-07-31 DIAGNOSIS — Z88.2 ALLERGY STATUS TO SULFONAMIDES: ICD-10-CM

## 2017-07-31 DIAGNOSIS — B95.3 STREPTOCOCCUS PNEUMONIAE AS THE CAUSE OF DISEASES CLASSIFIED ELSEWHERE: ICD-10-CM

## 2017-07-31 DIAGNOSIS — Z88.0 ALLERGY STATUS TO PENICILLIN: ICD-10-CM

## 2017-07-31 DIAGNOSIS — E03.9 HYPOTHYROIDISM, UNSPECIFIED: ICD-10-CM

## 2017-07-31 DIAGNOSIS — G93.89 OTHER SPECIFIED DISORDERS OF BRAIN: ICD-10-CM

## 2017-07-31 DIAGNOSIS — G03.9 MENINGITIS, UNSPECIFIED: ICD-10-CM

## 2017-07-31 DIAGNOSIS — B00.9 HERPESVIRAL INFECTION, UNSPECIFIED: ICD-10-CM

## 2017-07-31 DIAGNOSIS — H65.91 UNSPECIFIED NONSUPPURATIVE OTITIS MEDIA, RIGHT EAR: ICD-10-CM

## 2017-08-05 DIAGNOSIS — F05 DELIRIUM DUE TO KNOWN PHYSIOLOGICAL CONDITION: ICD-10-CM

## 2017-08-07 ENCOUNTER — APPOINTMENT (OUTPATIENT)
Dept: OTOLARYNGOLOGY | Facility: CLINIC | Age: 67
End: 2017-08-07
Payer: MEDICARE

## 2017-08-07 DIAGNOSIS — Z83.49 FAMILY HISTORY OF OTHER ENDOCRINE, NUTRITIONAL AND METABOLIC DISEASES: ICD-10-CM

## 2017-08-07 PROCEDURE — 99204 OFFICE O/P NEW MOD 45 MIN: CPT

## 2017-08-08 ENCOUNTER — APPOINTMENT (OUTPATIENT)
Dept: INFECTIOUS DISEASE | Facility: CLINIC | Age: 67
End: 2017-08-08
Payer: MEDICARE

## 2017-08-08 VITALS
SYSTOLIC BLOOD PRESSURE: 122 MMHG | OXYGEN SATURATION: 98 % | DIASTOLIC BLOOD PRESSURE: 86 MMHG | RESPIRATION RATE: 14 BRPM | TEMPERATURE: 98.3 F | HEIGHT: 66 IN | BODY MASS INDEX: 33.59 KG/M2 | HEART RATE: 77 BPM | WEIGHT: 209 LBS

## 2017-08-08 PROCEDURE — 99205 OFFICE O/P NEW HI 60 MIN: CPT

## 2017-08-08 RX ORDER — CYCLOBENZAPRINE HYDROCHLORIDE 10 MG/1
10 TABLET, FILM COATED ORAL
Qty: 30 | Refills: 0 | Status: COMPLETED | COMMUNITY
Start: 2017-05-03

## 2017-08-08 RX ORDER — FLUCONAZOLE 150 MG/1
150 TABLET ORAL
Qty: 2 | Refills: 0 | Status: COMPLETED | COMMUNITY
Start: 2017-07-20

## 2017-08-08 RX ORDER — LEVOFLOXACIN 500 MG/1
500 TABLET, FILM COATED ORAL
Qty: 14 | Refills: 0 | Status: COMPLETED | COMMUNITY
Start: 2017-07-20

## 2017-08-08 RX ORDER — PREDNISONE 20 MG/1
20 TABLET ORAL
Qty: 7 | Refills: 0 | Status: COMPLETED | COMMUNITY
Start: 2017-04-27

## 2017-08-12 ENCOUNTER — EMERGENCY (EMERGENCY)
Facility: HOSPITAL | Age: 67
LOS: 1 days | Discharge: HOME | End: 2017-08-12
Attending: EMERGENCY MEDICINE | Admitting: EMERGENCY MEDICINE
Payer: MEDICARE

## 2017-08-12 VITALS
RESPIRATION RATE: 16 BRPM | DIASTOLIC BLOOD PRESSURE: 87 MMHG | HEART RATE: 82 BPM | SYSTOLIC BLOOD PRESSURE: 147 MMHG | TEMPERATURE: 100 F | OXYGEN SATURATION: 95 %

## 2017-08-12 VITALS
OXYGEN SATURATION: 99 % | RESPIRATION RATE: 16 BRPM | HEART RATE: 77 BPM | DIASTOLIC BLOOD PRESSURE: 78 MMHG | TEMPERATURE: 99 F | SYSTOLIC BLOOD PRESSURE: 135 MMHG

## 2017-08-12 DIAGNOSIS — Z88.0 ALLERGY STATUS TO PENICILLIN: ICD-10-CM

## 2017-08-12 DIAGNOSIS — Z79.899 OTHER LONG TERM (CURRENT) DRUG THERAPY: ICD-10-CM

## 2017-08-12 DIAGNOSIS — E87.6 HYPOKALEMIA: ICD-10-CM

## 2017-08-12 DIAGNOSIS — R11.2 NAUSEA WITH VOMITING, UNSPECIFIED: ICD-10-CM

## 2017-08-12 DIAGNOSIS — Z79.1 LONG TERM (CURRENT) USE OF NON-STEROIDAL ANTI-INFLAMMATORIES (NSAID): ICD-10-CM

## 2017-08-12 DIAGNOSIS — Z88.2 ALLERGY STATUS TO SULFONAMIDES: ICD-10-CM

## 2017-08-12 DIAGNOSIS — E03.9 HYPOTHYROIDISM, UNSPECIFIED: ICD-10-CM

## 2017-08-12 DIAGNOSIS — Z79.891 LONG TERM (CURRENT) USE OF OPIATE ANALGESIC: ICD-10-CM

## 2017-08-12 LAB
ALBUMIN SERPL ELPH-MCNC: 3.8 G/DL — SIGNIFICANT CHANGE UP (ref 3.3–5)
ALP SERPL-CCNC: 56 U/L — SIGNIFICANT CHANGE UP (ref 40–120)
ALT FLD-CCNC: 13 U/L — SIGNIFICANT CHANGE UP (ref 10–45)
ANION GAP SERPL CALC-SCNC: 19 MMOL/L — HIGH (ref 5–17)
APTT BLD: 28.3 SEC — SIGNIFICANT CHANGE UP (ref 27.5–37.4)
AST SERPL-CCNC: 15 U/L — SIGNIFICANT CHANGE UP (ref 10–40)
BASOPHILS NFR BLD AUTO: 0.7 % — SIGNIFICANT CHANGE UP (ref 0–2)
BILIRUB SERPL-MCNC: 0.4 MG/DL — SIGNIFICANT CHANGE UP (ref 0.2–1.2)
BUN SERPL-MCNC: 7 MG/DL — SIGNIFICANT CHANGE UP (ref 7–23)
CALCIUM SERPL-MCNC: 9.2 MG/DL — SIGNIFICANT CHANGE UP (ref 8.4–10.5)
CHLORIDE SERPL-SCNC: 97 MMOL/L — SIGNIFICANT CHANGE UP (ref 96–108)
CO2 SERPL-SCNC: 24 MMOL/L — SIGNIFICANT CHANGE UP (ref 22–31)
CREAT SERPL-MCNC: 0.9 MG/DL — SIGNIFICANT CHANGE UP (ref 0.5–1.3)
EOSINOPHIL NFR BLD AUTO: 1.2 % — SIGNIFICANT CHANGE UP (ref 0–6)
EXTRA SST TUBE: SIGNIFICANT CHANGE UP
GLUCOSE SERPL-MCNC: 114 MG/DL — HIGH (ref 70–99)
HCT VFR BLD CALC: 37.6 % — SIGNIFICANT CHANGE UP (ref 34.5–45)
HGB BLD-MCNC: 12.7 G/DL — SIGNIFICANT CHANGE UP (ref 11.5–15.5)
INR BLD: 1.04 — SIGNIFICANT CHANGE UP (ref 0.88–1.16)
LACTATE SERPL-SCNC: 0.8 MMOL/L — SIGNIFICANT CHANGE UP (ref 0.5–2)
LIDOCAIN IGE QN: 16 U/L — SIGNIFICANT CHANGE UP (ref 7–60)
LYMPHOCYTES # BLD AUTO: 13.7 % — SIGNIFICANT CHANGE UP (ref 13–44)
MCHC RBC-ENTMCNC: 28.3 PG — SIGNIFICANT CHANGE UP (ref 27–34)
MCHC RBC-ENTMCNC: 33.8 G/DL — SIGNIFICANT CHANGE UP (ref 32–36)
MCV RBC AUTO: 83.9 FL — SIGNIFICANT CHANGE UP (ref 80–100)
MONOCYTES NFR BLD AUTO: 7.4 % — SIGNIFICANT CHANGE UP (ref 2–14)
NEUTROPHILS NFR BLD AUTO: 77 % — SIGNIFICANT CHANGE UP (ref 43–77)
PLATELET # BLD AUTO: 185 K/UL — SIGNIFICANT CHANGE UP (ref 150–400)
POTASSIUM SERPL-MCNC: 3.1 MMOL/L — LOW (ref 3.5–5.3)
POTASSIUM SERPL-SCNC: 3.1 MMOL/L — LOW (ref 3.5–5.3)
PROT SERPL-MCNC: 7.4 G/DL — SIGNIFICANT CHANGE UP (ref 6–8.3)
PROTHROM AB SERPL-ACNC: 11.6 SEC — SIGNIFICANT CHANGE UP (ref 9.8–12.7)
RBC # BLD: 4.48 M/UL — SIGNIFICANT CHANGE UP (ref 3.8–5.2)
RBC # FLD: 14.4 % — SIGNIFICANT CHANGE UP (ref 10.3–16.9)
SODIUM SERPL-SCNC: 140 MMOL/L — SIGNIFICANT CHANGE UP (ref 135–145)
WBC # BLD: 6.8 K/UL — SIGNIFICANT CHANGE UP (ref 3.8–10.5)
WBC # FLD AUTO: 6.8 K/UL — SIGNIFICANT CHANGE UP (ref 3.8–10.5)

## 2017-08-12 PROCEDURE — 70470 CT HEAD/BRAIN W/O & W/DYE: CPT

## 2017-08-12 PROCEDURE — 85730 THROMBOPLASTIN TIME PARTIAL: CPT

## 2017-08-12 PROCEDURE — 70470 CT HEAD/BRAIN W/O & W/DYE: CPT | Mod: 26

## 2017-08-12 PROCEDURE — 99285 EMERGENCY DEPT VISIT HI MDM: CPT

## 2017-08-12 PROCEDURE — 96374 THER/PROPH/DIAG INJ IV PUSH: CPT | Mod: XU

## 2017-08-12 PROCEDURE — 83605 ASSAY OF LACTIC ACID: CPT

## 2017-08-12 PROCEDURE — 85610 PROTHROMBIN TIME: CPT

## 2017-08-12 PROCEDURE — 80053 COMPREHEN METABOLIC PANEL: CPT

## 2017-08-12 PROCEDURE — 99284 EMERGENCY DEPT VISIT MOD MDM: CPT | Mod: 25

## 2017-08-12 PROCEDURE — 85025 COMPLETE CBC W/AUTO DIFF WBC: CPT

## 2017-08-12 PROCEDURE — 83690 ASSAY OF LIPASE: CPT

## 2017-08-12 RX ORDER — SODIUM CHLORIDE 9 MG/ML
1000 INJECTION INTRAMUSCULAR; INTRAVENOUS; SUBCUTANEOUS ONCE
Qty: 0 | Refills: 0 | Status: COMPLETED | OUTPATIENT
Start: 2017-08-12 | End: 2017-08-12

## 2017-08-12 RX ORDER — METRONIDAZOLE 500 MG/1
500 TABLET ORAL EVERY 8 HOURS
Refills: 0 | Status: DISCONTINUED | COMMUNITY
End: 2017-08-12

## 2017-08-12 RX ORDER — POTASSIUM CHLORIDE 20 MEQ
40 PACKET (EA) ORAL ONCE
Qty: 0 | Refills: 0 | Status: COMPLETED | OUTPATIENT
Start: 2017-08-12 | End: 2017-08-12

## 2017-08-12 RX ADMIN — Medication 204 MILLIGRAM(S): at 16:25

## 2017-08-12 RX ADMIN — Medication 40 MILLIEQUIVALENT(S): at 17:18

## 2017-08-12 RX ADMIN — SODIUM CHLORIDE 1000 MILLILITER(S): 9 INJECTION INTRAMUSCULAR; INTRAVENOUS; SUBCUTANEOUS at 15:50

## 2017-08-12 NOTE — ED ADULT NURSE NOTE - CHIEF COMPLAINT QUOTE
Pt BIBA from [facility] c/o nausea and vomiting for the past 5 days. Pt macrina she was recently diagnosed with bacterial meningitis and admitted here at [facility], [place] [facility] and currently receiving antibiotics.

## 2017-08-12 NOTE — ED PROVIDER NOTE - PROGRESS NOTE DETAILS
D/w Dr Schneider, ID, who has spoken directly w/ ENT. Nausea may be 2/2 worsening infection. Requesting CT head w and w/o IV contrast. CT w/o progression of disease. Again spoken w/ Dr Schneider: stop Vanco, Flagyl, continue ceftriaxone. She will f/u w/ pt on Monday. If feeling no better, she will see pt Tuesday. Dr Schneider has spoken directly w/ pt who agrees w/ plan. Pt still reports nausea, but has tolerated PO in the ED. CT w/o progression of disease. Again spoken w/ Dr Schneider: stop Vanco, Flagyl, continue ceftriaxone. She will f/u w/ pt on Monday. If feeling not better, she will see pt Tuesday. Dr Schneider has spoken directly w/ pt who agrees w/ plan. Pt still reports nausea, but has tolerated PO in the ED. K repleted.

## 2017-08-12 NOTE — ED ADULT TRIAGE NOTE - CHIEF COMPLAINT QUOTE
Pt BIBA from [facility] c/o nausea and vomiting for the past 5 days. Pt macrina she was recently diagnosed with bacterial meningitis and admitted here at [facility], has a PICC line to [facility] and currently receiving antibiotics.

## 2017-08-12 NOTE — ED ADULT NURSE REASSESSMENT NOTE - NS ED NURSE REASSESS COMMENT FT1
[facility] policy if returning to nursing home. Pt is A&Ox3 appears comfortable VSS. Multiple extended conversations with patient between MD Logan and this RN as well as  about DC instructions. Very specific instuctions were typed in detail about what abx to stop, which to continue in order to relieve that nausea. Pt tolerate PO water, crackers, and turkey sandwich. Pt herself even states "I barely vomit".  offered to help pt was content to sign DC papers

## 2017-08-12 NOTE — ED PROVIDER NOTE - OBJECTIVE STATEMENT
Pt w/ PMHx hypothyroidism, PSHx abdominal hernia s/p repair, recent admission to [facility] 7/20-7/27/17 for otomastoiditis / sinusitis, pneumocephalus, pachymeningitis, leptomeningitis, on PO Flagyl, IV Vanco and IV Ceftriaxone in the [facility] rehab center returns to the ED today for n/v x 5 days. Pt w/ PMHx hypothyroidism, PSHx abdominal hernia s/p repair, recent admission to [facility] 7/20-7/27/17 for otomastoiditis / sinusitis, pneumocephalus, pachymeningitis, leptomeningitis, on PO Flagyl, IV Vanco and IV Ceftriaxone in the [facility] rehab center returns to the ED today for n/v x 5 days. Pt reports while she was admitted in the hospital the only thing that controlled her n/v was IV Phenergan. Since having been at rehab, pt has been given Zofran, Reglan, Compazine w/o relief. Pt reports the rehab does not have IV Phenergan on formulary, but they were able to give her PO Phenergan, but she could not tolerate it. Pt reports the nausea is significant, but has vomited 3-4 times, white mucous, in the past 24 hours. No f/c. No HA, facial pain, ear pain, neck pain or stiffness. No abdominal pain. No diarrhea or constipation. No other complaints.

## 2017-08-12 NOTE — ED PROVIDER NOTE - MEDICAL DECISION MAKING DETAILS
Pt p/w nausea, some vomiting, for several days while on IV / PO abx for complicated course of otomastoiditis and sinusitis. Benign exam, mildly dry, non toxic-appearing. DDx includes medication adverse effect, dehydration, progression of disease, electrolyte imbalance, other pathology. Check labs, IVF, antiemetics, d/w ID. Dispo pending w/u and clinical status

## 2017-08-22 ENCOUNTER — APPOINTMENT (OUTPATIENT)
Dept: INFECTIOUS DISEASE | Facility: CLINIC | Age: 67
End: 2017-08-22
Payer: MEDICARE

## 2017-08-22 VITALS
SYSTOLIC BLOOD PRESSURE: 140 MMHG | WEIGHT: 200 LBS | DIASTOLIC BLOOD PRESSURE: 80 MMHG | RESPIRATION RATE: 14 BRPM | HEART RATE: 85 BPM | HEIGHT: 66 IN | BODY MASS INDEX: 32.14 KG/M2 | TEMPERATURE: 98.6 F | OXYGEN SATURATION: 98 %

## 2017-08-22 PROCEDURE — 99214 OFFICE O/P EST MOD 30 MIN: CPT

## 2017-08-22 RX ORDER — ACETAMINOPHEN 325 MG/1
TABLET, FILM COATED ORAL
Refills: 0 | Status: COMPLETED | COMMUNITY
End: 2017-08-22

## 2017-09-05 ENCOUNTER — APPOINTMENT (OUTPATIENT)
Dept: INFECTIOUS DISEASE | Facility: CLINIC | Age: 67
End: 2017-09-05
Payer: MEDICARE

## 2017-09-05 ENCOUNTER — FORM ENCOUNTER (OUTPATIENT)
Age: 67
End: 2017-09-05

## 2017-09-05 VITALS
DIASTOLIC BLOOD PRESSURE: 88 MMHG | HEIGHT: 66 IN | RESPIRATION RATE: 14 BRPM | BODY MASS INDEX: 32.3 KG/M2 | WEIGHT: 201 LBS | OXYGEN SATURATION: 98 % | HEART RATE: 88 BPM | SYSTOLIC BLOOD PRESSURE: 130 MMHG | TEMPERATURE: 98.5 F

## 2017-09-05 PROCEDURE — 36415 COLL VENOUS BLD VENIPUNCTURE: CPT

## 2017-09-05 PROCEDURE — 99214 OFFICE O/P EST MOD 30 MIN: CPT | Mod: 25

## 2017-09-06 ENCOUNTER — OUTPATIENT (OUTPATIENT)
Dept: OUTPATIENT SERVICES | Facility: HOSPITAL | Age: 67
LOS: 1 days | End: 2017-09-06
Payer: MEDICARE

## 2017-09-06 LAB
ALBUMIN SERPL ELPH-MCNC: 4.3 G/DL
ALP BLD-CCNC: 65 U/L
ALT SERPL-CCNC: 31 U/L
ANION GAP SERPL CALC-SCNC: 16 MMOL/L
APPEARANCE: CLEAR
AST SERPL-CCNC: 23 U/L
BASOPHILS # BLD AUTO: 0.03 K/UL
BASOPHILS NFR BLD AUTO: 0.5 %
BILIRUB SERPL-MCNC: 0.4 MG/DL
BILIRUBIN URINE: NEGATIVE
BLOOD URINE: NEGATIVE
BUN SERPL-MCNC: 11 MG/DL
CALCIUM SERPL-MCNC: 9.7 MG/DL
CHLORIDE SERPL-SCNC: 101 MMOL/L
CO2 SERPL-SCNC: 22 MMOL/L
COLOR: YELLOW
CREAT SERPL-MCNC: 0.79 MG/DL
CRP SERPL-MCNC: <0.2 MG/DL
EOSINOPHIL # BLD AUTO: 0.15 K/UL
EOSINOPHIL NFR BLD AUTO: 2.7 %
ERYTHROCYTE [SEDIMENTATION RATE] IN BLOOD BY WESTERGREN METHOD: 20 MM/HR
GLUCOSE QUALITATIVE U: NORMAL MG/DL
GLUCOSE SERPL-MCNC: 90 MG/DL
HCT VFR BLD CALC: 42 %
HGB BLD-MCNC: 13.8 G/DL
IMM GRANULOCYTES NFR BLD AUTO: 0.2 %
KETONES URINE: NEGATIVE
LEUKOCYTE ESTERASE URINE: NEGATIVE
LYMPHOCYTES # BLD AUTO: 2.42 K/UL
LYMPHOCYTES NFR BLD AUTO: 44 %
MAN DIFF?: NORMAL
MCHC RBC-ENTMCNC: 27.6 PG
MCHC RBC-ENTMCNC: 32.9 GM/DL
MCV RBC AUTO: 84 FL
MONOCYTES # BLD AUTO: 0.55 K/UL
MONOCYTES NFR BLD AUTO: 10 %
NEUTROPHILS # BLD AUTO: 2.34 K/UL
NEUTROPHILS NFR BLD AUTO: 42.6 %
NITRITE URINE: NEGATIVE
PH URINE: 6
PLATELET # BLD AUTO: 201 K/UL
POTASSIUM SERPL-SCNC: 4.3 MMOL/L
PROT SERPL-MCNC: 7.7 G/DL
PROTEIN URINE: NEGATIVE MG/DL
RBC # BLD: 5 M/UL
RBC # FLD: 14.2 %
SODIUM SERPL-SCNC: 139 MMOL/L
SPECIFIC GRAVITY URINE: 1.02
UROBILINOGEN URINE: NORMAL MG/DL
WBC # FLD AUTO: 5.5 K/UL

## 2017-09-06 PROCEDURE — 70486 CT MAXILLOFACIAL W/O DYE: CPT | Mod: 26

## 2017-09-06 PROCEDURE — 70486 CT MAXILLOFACIAL W/O DYE: CPT

## 2017-09-07 ENCOUNTER — APPOINTMENT (OUTPATIENT)
Dept: OTOLARYNGOLOGY | Facility: CLINIC | Age: 67
End: 2017-09-07
Payer: MEDICARE

## 2017-09-07 VITALS — SYSTOLIC BLOOD PRESSURE: 105 MMHG | OXYGEN SATURATION: 97 % | DIASTOLIC BLOOD PRESSURE: 70 MMHG | HEART RATE: 110 BPM

## 2017-09-07 PROCEDURE — 99214 OFFICE O/P EST MOD 30 MIN: CPT

## 2017-09-07 PROCEDURE — 92550 TYMPANOMETRY & REFLEX THRESH: CPT

## 2017-09-07 PROCEDURE — 92557 COMPREHENSIVE HEARING TEST: CPT

## 2017-09-08 ENCOUNTER — LABORATORY RESULT (OUTPATIENT)
Age: 67
End: 2017-09-08

## 2017-09-08 ENCOUNTER — APPOINTMENT (OUTPATIENT)
Dept: INTERNAL MEDICINE | Facility: CLINIC | Age: 67
End: 2017-09-08
Payer: MEDICARE

## 2017-09-08 VITALS
BODY MASS INDEX: 32.3 KG/M2 | SYSTOLIC BLOOD PRESSURE: 114 MMHG | TEMPERATURE: 98.6 F | OXYGEN SATURATION: 98 % | HEIGHT: 66 IN | DIASTOLIC BLOOD PRESSURE: 82 MMHG | HEART RATE: 92 BPM | WEIGHT: 201 LBS

## 2017-09-08 DIAGNOSIS — Z83.49 FAMILY HISTORY OF OTHER ENDOCRINE, NUTRITIONAL AND METABOLIC DISEASES: ICD-10-CM

## 2017-09-08 DIAGNOSIS — Z72.0 TOBACCO USE: ICD-10-CM

## 2017-09-08 DIAGNOSIS — Z80.3 FAMILY HISTORY OF MALIGNANT NEOPLASM OF BREAST: ICD-10-CM

## 2017-09-08 DIAGNOSIS — F15.90 OTHER STIMULANT USE, UNSPECIFIED, UNCOMPLICATED: ICD-10-CM

## 2017-09-08 DIAGNOSIS — Z78.9 OTHER SPECIFIED HEALTH STATUS: ICD-10-CM

## 2017-09-08 DIAGNOSIS — Z82.49 FAMILY HISTORY OF ISCHEMIC HEART DISEASE AND OTHER DISEASES OF THE CIRCULATORY SYSTEM: ICD-10-CM

## 2017-09-08 DIAGNOSIS — Z87.19 PERSONAL HISTORY OF OTHER DISEASES OF THE DIGESTIVE SYSTEM: ICD-10-CM

## 2017-09-08 DIAGNOSIS — F19.90 OTHER PSYCHOACTIVE SUBSTANCE USE, UNSPECIFIED, UNCOMPLICATED: ICD-10-CM

## 2017-09-08 PROCEDURE — 36415 COLL VENOUS BLD VENIPUNCTURE: CPT

## 2017-09-08 PROCEDURE — 90688 IIV4 VACCINE SPLT 0.5 ML IM: CPT

## 2017-09-08 PROCEDURE — G0402 INITIAL PREVENTIVE EXAM: CPT

## 2017-09-08 PROCEDURE — G0008: CPT

## 2017-09-08 PROCEDURE — G0009: CPT

## 2017-09-08 PROCEDURE — 90732 PPSV23 VACC 2 YRS+ SUBQ/IM: CPT

## 2017-09-08 PROCEDURE — 90686 IIV4 VACC NO PRSV 0.5 ML IM: CPT

## 2017-09-08 RX ORDER — FLUCONAZOLE 150 MG/1
150 TABLET ORAL
Qty: 2 | Refills: 0 | Status: DISCONTINUED | COMMUNITY
Start: 2017-09-05 | End: 2017-09-08

## 2017-09-08 RX ORDER — COLCHICINE 0.6 MG/1
0.6 TABLET, FILM COATED ORAL DAILY
Refills: 0 | Status: DISCONTINUED | COMMUNITY
End: 2017-09-08

## 2017-09-09 ENCOUNTER — MOBILE ON CALL (OUTPATIENT)
Age: 67
End: 2017-09-09

## 2017-09-11 ENCOUNTER — MESSAGE (OUTPATIENT)
Age: 67
End: 2017-09-11

## 2017-09-11 LAB
25(OH)D3 SERPL-MCNC: 10.9 NG/ML
ALBUMIN SERPL ELPH-MCNC: 4.4 G/DL
ALP BLD-CCNC: 67 U/L
ALT SERPL-CCNC: 23 U/L
ANION GAP SERPL CALC-SCNC: 20 MMOL/L
AST SERPL-CCNC: 14 U/L
BASOPHILS # BLD AUTO: 0.03 K/UL
BASOPHILS NFR BLD AUTO: 0.6 %
BILIRUB SERPL-MCNC: 0.3 MG/DL
BUN SERPL-MCNC: 14 MG/DL
CALCIUM SERPL-MCNC: 9.9 MG/DL
CHLORIDE SERPL-SCNC: 103 MMOL/L
CHOLEST SERPL-MCNC: 250 MG/DL
CHOLEST/HDLC SERPL: 5.7 RATIO
CO2 SERPL-SCNC: 20 MMOL/L
CREAT SERPL-MCNC: 0.74 MG/DL
EOSINOPHIL # BLD AUTO: 0.17 K/UL
EOSINOPHIL NFR BLD AUTO: 3.4 %
GLUCOSE SERPL-MCNC: 113 MG/DL
HBA1C MFR BLD HPLC: 5.7 %
HCT VFR BLD CALC: 41.9 %
HCV AB SER QL: NONREACTIVE
HCV S/CO RATIO: 0.13 S/CO
HDLC SERPL-MCNC: 44 MG/DL
HGB BLD-MCNC: 13.7 G/DL
IMM GRANULOCYTES NFR BLD AUTO: 0.4 %
LDLC SERPL CALC-MCNC: 133 MG/DL
LYMPHOCYTES # BLD AUTO: 2.12 K/UL
LYMPHOCYTES NFR BLD AUTO: 42.7 %
MAN DIFF?: NORMAL
MCHC RBC-ENTMCNC: 27.8 PG
MCHC RBC-ENTMCNC: 32.7 GM/DL
MCV RBC AUTO: 85 FL
MONOCYTES # BLD AUTO: 0.35 K/UL
MONOCYTES NFR BLD AUTO: 7.1 %
NEUTROPHILS # BLD AUTO: 2.27 K/UL
NEUTROPHILS NFR BLD AUTO: 45.8 %
PLATELET # BLD AUTO: 186 K/UL
POTASSIUM SERPL-SCNC: 3.9 MMOL/L
PROT SERPL-MCNC: 7.2 G/DL
RBC # BLD: 4.93 M/UL
RBC # FLD: 14.4 %
SODIUM SERPL-SCNC: 143 MMOL/L
TRIGL SERPL-MCNC: 367 MG/DL
TSH SERPL-ACNC: 0.23 UIU/ML
WBC # FLD AUTO: 4.96 K/UL

## 2017-09-15 ENCOUNTER — APPOINTMENT (OUTPATIENT)
Dept: OTOLARYNGOLOGY | Facility: CLINIC | Age: 67
End: 2017-09-15

## 2017-09-18 ENCOUNTER — APPOINTMENT (OUTPATIENT)
Dept: OTOLARYNGOLOGY | Facility: CLINIC | Age: 67
End: 2017-09-18
Payer: MEDICARE

## 2017-09-18 VITALS
SYSTOLIC BLOOD PRESSURE: 128 MMHG | WEIGHT: 201 LBS | TEMPERATURE: 97.9 F | BODY MASS INDEX: 32.3 KG/M2 | HEIGHT: 66 IN | DIASTOLIC BLOOD PRESSURE: 76 MMHG | HEART RATE: 97 BPM

## 2017-09-18 PROCEDURE — 99214 OFFICE O/P EST MOD 30 MIN: CPT

## 2017-09-25 ENCOUNTER — APPOINTMENT (OUTPATIENT)
Dept: NEUROSURGERY | Facility: CLINIC | Age: 67
End: 2017-09-25
Payer: MEDICARE

## 2017-09-25 VITALS
OXYGEN SATURATION: 90 % | HEART RATE: 95 BPM | DIASTOLIC BLOOD PRESSURE: 69 MMHG | HEIGHT: 66 IN | WEIGHT: 201 LBS | BODY MASS INDEX: 32.3 KG/M2 | SYSTOLIC BLOOD PRESSURE: 113 MMHG

## 2017-09-25 PROCEDURE — 99204 OFFICE O/P NEW MOD 45 MIN: CPT

## 2017-09-26 ENCOUNTER — APPOINTMENT (OUTPATIENT)
Dept: INTERNAL MEDICINE | Facility: CLINIC | Age: 67
End: 2017-09-26
Payer: MEDICARE

## 2017-09-26 VITALS
WEIGHT: 201 LBS | DIASTOLIC BLOOD PRESSURE: 80 MMHG | HEIGHT: 66 IN | SYSTOLIC BLOOD PRESSURE: 130 MMHG | BODY MASS INDEX: 32.3 KG/M2 | HEART RATE: 100 BPM | OXYGEN SATURATION: 96 % | TEMPERATURE: 98 F

## 2017-09-26 DIAGNOSIS — Z92.29 PERSONAL HISTORY OF OTHER DRUG THERAPY: ICD-10-CM

## 2017-09-26 DIAGNOSIS — B37.3 CANDIDIASIS OF VULVA AND VAGINA: ICD-10-CM

## 2017-09-26 DIAGNOSIS — B37.2 CANDIDIASIS OF SKIN AND NAIL: ICD-10-CM

## 2017-09-26 PROCEDURE — 36415 COLL VENOUS BLD VENIPUNCTURE: CPT

## 2017-09-26 PROCEDURE — 93000 ELECTROCARDIOGRAM COMPLETE: CPT

## 2017-09-26 PROCEDURE — 99213 OFFICE O/P EST LOW 20 MIN: CPT | Mod: 25

## 2017-09-27 ENCOUNTER — MEDICATION RENEWAL (OUTPATIENT)
Age: 67
End: 2017-09-27

## 2017-10-02 ENCOUNTER — APPOINTMENT (OUTPATIENT)
Dept: OTOLARYNGOLOGY | Facility: CLINIC | Age: 67
End: 2017-10-02
Payer: MEDICARE

## 2017-10-02 VITALS
DIASTOLIC BLOOD PRESSURE: 71 MMHG | BODY MASS INDEX: 31.82 KG/M2 | WEIGHT: 198 LBS | HEIGHT: 66 IN | TEMPERATURE: 98.5 F | HEART RATE: 89 BPM | SYSTOLIC BLOOD PRESSURE: 118 MMHG

## 2017-10-02 DIAGNOSIS — Z86.39 PERSONAL HISTORY OF OTHER ENDOCRINE, NUTRITIONAL AND METABOLIC DISEASE: ICD-10-CM

## 2017-10-02 DIAGNOSIS — Z80.3 FAMILY HISTORY OF MALIGNANT NEOPLASM OF BREAST: ICD-10-CM

## 2017-10-02 PROCEDURE — 99214 OFFICE O/P EST MOD 30 MIN: CPT

## 2017-10-04 PROBLEM — Z80.3 FAMILY HISTORY OF MALIGNANT NEOPLASM OF BREAST: Status: ACTIVE | Noted: 2017-09-25

## 2017-10-04 PROBLEM — Z86.39 HISTORY OF HYPOTHYROIDISM: Status: RESOLVED | Noted: 2017-09-25 | Resolved: 2017-10-04

## 2017-10-05 ENCOUNTER — MESSAGE (OUTPATIENT)
Age: 67
End: 2017-10-05

## 2017-10-05 LAB
ALBUMIN SERPL ELPH-MCNC: 4.1 G/DL
ALP BLD-CCNC: 64 U/L
ALT SERPL-CCNC: 21 U/L
ANION GAP SERPL CALC-SCNC: 17 MMOL/L
APPEARANCE: CLEAR
APTT BLD: 35.3 SEC
AST SERPL-CCNC: 23 U/L
BACTERIA UR CULT: NORMAL
BACTERIA: NEGATIVE
BASOPHILS # BLD AUTO: 0.04 K/UL
BASOPHILS NFR BLD AUTO: 0.7 %
BILIRUB SERPL-MCNC: 0.3 MG/DL
BILIRUBIN URINE: NEGATIVE
BLOOD URINE: NEGATIVE
BUN SERPL-MCNC: 15 MG/DL
CALCIUM SERPL-MCNC: 9.3 MG/DL
CHLORIDE SERPL-SCNC: 104 MMOL/L
CHOLEST SERPL-MCNC: 213 MG/DL
CHOLEST/HDLC SERPL: 4.8 RATIO
CO2 SERPL-SCNC: 19 MMOL/L
COLOR: ABNORMAL
CREAT SERPL-MCNC: 0.71 MG/DL
EOSINOPHIL # BLD AUTO: 0.24 K/UL
EOSINOPHIL NFR BLD AUTO: 4.4 %
GLUCOSE QUALITATIVE U: NORMAL MG/DL
GLUCOSE SERPL-MCNC: 96 MG/DL
HCT VFR BLD CALC: 40.1 %
HDLC SERPL-MCNC: 44 MG/DL
HGB BLD-MCNC: 13.3 G/DL
HYALINE CASTS: 5 /LPF
IMM GRANULOCYTES NFR BLD AUTO: 0.2 %
INR PPP: 0.92 RATIO
KETONES URINE: NEGATIVE
LDLC SERPL CALC-MCNC: 115 MG/DL
LEUKOCYTE ESTERASE URINE: NEGATIVE
LYMPHOCYTES # BLD AUTO: 2 K/UL
LYMPHOCYTES NFR BLD AUTO: 36.4 %
MAN DIFF?: NORMAL
MCHC RBC-ENTMCNC: 28.3 PG
MCHC RBC-ENTMCNC: 33.2 GM/DL
MCV RBC AUTO: 85.3 FL
MICROSCOPIC-UA: NORMAL
MONOCYTES # BLD AUTO: 0.37 K/UL
MONOCYTES NFR BLD AUTO: 6.7 %
NEUTROPHILS # BLD AUTO: 2.83 K/UL
NEUTROPHILS NFR BLD AUTO: 51.6 %
NITRITE URINE: NEGATIVE
PH URINE: 5
PLATELET # BLD AUTO: 194 K/UL
POTASSIUM SERPL-SCNC: 4.5 MMOL/L
PROT SERPL-MCNC: 7.1 G/DL
PROTEIN URINE: NEGATIVE MG/DL
PT BLD: 10.4 SEC
RBC # BLD: 4.7 M/UL
RBC # FLD: 14.7 %
RED BLOOD CELLS URINE: 4 /HPF
SODIUM SERPL-SCNC: 140 MMOL/L
SPECIFIC GRAVITY URINE: 1.03
SQUAMOUS EPITHELIAL CELLS: 7 /HPF
TRIGL SERPL-MCNC: 271 MG/DL
TSH SERPL-ACNC: 0.65 UIU/ML
UROBILINOGEN URINE: NORMAL MG/DL
WBC # FLD AUTO: 5.49 K/UL
WHITE BLOOD CELLS URINE: 4 /HPF

## 2017-10-09 VITALS
WEIGHT: 199.96 LBS | HEIGHT: 66 IN | HEART RATE: 98 BPM | OXYGEN SATURATION: 97 % | DIASTOLIC BLOOD PRESSURE: 76 MMHG | SYSTOLIC BLOOD PRESSURE: 130 MMHG | TEMPERATURE: 98 F | RESPIRATION RATE: 16 BRPM

## 2017-10-10 ENCOUNTER — INPATIENT (INPATIENT)
Facility: HOSPITAL | Age: 67
LOS: 3 days | Discharge: HOME HEALTH | DRG: 131 | End: 2017-10-14
Attending: SPECIALIST | Admitting: SPECIALIST
Payer: MEDICARE

## 2017-10-10 ENCOUNTER — APPOINTMENT (OUTPATIENT)
Dept: OTOLARYNGOLOGY | Facility: HOSPITAL | Age: 67
End: 2017-10-10

## 2017-10-10 DIAGNOSIS — Z90.710 ACQUIRED ABSENCE OF BOTH CERVIX AND UTERUS: Chronic | ICD-10-CM

## 2017-10-10 DIAGNOSIS — Z98.890 OTHER SPECIFIED POSTPROCEDURAL STATES: Chronic | ICD-10-CM

## 2017-10-10 LAB
ANION GAP SERPL CALC-SCNC: 19 MMOL/L — HIGH (ref 5–17)
BUN SERPL-MCNC: 15 MG/DL — SIGNIFICANT CHANGE UP (ref 7–23)
CALCIUM SERPL-MCNC: 9.1 MG/DL — SIGNIFICANT CHANGE UP (ref 8.4–10.5)
CHLORIDE SERPL-SCNC: 102 MMOL/L — SIGNIFICANT CHANGE UP (ref 96–108)
CK MB CFR SERPL CALC: 2.8 NG/ML — SIGNIFICANT CHANGE UP (ref 0–6.7)
CK SERPL-CCNC: 66 U/L — SIGNIFICANT CHANGE UP (ref 25–170)
CO2 SERPL-SCNC: 19 MMOL/L — LOW (ref 22–31)
CREAT SERPL-MCNC: 0.73 MG/DL — SIGNIFICANT CHANGE UP (ref 0.5–1.3)
GLUCOSE BLDC GLUCOMTR-MCNC: 128 MG/DL — HIGH (ref 70–99)
GLUCOSE BLDC GLUCOMTR-MCNC: 151 MG/DL — HIGH (ref 70–99)
GLUCOSE SERPL-MCNC: 189 MG/DL — HIGH (ref 70–99)
HCT VFR BLD CALC: 38.5 % — SIGNIFICANT CHANGE UP (ref 34.5–45)
HGB BLD-MCNC: 12.7 G/DL — SIGNIFICANT CHANGE UP (ref 11.5–15.5)
MAGNESIUM SERPL-MCNC: 1.7 MG/DL — SIGNIFICANT CHANGE UP (ref 1.6–2.6)
MCHC RBC-ENTMCNC: 27.2 PG — SIGNIFICANT CHANGE UP (ref 27–34)
MCHC RBC-ENTMCNC: 33 G/DL — SIGNIFICANT CHANGE UP (ref 32–36)
MCV RBC AUTO: 82.4 FL — SIGNIFICANT CHANGE UP (ref 80–100)
PHOSPHATE SERPL-MCNC: 3.7 MG/DL — SIGNIFICANT CHANGE UP (ref 2.5–4.5)
PLATELET # BLD AUTO: 192 K/UL — SIGNIFICANT CHANGE UP (ref 150–400)
POTASSIUM SERPL-MCNC: 4 MMOL/L — SIGNIFICANT CHANGE UP (ref 3.5–5.3)
POTASSIUM SERPL-SCNC: 4 MMOL/L — SIGNIFICANT CHANGE UP (ref 3.5–5.3)
RBC # BLD: 4.67 M/UL — SIGNIFICANT CHANGE UP (ref 3.8–5.2)
RBC # FLD: 14.1 % — SIGNIFICANT CHANGE UP (ref 10.3–16.9)
SODIUM SERPL-SCNC: 140 MMOL/L — SIGNIFICANT CHANGE UP (ref 135–145)
TROPONIN T SERPL-MCNC: <0.01 NG/ML — SIGNIFICANT CHANGE UP (ref 0–0.01)
WBC # BLD: 6.7 K/UL — SIGNIFICANT CHANGE UP (ref 3.8–10.5)
WBC # FLD AUTO: 6.7 K/UL — SIGNIFICANT CHANGE UP (ref 3.8–10.5)

## 2017-10-10 PROCEDURE — 81003 URINALYSIS AUTO W/O SCOPE: CPT

## 2017-10-10 PROCEDURE — 61618 REPAIR DURA: CPT | Mod: 59

## 2017-10-10 PROCEDURE — 84436 ASSAY OF TOTAL THYROXINE: CPT

## 2017-10-10 PROCEDURE — 99291 CRITICAL CARE FIRST HOUR: CPT | Mod: 24

## 2017-10-10 PROCEDURE — 20926: CPT

## 2017-10-10 PROCEDURE — 84443 ASSAY THYROID STIM HORMONE: CPT

## 2017-10-10 PROCEDURE — 87186 SC STD MICRODIL/AGAR DIL: CPT

## 2017-10-10 PROCEDURE — 62141 CRNOP SKULL DEFECT>5 CM DIAM: CPT

## 2017-10-10 PROCEDURE — 76937 US GUIDE VASCULAR ACCESS: CPT

## 2017-10-10 PROCEDURE — 87070 CULTURE OTHR SPECIMN AEROBIC: CPT

## 2017-10-10 PROCEDURE — 71045 X-RAY EXAM CHEST 1 VIEW: CPT

## 2017-10-10 PROCEDURE — 86140 C-REACTIVE PROTEIN: CPT

## 2017-10-10 PROCEDURE — 86901 BLOOD TYPING SEROLOGIC RH(D): CPT

## 2017-10-10 PROCEDURE — 36415 COLL VENOUS BLD VENIPUNCTURE: CPT

## 2017-10-10 PROCEDURE — 61606 RESECT/EXCISE CRANIAL LESION: CPT

## 2017-10-10 PROCEDURE — 80048 BASIC METABOLIC PNL TOTAL CA: CPT

## 2017-10-10 PROCEDURE — 80202 ASSAY OF VANCOMYCIN: CPT

## 2017-10-10 PROCEDURE — 96374 THER/PROPH/DIAG INJ IV PUSH: CPT

## 2017-10-10 PROCEDURE — 86900 BLOOD TYPING SEROLOGIC ABO: CPT

## 2017-10-10 PROCEDURE — 61591 INFRATEMPORAL APPROACH/SKULL: CPT | Mod: 62,RT

## 2017-10-10 PROCEDURE — 83735 ASSAY OF MAGNESIUM: CPT

## 2017-10-10 PROCEDURE — 99285 EMERGENCY DEPT VISIT HI MDM: CPT | Mod: 25

## 2017-10-10 PROCEDURE — 85027 COMPLETE CBC AUTOMATED: CPT

## 2017-10-10 PROCEDURE — 87040 BLOOD CULTURE FOR BACTERIA: CPT

## 2017-10-10 PROCEDURE — A9585: CPT

## 2017-10-10 PROCEDURE — 84100 ASSAY OF PHOSPHORUS: CPT

## 2017-10-10 PROCEDURE — 85652 RBC SED RATE AUTOMATED: CPT

## 2017-10-10 PROCEDURE — 86850 RBC ANTIBODY SCREEN: CPT

## 2017-10-10 PROCEDURE — 36569 INSJ PICC 5 YR+ W/O IMAGING: CPT

## 2017-10-10 PROCEDURE — 70553 MRI BRAIN STEM W/O & W/DYE: CPT

## 2017-10-10 RX ORDER — INSULIN LISPRO 100/ML
VIAL (ML) SUBCUTANEOUS EVERY 6 HOURS
Qty: 0 | Refills: 0 | Status: DISCONTINUED | OUTPATIENT
Start: 2017-10-10 | End: 2017-10-14

## 2017-10-10 RX ORDER — LACTOBACILLUS ACIDOPH-L.BULGARICUS 1 MILLION CELL CHEWABLE TABLET 1MM CELL
1 TABLET,CHEWABLE ORAL DAILY
Qty: 0 | Refills: 0 | Status: DISCONTINUED | OUTPATIENT
Start: 2017-10-10 | End: 2017-10-14

## 2017-10-10 RX ORDER — CEFTRIAXONE 500 MG/1
2 INJECTION, POWDER, FOR SOLUTION INTRAMUSCULAR; INTRAVENOUS EVERY 24 HOURS
Qty: 0 | Refills: 0 | Status: DISCONTINUED | OUTPATIENT
Start: 2017-10-11 | End: 2017-10-14

## 2017-10-10 RX ORDER — CEFTRIAXONE 500 MG/1
INJECTION, POWDER, FOR SOLUTION INTRAMUSCULAR; INTRAVENOUS
Qty: 0 | Refills: 0 | Status: DISCONTINUED | OUTPATIENT
Start: 2017-10-10 | End: 2017-10-14

## 2017-10-10 RX ORDER — ACETAMINOPHEN 500 MG
1000 TABLET ORAL ONCE
Qty: 0 | Refills: 0 | Status: COMPLETED | OUTPATIENT
Start: 2017-10-10 | End: 2017-10-10

## 2017-10-10 RX ORDER — FENTANYL CITRATE 50 UG/ML
12.5 INJECTION INTRAVENOUS
Qty: 0 | Refills: 0 | Status: DISCONTINUED | OUTPATIENT
Start: 2017-10-10 | End: 2017-10-14

## 2017-10-10 RX ORDER — ONDANSETRON 8 MG/1
4 TABLET, FILM COATED ORAL EVERY 6 HOURS
Qty: 0 | Refills: 0 | Status: DISCONTINUED | OUTPATIENT
Start: 2017-10-10 | End: 2017-10-14

## 2017-10-10 RX ORDER — CEFTRIAXONE 500 MG/1
2 INJECTION, POWDER, FOR SOLUTION INTRAMUSCULAR; INTRAVENOUS ONCE
Qty: 0 | Refills: 0 | Status: COMPLETED | OUTPATIENT
Start: 2017-10-10 | End: 2017-10-10

## 2017-10-10 RX ORDER — HEPARIN SODIUM 5000 [USP'U]/ML
5000 INJECTION INTRAVENOUS; SUBCUTANEOUS EVERY 8 HOURS
Qty: 0 | Refills: 0 | Status: DISCONTINUED | OUTPATIENT
Start: 2017-10-11 | End: 2017-10-14

## 2017-10-10 RX ORDER — LEVOTHYROXINE SODIUM 125 MCG
150 TABLET ORAL DAILY
Qty: 0 | Refills: 0 | Status: DISCONTINUED | OUTPATIENT
Start: 2017-10-10 | End: 2017-10-14

## 2017-10-10 RX ORDER — PANTOPRAZOLE SODIUM 20 MG/1
40 TABLET, DELAYED RELEASE ORAL DAILY
Qty: 0 | Refills: 0 | Status: DISCONTINUED | OUTPATIENT
Start: 2017-10-10 | End: 2017-10-14

## 2017-10-10 RX ORDER — DEXAMETHASONE 0.5 MG/5ML
10 ELIXIR ORAL EVERY 8 HOURS
Qty: 0 | Refills: 0 | Status: DISCONTINUED | OUTPATIENT
Start: 2017-10-10 | End: 2017-10-13

## 2017-10-10 RX ORDER — SODIUM CHLORIDE 9 MG/ML
1000 INJECTION, SOLUTION INTRAVENOUS
Qty: 0 | Refills: 0 | Status: DISCONTINUED | OUTPATIENT
Start: 2017-10-10 | End: 2017-10-11

## 2017-10-10 RX ADMIN — Medication 102 MILLIGRAM(S): at 22:13

## 2017-10-10 RX ADMIN — Medication 1000 MILLIGRAM(S): at 20:30

## 2017-10-10 RX ADMIN — Medication 30 MILLILITER(S): at 19:54

## 2017-10-10 RX ADMIN — PANTOPRAZOLE SODIUM 40 MILLIGRAM(S): 20 TABLET, DELAYED RELEASE ORAL at 20:48

## 2017-10-10 RX ADMIN — Medication 30 MILLILITER(S): at 23:58

## 2017-10-10 RX ADMIN — Medication 400 MILLIGRAM(S): at 23:58

## 2017-10-10 RX ADMIN — FENTANYL CITRATE 12.5 MICROGRAM(S): 50 INJECTION INTRAVENOUS at 21:30

## 2017-10-10 RX ADMIN — Medication 2: at 18:08

## 2017-10-10 RX ADMIN — FENTANYL CITRATE 12.5 MICROGRAM(S): 50 INJECTION INTRAVENOUS at 20:56

## 2017-10-10 RX ADMIN — FENTANYL CITRATE 12.5 MICROGRAM(S): 50 INJECTION INTRAVENOUS at 16:44

## 2017-10-10 RX ADMIN — SODIUM CHLORIDE 75 MILLILITER(S): 9 INJECTION, SOLUTION INTRAVENOUS at 22:17

## 2017-10-10 RX ADMIN — ONDANSETRON 4 MILLIGRAM(S): 8 TABLET, FILM COATED ORAL at 16:44

## 2017-10-10 RX ADMIN — Medication 400 MILLIGRAM(S): at 18:48

## 2017-10-10 RX ADMIN — CEFTRIAXONE 100 GRAM(S): 500 INJECTION, POWDER, FOR SOLUTION INTRAMUSCULAR; INTRAVENOUS at 18:03

## 2017-10-10 NOTE — H&P ADULT - NSHPPHYSICALEXAM_GEN_ALL_CORE
Constitutional: normal appearance, well groomed, well nourished, and in no acute distress.       Neck: no mass and no adenopathy.       Ear: external ears are normal bilaterally.   Ear:Tympanic Membrane: right TM intact with serous effusion.       Oral Cavity: tongue is normal, teeth are normal, gums are normal and palatine tonsils are normal.       Head/Face: no masses and lesions seen, face is symmetric . salivary glands are normal.     Eyes: ocular motility and gaze are normal, extraocular movements are normal and no nystagmus.     Respiratory: assessment of respiratory effort is normal.     Cardiovascular: auscultation of heart is normal.     Lymphatic: palpation of lymph nodes is normal.     Neurological: cranial nerves 2-12 intact.

## 2017-10-10 NOTE — PROGRESS NOTE ADULT - SUBJECTIVE AND OBJECTIVE BOX
=================================  NEUROCRITICAL CARE ATTENDING NOTE  =================================    RENE MENENDEZ   MRN-[id number]  Summary:  67y/F resented with R sided ear pain, on CT showed temporal and frontal pneumocephalus 0720.  Transferred from [facility] to [facility].  found to have strep pneumo bacteremia at OSH and meningitis, frontal empyema.  Discharged on  on home IV antibiotics (ceftri, flagyl, valacyclovir).  OR planned for 10/10/2017.  Overnight Events: Admitted to NSICU.    PAST MEDICAL & SURGICAL HISTORY: GERD, Mastoiditis Abdominal hernia Hypothyroid H/O myomectomy H/O hernia repair: incisional x 2 History of hysterectomy: partial  Allergies: PCN (hives), sulfa drugs (rash)  Home meds: lactobacillus, vit D3, prilosec 20mg daily, MVI, levothyroxine 150 ug daily     PHYSICAL EXAMINATION  T(C): 35.6 (10-10 @ 15:05), Max: 35.6 (10-10 @ 15:05) HR: 96 (10-10 @ 15:30) (96 - 98) BP: 129/64 (10-10 @ 15:05) (129/64 - 129/64) RR: 10 (10-10 @ 15:30) (10 - 11) SpO2: 97% (10-10 @ 15:30) (94% - 97%)  NEUROLOGIC EXAMINATION:  Patient is awake, alert, fully oriented, pupils 2-3mm equal and briskly reactive to light, EOMs intact, muscle strength 5/5 on all 4 extremities  GENERAL:  not intubated, not in cardiorespiratory distress  EENT: anicteric  CARDIOVASC:  (+) S1 S2, normal rate and regular rhythm  PULMONARY:  clear to auscultation bilaterally  ABDOMEN:  soft, nontender, with normoactive bowel sounds  EXTREMITIES:  no edema  SKIN:  no rash    LABS: post-op labs pending    Bacteriology:  CSF studies:  EEG:  Neuroimagin/06 CT maxillofacial: resolution of pneumocephalus, R tympanomastoid cavity remains nearly completely opacified; ?continued CSF leak through suspected tegmen defect    CT head: no recurrence of pneumocephalus, no abnormal intracranial enhancement  Other imaging:    MEDICATIONS: dexamethasone 10mg IV q8h lactobacillus levothyroxine 150 ug daily pantoprazole 40 IV daily     IV FLUIDS: LR@75cc/hr  DRIPS:  DIET: NPO  Lines:  Drains:    Wounds:    CODE STATUS:  full code                       GOALS OF CARE:  aggressive                      DISPOSITION:  ICU

## 2017-10-10 NOTE — H&P ADULT - HISTORY OF PRESENT ILLNESS
[id number] [id number] [id number] [id number] 66 F seen at [facility], transferred from [facility] after ENT was consulted for R otalgia and temporal and frontal fossa pneumocephalus on CT max-face on 7/20. Reported that she had been suffering from seasonal allergies for 2 weeks and using a neti-pot and saline spray to rinse her sinus. However, 2 days prior, she suddenly developed moderate/severe otalgia and headache on the right. Endorsed excessive blowing of nose during this period. Denies trauma. Neurosurgery and ID were consulted. She was found to have strep pneumo bacteremia at OSH and meningitis and possible frontal empyema on MRI with persistent fever. She also had a right nasal alar lesion that improved on bactroban ointment. She remained stable and was discharged on 7/28 on IV ceftriaxone, vanc, po flagyl and valcyclovir with PICC line to nursing facility for infusion.     [id number] [id number]   Interval History: Completed IV abx 8/30, seen by Dr. Schneider. Continued to have right sided CHL, seen by Dr. Gary, audiogram showed 20 dB ABG on right, normal of left. Otherwise doing ok, regaining her strength. Denied fever/chills, headaches, neck pain or clear rhinorrhea. Denied tinnitus, dizziness or vertigo.    Recently seen by Dr. Hilario for preop visit, here today with her friend for preop visit with Dr. Rey. OR planned for 10/10/17. Recently had cold but no clear rhinorrhea, fevers/chills, HA, neck pain/stiffness, c/o some nasal congestion lingering from cold. EifspBardhkk6Hmf FovbiCuzvauo030Tusgh [id number] DhusgCtngohe703Jzsdn IxxjeMbmnstw299Imx OiylyRtijpag257Qknjz [id number] [id number]

## 2017-10-10 NOTE — H&P ADULT - ASSESSMENT
A/P:  67F Middle cranial fossa exploration, CSF leak repair, mastoidectomy and right thigh fat graft  - SICU  - Decadron 10q8, taper after 2 days  - PPI  - bowel regiment  - NPO overnight   - RON mgmt  - pain control PRN  - anti emetics PRN   - skull base precautions: no nose blowing, sneeze with mouth open, no straining, no heavy lifting  - Bedrest for now   - SQH in AM   - SCDs   - NO PT

## 2017-10-10 NOTE — BRIEF OPERATIVE NOTE - PROCEDURE
<<-----Click on this checkbox to enter Procedure Mastoidectomy  10/10/2017  with fat graft from right thigh  Active  DGARBER  Middle cranial fossa craniectomy  10/10/2017  with repair of CSF leak  Active  DGARBER

## 2017-10-10 NOTE — PROGRESS NOTE ADULT - SUBJECTIVE AND OBJECTIVE BOX
ENT Post Op Check     67F s/p right middle cranial fossa exploration for repair of CSF leak, mastoidectomy, fat graft from right thigh.     Interval: Patient taken directly from OR to SICU. Doing well since surgery, complaining of pressure near drain site in the head. Denies nausea, chest pain, SOB. Endorses mild headache.     Exam:   NAD, AAOx3  Breathing comfortably  EOMI, PERRL  Face symmetric, SILT   Incision c/d/i   RON drain in scalp with sanguinous drainage   Strength symmetric in upper and lower extremities  Abdominal binder in place  R leg fat graft donor site soft, flat, dressing c/d/i  Dawson in place     Labs:   CBC: 6.7>12.7/38.5<192  BMP: 140/4.0 | 102/19 | 15/0.73 Glu 189  Mg 1.7, Phos 3.7    Assessment & Plan: 67F s/p right middle cranial fossa exploration for repair of CSF leak, mastoidectomy, fat graft from right thigh.   Neuro: pain control, patient has had issues with nausea related to dilaudid in the past, fentanyl ok if needed  HEENT: skull base precautions - no nose blowing, no bearing down, sneeze with mouth open, no straws   CV/Resp: no active issues  GI: clears tonight, advance tomorrow if tolerating without nausea, bowel regimen, PPI   Renal: dawson in place  ID: ceftriaxone  Activity: bedrest tonight, OOB tomorrow if no issues   PPx: SQH ok to start in am

## 2017-10-11 DIAGNOSIS — E03.9 HYPOTHYROIDISM, UNSPECIFIED: ICD-10-CM

## 2017-10-11 DIAGNOSIS — J45.909 UNSPECIFIED ASTHMA, UNCOMPLICATED: ICD-10-CM

## 2017-10-11 DIAGNOSIS — K21.9 GASTRO-ESOPHAGEAL REFLUX DISEASE WITHOUT ESOPHAGITIS: ICD-10-CM

## 2017-10-11 DIAGNOSIS — H70.90 UNSPECIFIED MASTOIDITIS, UNSPECIFIED EAR: ICD-10-CM

## 2017-10-11 LAB
ANION GAP SERPL CALC-SCNC: 15 MMOL/L — SIGNIFICANT CHANGE UP (ref 5–17)
BUN SERPL-MCNC: 12 MG/DL — SIGNIFICANT CHANGE UP (ref 7–23)
CALCIUM SERPL-MCNC: 9.2 MG/DL — SIGNIFICANT CHANGE UP (ref 8.4–10.5)
CHLORIDE SERPL-SCNC: 100 MMOL/L — SIGNIFICANT CHANGE UP (ref 96–108)
CHLORIDE UR-SCNC: 67 MMOL/L — SIGNIFICANT CHANGE UP
CO2 SERPL-SCNC: 22 MMOL/L — SIGNIFICANT CHANGE UP (ref 22–31)
CREAT ?TM UR-MCNC: 32 MG/DL — SIGNIFICANT CHANGE UP
CREAT SERPL-MCNC: 0.62 MG/DL — SIGNIFICANT CHANGE UP (ref 0.5–1.3)
GLUCOSE BLDC GLUCOMTR-MCNC: 127 MG/DL — HIGH (ref 70–99)
GLUCOSE BLDC GLUCOMTR-MCNC: 132 MG/DL — HIGH (ref 70–99)
GLUCOSE BLDC GLUCOMTR-MCNC: 135 MG/DL — HIGH (ref 70–99)
GLUCOSE SERPL-MCNC: 152 MG/DL — HIGH (ref 70–99)
HCT VFR BLD CALC: 35.7 % — SIGNIFICANT CHANGE UP (ref 34.5–45)
HGB BLD-MCNC: 12.4 G/DL — SIGNIFICANT CHANGE UP (ref 11.5–15.5)
MAGNESIUM SERPL-MCNC: 1.8 MG/DL — SIGNIFICANT CHANGE UP (ref 1.6–2.6)
MCHC RBC-ENTMCNC: 28.1 PG — SIGNIFICANT CHANGE UP (ref 27–34)
MCHC RBC-ENTMCNC: 34.7 G/DL — SIGNIFICANT CHANGE UP (ref 32–36)
MCV RBC AUTO: 81 FL — SIGNIFICANT CHANGE UP (ref 80–100)
OSMOLALITY UR: 380 MOSMOL/KG — SIGNIFICANT CHANGE UP (ref 100–650)
PHOSPHATE SERPL-MCNC: 4 MG/DL — SIGNIFICANT CHANGE UP (ref 2.5–4.5)
PLATELET # BLD AUTO: 182 K/UL — SIGNIFICANT CHANGE UP (ref 150–400)
POTASSIUM SERPL-MCNC: 4.1 MMOL/L — SIGNIFICANT CHANGE UP (ref 3.5–5.3)
POTASSIUM SERPL-SCNC: 4.1 MMOL/L — SIGNIFICANT CHANGE UP (ref 3.5–5.3)
RBC # BLD: 4.41 M/UL — SIGNIFICANT CHANGE UP (ref 3.8–5.2)
RBC # FLD: 14.1 % — SIGNIFICANT CHANGE UP (ref 10.3–16.9)
SODIUM SERPL-SCNC: 137 MMOL/L — SIGNIFICANT CHANGE UP (ref 135–145)
SODIUM UR-SCNC: 90 MMOL/L — SIGNIFICANT CHANGE UP
WBC # BLD: 8.6 K/UL — SIGNIFICANT CHANGE UP (ref 3.8–10.5)
WBC # FLD AUTO: 8.6 K/UL — SIGNIFICANT CHANGE UP (ref 3.8–10.5)

## 2017-10-11 PROCEDURE — 99291 CRITICAL CARE FIRST HOUR: CPT | Mod: 24

## 2017-10-11 PROCEDURE — 99233 SBSQ HOSP IP/OBS HIGH 50: CPT | Mod: GC

## 2017-10-11 RX ORDER — DOCUSATE SODIUM 100 MG
100 CAPSULE ORAL THREE TIMES A DAY
Qty: 0 | Refills: 0 | Status: DISCONTINUED | OUTPATIENT
Start: 2017-10-11 | End: 2017-10-14

## 2017-10-11 RX ORDER — ACETAMINOPHEN 500 MG
1000 TABLET ORAL ONCE
Qty: 0 | Refills: 0 | Status: COMPLETED | OUTPATIENT
Start: 2017-10-11 | End: 2017-10-11

## 2017-10-11 RX ORDER — ACETAMINOPHEN 500 MG
1000 TABLET ORAL ONCE
Qty: 0 | Refills: 0 | Status: DISCONTINUED | OUTPATIENT
Start: 2017-10-11 | End: 2017-10-14

## 2017-10-11 RX ORDER — MAGNESIUM SULFATE 500 MG/ML
1 VIAL (ML) INJECTION ONCE
Qty: 0 | Refills: 0 | Status: COMPLETED | OUTPATIENT
Start: 2017-10-11 | End: 2017-10-11

## 2017-10-11 RX ORDER — FAMOTIDINE 10 MG/ML
20 INJECTION INTRAVENOUS ONCE
Qty: 0 | Refills: 0 | Status: COMPLETED | OUTPATIENT
Start: 2017-10-11 | End: 2017-10-11

## 2017-10-11 RX ORDER — SENNA PLUS 8.6 MG/1
2 TABLET ORAL AT BEDTIME
Qty: 0 | Refills: 0 | Status: DISCONTINUED | OUTPATIENT
Start: 2017-10-11 | End: 2017-10-14

## 2017-10-11 RX ADMIN — FAMOTIDINE 20 MILLIGRAM(S): 10 INJECTION INTRAVENOUS at 00:18

## 2017-10-11 RX ADMIN — HEPARIN SODIUM 5000 UNIT(S): 5000 INJECTION INTRAVENOUS; SUBCUTANEOUS at 21:14

## 2017-10-11 RX ADMIN — SENNA PLUS 2 TABLET(S): 8.6 TABLET ORAL at 21:14

## 2017-10-11 RX ADMIN — LACTOBACILLUS ACIDOPH-L.BULGARICUS 1 MILLION CELL CHEWABLE TABLET 1 TABLET(S): at 12:32

## 2017-10-11 RX ADMIN — PANTOPRAZOLE SODIUM 40 MILLIGRAM(S): 20 TABLET, DELAYED RELEASE ORAL at 12:30

## 2017-10-11 RX ADMIN — FENTANYL CITRATE 12.5 MICROGRAM(S): 50 INJECTION INTRAVENOUS at 03:17

## 2017-10-11 RX ADMIN — Medication 100 MILLIGRAM(S): at 21:14

## 2017-10-11 RX ADMIN — Medication 102 MILLIGRAM(S): at 06:06

## 2017-10-11 RX ADMIN — Medication 1000 MILLIGRAM(S): at 16:49

## 2017-10-11 RX ADMIN — Medication 1000 MILLIGRAM(S): at 00:45

## 2017-10-11 RX ADMIN — Medication 150 MICROGRAM(S): at 06:06

## 2017-10-11 RX ADMIN — CEFTRIAXONE 100 GRAM(S): 500 INJECTION, POWDER, FOR SOLUTION INTRAMUSCULAR; INTRAVENOUS at 18:10

## 2017-10-11 RX ADMIN — FENTANYL CITRATE 12.5 MICROGRAM(S): 50 INJECTION INTRAVENOUS at 06:25

## 2017-10-11 RX ADMIN — FENTANYL CITRATE 12.5 MICROGRAM(S): 50 INJECTION INTRAVENOUS at 20:58

## 2017-10-11 RX ADMIN — HEPARIN SODIUM 5000 UNIT(S): 5000 INJECTION INTRAVENOUS; SUBCUTANEOUS at 13:33

## 2017-10-11 RX ADMIN — HEPARIN SODIUM 5000 UNIT(S): 5000 INJECTION INTRAVENOUS; SUBCUTANEOUS at 06:06

## 2017-10-11 RX ADMIN — FENTANYL CITRATE 12.5 MICROGRAM(S): 50 INJECTION INTRAVENOUS at 07:00

## 2017-10-11 RX ADMIN — Medication 100 GRAM(S): at 07:21

## 2017-10-11 RX ADMIN — Medication 400 MILLIGRAM(S): at 16:24

## 2017-10-11 RX ADMIN — Medication 102 MILLIGRAM(S): at 13:33

## 2017-10-11 RX ADMIN — FENTANYL CITRATE 12.5 MICROGRAM(S): 50 INJECTION INTRAVENOUS at 04:00

## 2017-10-11 RX ADMIN — Medication 100 MILLIGRAM(S): at 13:34

## 2017-10-11 RX ADMIN — Medication 102 MILLIGRAM(S): at 21:14

## 2017-10-11 NOTE — PROGRESS NOTE ADULT - SUBJECTIVE AND OBJECTIVE BOX
ENT [facility] DAILY PROGRESS NOTE    67F s/p right middle cranial fossa exploration for repair of CSF leak, mastoidectomy, fat graft from right thigh on 10/11.     10/10: Patient taken directly from OR to SICU. Doing well since surgery, complaining of pressure near drain site in the head. Denies nausea, chest pain, SOB. Endorses mild headache.   10/11: Drain pulled out slightly overnight while repositioning, lost suction, hooked up to low continuous wall suction. Complaining of pain in the head, has negative reaction to multiple pain medications in the past. Tolerating clears. Has been on bedrest since OR.       Allergies    penicillin (Hives)  sulfa drugs (Rash)    Intolerances    MEDICATIONS:  Antiinfectives:   cefTRIAXone   IVPB      cefTRIAXone   IVPB 2 Gram(s) IV Intermittent every 24 hours    IV fluids:  lactated ringers. 1000 milliLiter(s) IV Continuous <Continuous>    Hematologic/Anticoagulation:  heparin  Injectable 5000 Unit(s) SubCutaneous every 8 hours    Pain medications/Neuro:  fentaNYL    Injectable 12.5 MICROGram(s) IV Push every 3 hours PRN  ondansetron Injectable 4 milliGRAM(s) IV Push every 6 hours PRN    Endocrine Medications:   dexamethasone  IVPB 10 milliGRAM(s) IV Intermittent every 8 hours  insulin lispro (HumaLOG) corrective regimen sliding scale   SubCutaneous every 6 hours  levothyroxine 150 MICROGram(s) Oral daily    All other standing medications:   lactobacillus acidophilus and bulgaricus Chewable 1 Tablet(s) Chew daily  pantoprazole  Injectable 40 milliGRAM(s) IV Push daily    All other PRN medications:  aluminum hydroxide/magnesium hydroxide/simethicone Suspension 30 milliLiter(s) Oral every 4 hours PRN      Vital Signs Last 24 Hrs  T(C): 36.6 (11 Oct 2017 06:03), Max: 36.7 (10 Oct 2017 21:56)  T(F): 97.8 (11 Oct 2017 06:03), Max: 98 (10 Oct 2017 21:56)  HR: 106 (11 Oct 2017 08:00) (96 - 114)  BP: 117/64 (11 Oct 2017 08:00) (104/66 - 131/69)  BP(mean): 77 (11 Oct 2017 08:00) (76 - 94)  RR: 14 (11 Oct 2017 08:00) (10 - 27)  SpO2: 95% (11 Oct 2017 08:00) (92% - 99%)      10-10 @ 07:01  -  10-11 @ 07:00  --------------------------------------------------------  IN:    IV PiggyBack: 300 mL    lactated ringers.: 1069 mL  Total IN: 1369 mL    OUT:    Drain: 75 mL    Indwelling Catheter - Urethral: 2940 mL  Total OUT: 3015 mL    Total NET: -1646 mL      10-11 @ 07:01  -  10-11 @ 08:09  --------------------------------------------------------  IN:    lactated ringers.: 75 mL  Total IN: 75 mL    OUT:    Indwelling Catheter - Urethral: 115 mL  Total OUT: 115 mL    Total NET: -40 mL      PHYSICAL EXAM:    ENT EXAM-   AAOx3, slightly uncomfortable appearing  Nonlabored breathing   EOMI, PERRL  Face symmetric, SILT   Incision c/d/i   RON drain in scalp with sanguinous drainage, advanced slightly and positioned with tegaderm, holding suction   Strength symmetric in upper and lower extremities  Abdominal binder in place  R leg fat graft donor site soft, flat, dressing c/d/i  Dawson in place     MULTISYSTEM EXAM-  Neuro/Psych:  A&O x 3.  Mood stable.    Pulm:  No dyspnea, non-labored breathing  Cardiovascular: Sinus tach (low 100s), regular rhythm. No peripheral edema.  Skin:  No rash or lesions on exposed skin of head/neck    LABS:  CBC-                        12.4   8.6   )-----------( 182      ( 11 Oct 2017 04:05 )             35.7     BMP/CMP-  11 Oct 2017 04:05    137    |  100    |  12     ----------------------------<  152    4.1     |  22     |  0.62     Ca    9.2        11 Oct 2017 04:05  Phos  4.0       11 Oct 2017 04:05  Mg     1.8       11 Oct 2017 04:05      Coagulation Studies-    Endocrine Panel-  Calcium, Total Serum: 9.2 mg/dL (10-11 @ 04:05)  Calcium, Total Serum: 9.1 mg/dL (10-10 @ 15:43)    & ADDITIONAL STUDIES:    Assessment & Plan: 67F s/p right middle cranial fossa exploration for repair of CSF leak, mastoidectomy, fat graft from right thigh.     Neuro: pain control, patient has had issues with pain meds in the past (dilaudid - dizziness, oxycodone - itching, morphine - nausea/vomiting after prolonged use), has done well with vicodin after surgery, would try hydrocodone and IV morhpine or fentanyl for breakthrough, no NSAIDs   HEENT: skull base precautions - no nose blowing, no bearing down, sneeze with mouth open, no straws   CV/Resp: no active issues  GI: clear liquids, pleases start bowel regimen, PPI   Renal: dawson in place  ID: ceftriaxone  Activity: OOBTC, no PT yet   PPx: SQH

## 2017-10-11 NOTE — PROGRESS NOTE ADULT - SUBJECTIVE AND OBJECTIVE BOX
=================================  NEUROCRITICAL CARE ATTENDING NOTE  =================================    RENE MENENDEZ   MRN-[id number]  Summary:  67y/F resented with R sided ear pain, on CT showed temporal and frontal pneumocephalus 0720.  Transferred from [facility] to [facility].  found to have strep pneumo bacteremia at OSH and meningitis, frontal empyema.  Discharged on  on home IV antibiotics (ceftri, flagyl, valacyclovir).  OR planned for 10/10/2017.  Overnight Events: No significant events overnight    PAST MEDICAL & SURGICAL HISTORY: GERD, Mastoiditis Abdominal hernia Hypothyroid H/O myomectomy H/O hernia repair: incisional x 2 History of hysterectomy: partial  Allergies: PCN (hives), sulfa drugs (rash)  Home meds: lactobacillus, vit D3, prilosec 20mg daily, MVI, levothyroxine 150 ug daily     PHYSICAL EXAMINATION  T(C): 36.6 (10-11 @ 06:03), Max: 36.7 (10-10 @ 21:56) HR: 108 (10-11 @ 07:00) (96 - 114) BP: 119/63 (10-11 @ 07:00) (104/66 - 131/69) RR: 13 (10-11 @ 07:00) (10 - 27) SpO2: 93% (10-11 @ 07:00) (92% - 99%)  NEUROLOGIC EXAMINATION:  Patient is awake, alert, fully oriented, pupils 2-3mm equal and briskly reactive to light, EOMs intact, muscle strength 5/5 on all 4 extremities  GENERAL:  not intubated, not in cardiorespiratory distress  EENT: anicteric  CARDIOVASC:  (+) S1 S2, tachycardic and regular rhythm  PULMONARY:  clear to auscultation bilaterally  ABDOMEN:  soft, nontender, with normoactive bowel sounds  EXTREMITIES:  no edema  SKIN:  no rash    LABS:  CAPILLARY BLOOD GLUCOSE 132 (11 Oct 2017 05:54) 128 (10 Oct 2017 23:19) 151 (10 Oct 2017 17:15)  132 128 151                        12.4   8.6   )-----------( 182      ( 11 Oct 2017 04:05 )             35.7     137  |  100  |  12  ----------------------------<  152<H>  4.1   |  22  |  0.62    Ca    9.2      11 Oct 2017 04:05  Phos  4.0     10-11  Mg     1.8     10-11    10-10 @ 07:01  -  10-11 @ 07:00  IN: 1369 mL / OUT: 3015 mL / NET: -1646 mL    Bacteriology:  CSF studies:  EEG:  Neuroimagin/06 CT maxillofacial: resolution of pneumocephalus, R tympanomastoid cavity remains nearly completely opacified; ?continued CSF leak through suspected tegmen defect    CT head: no recurrence of pneumocephalus, no abnormal intracranial enhancement  Other imaging:    MEDICATIONS: maalox ceftriaxone 2g IV q24h dexamethasone 10mg IV q8h fentanyl 12.5 q3h PRN SQH q8h mod ISS lactobacillus levothyroxine 150mg daily pantoprazole 40 IV daily     IV FLUIDS: LR@75cc/hr  DRIPS:  DIET: NPO  Lines:  Drains:    Wounds:    CODE STATUS:  full code                       GOALS OF CARE:  aggressive                      DISPOSITION:  ICU =================================  NEUROCRITICAL CARE ATTENDING NOTE  =================================    RENE MENENDEZ   MRN-[id number]  Summary:  67y/F resented with R sided ear pain, on CT showed temporal and frontal pneumocephalus 0720.  Transferred from [facility] to [facility].  found to have strep pneumo bacteremia at OSH and meningitis, frontal empyema.  Discharged on  on home IV antibiotics (ceftri, flagyl, valacyclovir).  OR planned for 10/10/2017.  Overnight Events: No significant events overnight    PAST MEDICAL & SURGICAL HISTORY: GERD, Mastoiditis Abdominal hernia Hypothyroid H/O myomectomy H/O hernia repair: incisional x 2 History of hysterectomy: partial  Allergies: PCN (hives), sulfa drugs (rash)  Home meds: lactobacillus, vit D3, prilosec 20mg daily, MVI, levothyroxine 150 ug daily     PHYSICAL EXAMINATION  T(C): 36.6 (10-11 @ 06:03), Max: 36.7 (10-10 @ 21:56) HR: 108 (10-11 @ 07:00) (96 - 114) BP: 119/63 (10-11 @ 07:00) (104/66 - 131/69) RR: 13 (10-11 @ 07:00) (10 - 27) SpO2: 93% (10-11 @ 07:00) (92% - 99%)  NEUROLOGIC EXAMINATION:  Patient is awake, alert, fully oriented, pupils 2-3mm equal and briskly reactive to light, EOMs intact, muscle strength 5/5 on all 4 extremities  GENERAL:  not intubated, not in cardiorespiratory distress  EENT: anicteric  CARDIOVASC:  (+) S1 S2, tachycardic and regular rhythm  PULMONARY:  clear to auscultation bilaterally  ABDOMEN:  soft, nontender, with normoactive bowel sounds  EXTREMITIES:  no edema  SKIN:  no rash    LABS:  CAPILLARY BLOOD GLUCOSE 132 (11 Oct 2017 05:54) 128 (10 Oct 2017 23:19) 151 (10 Oct 2017 17:15)  132 128 151                        12.4   8.6   )-----------( 182      ( 11 Oct 2017 04:05 )             35.7     137  |  100  |  12  ----------------------------<  152<H>  4.1   |  22  |  0.62    Ca    9.2      11 Oct 2017 04:05  Phos  4.0     10-11  Mg     1.8     10-11    10-10 @ 07:01  -  10-11 @ 07:00  IN: 1369 mL / OUT: 3015 mL / NET: -1646 mL    Bacteriology:  CSF studies:  EEG:  Neuroimagin/06 CT maxillofacial: resolution of pneumocephalus, R tympanomastoid cavity remains nearly completely opacified; ?continued CSF leak through suspected tegmen defect    CT head: no recurrence of pneumocephalus, no abnormal intracranial enhancement  Other imaging:    MEDICATIONS: maalox ceftriaxone 2g IV q24h dexamethasone 10mg IV q8h fentanyl 12.5 q3h PRN SQH q8h mod ISS lactobacillus levothyroxine 150mg daily pantoprazole 40 IV daily     IV FLUIDS: IVL   DRIPS:  DIET: clears  Lines:  Drains:   RON 75cc/24h  Wounds:    CODE STATUS:  full code                       GOALS OF CARE:  aggressive                      DISPOSITION:  ICU

## 2017-10-11 NOTE — PROGRESS NOTE ADULT - ASSESSMENT
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 Rey); h/o pneumocephalus, h/o meningitis; GERD, hypothyroidism    PLAN:   NEURO: neurochecks q1h, PRN pain meds with tylenol  CSF leak s/p repair: WOF for rhinorrhea; steroid taper as per ENT  REHAB:  physical therapy evaluation and management    EARLY MOB:  HOB up    PULM:  PRN O2 support to keep sats >/=92%, NO incentive spirometry  CARDIO:  SBP goal 100-150mm Hg  ENDO:  Blood sugar goals 140-180 mg/dL, continue insulin sliding scale, continue thyroid supplement at home doses  GI:  PPI for GI prophylaxis while on steroids, also has GERD (home meds)  DIET: NPO  RENAL:  IVF while NPO  HEM/ONC: check post-op Hb  VTE Prophylaxis: SCDs only, no DVT chemoprophylaxis for now as patient is high risk for bleed (fresh post-op)  ID: afebrile, f/u post-op CBC  Social: will update family    Patient at high risk for neurological deterioration or death due to:  meningitis, intracranial bleed, seizures, DVT / PE, aspiration.  Critical care time, excluding procedures: 60 minutes. 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 Rey); h/o pneumocephalus, h/o meningitis; GERD, hypothyroidism    PLAN:   NEURO: neurochecks q4h, VSQ2, PRN pain meds with tylenol, norco, fentanyl  CSF leak s/p repair: WOF for rhinorrhea; steroid taper as per ENT  REHAB:  physical therapy evaluation and management    EARLY MOB:  OOB to chair ambulate if ok with ENT    PULM:  Room air  CARDIO:  SBP goal 100-150mm Hg  ENDO:  Blood sugar goals 140-180 mg/dL, continue insulin sliding scale, continue thyroid supplement at home doses  GI:  PPI for GI prophylaxis while on steroids, also has GERD (home meds); maalox  DIET: advance as tolerated   RENAL:  IVL   HEM/ONC: Hb stable  VTE Prophylaxis: SCDs, SQH today  ID: afebrile, ceftriaxone as per ENT  Social: will update family    Patient at high risk for neurological deterioration or death due to:  meningitis, intracranial bleed, seizures, DVT / PE, aspiration.  Critical care time, excluding procedures: 60 minutes.

## 2017-10-11 NOTE — PROGRESS NOTE ADULT - SUBJECTIVE AND OBJECTIVE BOX
Interval Events: reviewed  Patient seen and examined at bedside.    Patient is a 67y old  Female who presents with a chief complaint of CSF Leak Repair (10 Oct 2017 15:09)  she had a bad night. Not sleep. She had to keep her head up and was uncomfortable. She feels better now    PAST MEDICAL & SURGICAL HISTORY:  Acid reflux  Mastoiditis  Abdominal hernia  Hypothyroid  H/O myomectomy  H/O hernia repair: incisional x 2  History of hysterectomy: partial      MEDICATIONS:  Pulmonary:    Antimicrobials:  cefTRIAXone   IVPB      cefTRIAXone   IVPB 2 Gram(s) IV Intermittent every 24 hours    Anticoagulants:  heparin  Injectable 5000 Unit(s) SubCutaneous every 8 hours    Cardiac:      Allergies    penicillin (Hives)  sulfa drugs (Rash)    Intolerances        Vital Signs Last 24 Hrs  T(C): 36.9 (11 Oct 2017 17:55), Max: 36.9 (11 Oct 2017 17:55)  T(F): 98.4 (11 Oct 2017 17:55), Max: 98.4 (11 Oct 2017 17:55)  HR: 102 (11 Oct 2017 21:00) (96 - 114)  BP: 112/62 (11 Oct 2017 21:00) (107/64 - 124/64)  BP(mean): 72 (11 Oct 2017 21:00) (72 - 88)  RR: 16 (11 Oct 2017 21:00) (10 - 26)  SpO2: 94% (11 Oct 2017 21:00) (91% - 95%)    10-10 @ 07:01  -  10-11 @ 07:00  --------------------------------------------------------  IN: 1369 mL / OUT: 3015 mL / NET: -1646 mL    10-11 @ 07:01  -  10-11 @ 21:32  --------------------------------------------------------  IN: 625 mL / OUT: 1290 mL / NET: -665 mL          LABS:      CBC Full  -  ( 11 Oct 2017 04:05 )  WBC Count : 8.6 K/uL  Hemoglobin : 12.4 g/dL  Hematocrit : 35.7 %  Platelet Count - Automated : 182 K/uL  Mean Cell Volume : 81.0 fL  Mean Cell Hemoglobin : 28.1 pg  Mean Cell Hemoglobin Concentration : 34.7 g/dL  Auto Neutrophil # : x  Auto Lymphocyte # : x  Auto Monocyte # : x  Auto Eosinophil # : x  Auto Basophil # : x  Auto Neutrophil % : x  Auto Lymphocyte % : x  Auto Monocyte % : xshow  Auto Eosinophil % : x  Auto Basophil % : x    10-11    137  |  100  |  12  ----------------------------<  152<H>  4.1   |  22  |  0.62    Ca    9.2      11 Oct 2017 04:05  Phos  4.0     10-11  Mg     1.8     10-11            < from: Xray Chest 1 View AP- PORTABLE-Urgent (07.26.17 @ 10:55) >    EXAM:  XR CHEST 1 VIEW PORT URGENT                          PROCEDURE DATE:  07/26/2017                     INTERPRETATION:  AP Portable CXR     Indication: PICC line placement    Prior: None.    AP Portable view of the chest is submitted. A left approach PICC is in   place with its tip overlying the SVC. Cardiac size is normal.. The   pulmonary vasculature is within normal limits. There is no focal opacity   or consolidation. Linear atelectasis is seen in the left mid to lower   lung zone. There is no pneumothorax. Mild degenerative changes of the   spine are noted.      Impression: Left approach PICC in place with its tip overlying the SVC.   No pneumothorax.    < end of copied text >  < from: Xray Chest 1 View AP- PORTABLE-Urgent (07.26.17 @ 10:55) >    EXAM:  XR CHEST 1 VIEW PORT URGENT                          PROCEDURE DATE:  07/26/2017                     INTERPRETATION:  AP Portable CXR     Indication: PICC line placement    Prior: None.    AP Portable view of the chest is submitted. A left approach PICC is in   place with its tip overlying the SVC. Cardiac size is normal.. The   pulmonary vasculature is within normal limits. There is no focal opacity   or consolidation. Linear atelectasis is seen in the left mid to lower   lung zone. There is no pneumothorax. Mild degenerative changes of the   spine are noted.      Impression: Left approach PICC in place with its tip overlying the SVC.   No pneumothorax.    < end of copied text >                RADIOLOGY & ADDITIONAL STUDIES (The following images were personally reviewed):  Self:                               No  Urine output:               Yes        DVT prophylaxis:         Yes         Flattus:                          Yes        Bowel movement:         No

## 2017-10-12 LAB
ANION GAP SERPL CALC-SCNC: 15 MMOL/L — SIGNIFICANT CHANGE UP (ref 5–17)
BUN SERPL-MCNC: 15 MG/DL — SIGNIFICANT CHANGE UP (ref 7–23)
CALCIUM SERPL-MCNC: 9.4 MG/DL — SIGNIFICANT CHANGE UP (ref 8.4–10.5)
CHLORIDE SERPL-SCNC: 102 MMOL/L — SIGNIFICANT CHANGE UP (ref 96–108)
CO2 SERPL-SCNC: 23 MMOL/L — SIGNIFICANT CHANGE UP (ref 22–31)
CREAT SERPL-MCNC: 0.62 MG/DL — SIGNIFICANT CHANGE UP (ref 0.5–1.3)
GLUCOSE BLDC GLUCOMTR-MCNC: 110 MG/DL — HIGH (ref 70–99)
GLUCOSE BLDC GLUCOMTR-MCNC: 117 MG/DL — HIGH (ref 70–99)
GLUCOSE BLDC GLUCOMTR-MCNC: 117 MG/DL — HIGH (ref 70–99)
GLUCOSE BLDC GLUCOMTR-MCNC: 124 MG/DL — HIGH (ref 70–99)
GLUCOSE SERPL-MCNC: 129 MG/DL — HIGH (ref 70–99)
HCT VFR BLD CALC: 37.8 % — SIGNIFICANT CHANGE UP (ref 34.5–45)
HGB BLD-MCNC: 12 G/DL — SIGNIFICANT CHANGE UP (ref 11.5–15.5)
MAGNESIUM SERPL-MCNC: 2.3 MG/DL — SIGNIFICANT CHANGE UP (ref 1.6–2.6)
MCHC RBC-ENTMCNC: 26.8 PG — LOW (ref 27–34)
MCHC RBC-ENTMCNC: 31.7 G/DL — LOW (ref 32–36)
MCV RBC AUTO: 84.6 FL — SIGNIFICANT CHANGE UP (ref 80–100)
PHOSPHATE SERPL-MCNC: 3.9 MG/DL — SIGNIFICANT CHANGE UP (ref 2.5–4.5)
PLATELET # BLD AUTO: 239 K/UL — SIGNIFICANT CHANGE UP (ref 150–400)
POTASSIUM SERPL-MCNC: 4.7 MMOL/L — SIGNIFICANT CHANGE UP (ref 3.5–5.3)
POTASSIUM SERPL-SCNC: 4.7 MMOL/L — SIGNIFICANT CHANGE UP (ref 3.5–5.3)
RBC # BLD: 4.47 M/UL — SIGNIFICANT CHANGE UP (ref 3.8–5.2)
RBC # FLD: 14.4 % — SIGNIFICANT CHANGE UP (ref 10.3–16.9)
SODIUM SERPL-SCNC: 140 MMOL/L — SIGNIFICANT CHANGE UP (ref 135–145)
WBC # BLD: 10.2 K/UL — SIGNIFICANT CHANGE UP (ref 3.8–10.5)
WBC # FLD AUTO: 10.2 K/UL — SIGNIFICANT CHANGE UP (ref 3.8–10.5)

## 2017-10-12 PROCEDURE — 99233 SBSQ HOSP IP/OBS HIGH 50: CPT | Mod: GC

## 2017-10-12 RX ORDER — MAGNESIUM HYDROXIDE 400 MG/1
30 TABLET, CHEWABLE ORAL DAILY
Qty: 0 | Refills: 0 | Status: DISCONTINUED | OUTPATIENT
Start: 2017-10-12 | End: 2017-10-14

## 2017-10-12 RX ADMIN — Medication 100 MILLIGRAM(S): at 23:35

## 2017-10-12 RX ADMIN — FENTANYL CITRATE 12.5 MICROGRAM(S): 50 INJECTION INTRAVENOUS at 08:26

## 2017-10-12 RX ADMIN — HEPARIN SODIUM 5000 UNIT(S): 5000 INJECTION INTRAVENOUS; SUBCUTANEOUS at 06:42

## 2017-10-12 RX ADMIN — LACTOBACILLUS ACIDOPH-L.BULGARICUS 1 MILLION CELL CHEWABLE TABLET 1 TABLET(S): at 12:39

## 2017-10-12 RX ADMIN — FENTANYL CITRATE 12.5 MICROGRAM(S): 50 INJECTION INTRAVENOUS at 07:49

## 2017-10-12 RX ADMIN — HEPARIN SODIUM 5000 UNIT(S): 5000 INJECTION INTRAVENOUS; SUBCUTANEOUS at 15:34

## 2017-10-12 RX ADMIN — FENTANYL CITRATE 12.5 MICROGRAM(S): 50 INJECTION INTRAVENOUS at 23:36

## 2017-10-12 RX ADMIN — Medication 100 MILLIGRAM(S): at 06:42

## 2017-10-12 RX ADMIN — FENTANYL CITRATE 12.5 MICROGRAM(S): 50 INJECTION INTRAVENOUS at 14:32

## 2017-10-12 RX ADMIN — SENNA PLUS 2 TABLET(S): 8.6 TABLET ORAL at 23:36

## 2017-10-12 RX ADMIN — PANTOPRAZOLE SODIUM 40 MILLIGRAM(S): 20 TABLET, DELAYED RELEASE ORAL at 12:39

## 2017-10-12 RX ADMIN — HEPARIN SODIUM 5000 UNIT(S): 5000 INJECTION INTRAVENOUS; SUBCUTANEOUS at 23:35

## 2017-10-12 RX ADMIN — Medication 100 MILLIGRAM(S): at 15:34

## 2017-10-12 RX ADMIN — Medication 102 MILLIGRAM(S): at 06:42

## 2017-10-12 RX ADMIN — FENTANYL CITRATE 12.5 MICROGRAM(S): 50 INJECTION INTRAVENOUS at 14:50

## 2017-10-12 RX ADMIN — CEFTRIAXONE 100 GRAM(S): 500 INJECTION, POWDER, FOR SOLUTION INTRAMUSCULAR; INTRAVENOUS at 18:17

## 2017-10-12 RX ADMIN — Medication 102 MILLIGRAM(S): at 23:36

## 2017-10-12 RX ADMIN — Medication 102 MILLIGRAM(S): at 15:15

## 2017-10-12 RX ADMIN — Medication 150 MICROGRAM(S): at 06:42

## 2017-10-12 RX ADMIN — MAGNESIUM HYDROXIDE 30 MILLILITER(S): 400 TABLET, CHEWABLE ORAL at 15:35

## 2017-10-12 NOTE — PROGRESS NOTE ADULT - SUBJECTIVE AND OBJECTIVE BOX
ENT [facility] DAILY PROGRESS NOTE      67F s/p right middle cranial fossa exploration for repair of CSF leak, mastoidectomy, fat graft from right thigh on 10/11.     10/10: Patient taken directly from OR to SICU. Doing well since surgery, complaining of pressure near drain site in the head. Denies nausea, chest pain, SOB. Endorses mild headache.   10/11: Drain pulled out slightly overnight while repositioning, lost suction, hooked up to low continuous wall suction. Complaining of pain in the head, has negative reaction to multiple pain medications in the past. Tolerating clears. Has been on bedrest since OR.   10/12: No acute events. Transferred to step down. Has been OOB to chair. Complaining of some grogginess and pressure around drain site.       Allergies    penicillin (Hives)  sulfa drugs (Rash)    Intolerances    MEDICATIONS:  Antiinfectives:   cefTRIAXone   IVPB      cefTRIAXone   IVPB 2 Gram(s) IV Intermittent every 24 hours    IV fluids:    Hematologic/Anticoagulation:  heparin  Injectable 5000 Unit(s) SubCutaneous every 8 hours    Pain medications/Neuro:  acetaminophen  IVPB. 1000 milliGRAM(s) IV Intermittent once PRN  fentaNYL    Injectable 12.5 MICROGram(s) IV Push every 3 hours PRN  HYDROcodone  5 mG/acetaminophen 325 mG 1 Tablet(s) Oral every 4 hours PRN  ondansetron Injectable 4 milliGRAM(s) IV Push every 6 hours PRN    Endocrine Medications:   dexamethasone  IVPB 10 milliGRAM(s) IV Intermittent every 8 hours  insulin lispro (HumaLOG) corrective regimen sliding scale   SubCutaneous every 6 hours  levothyroxine 150 MICROGram(s) Oral daily    All other standing medications:   docusate sodium 100 milliGRAM(s) Oral three times a day  lactobacillus acidophilus and bulgaricus Chewable 1 Tablet(s) Chew daily  magnesium hydroxide Suspension 30 milliLiter(s) Oral daily  pantoprazole  Injectable 40 milliGRAM(s) IV Push daily  senna 2 Tablet(s) Oral at bedtime    All other PRN medications:  aluminum hydroxide/magnesium hydroxide/simethicone Suspension 30 milliLiter(s) Oral every 4 hours PRN      Vital Signs Last 24 Hrs  T(C): 36.5 (12 Oct 2017 18:35), Max: 37.3 (11 Oct 2017 22:36)  T(F): 97.7 (12 Oct 2017 18:35), Max: 99.1 (11 Oct 2017 22:36)  HR: 98 (12 Oct 2017 18:25) (86 - 100)  BP: 144/63 (12 Oct 2017 18:25) (110/65 - 144/63)  BP(mean): 91 (12 Oct 2017 18:25) (80 - 91)  RR: 16 (12 Oct 2017 18:25) (16 - 17)  SpO2: 97% (12 Oct 2017 18:25) (93% - 97%)      10-11 @ 07:01  -  10-12 @ 07:00  --------------------------------------------------------  IN:    IV PiggyBack: 150 mL    lactated ringers.: 75 mL    Oral Fluid: 400 mL  Total IN: 625 mL    OUT:    Drain: 30 mL    Indwelling Catheter - Urethral: 115 mL    Voided: 1950 mL  Total OUT: 2095 mL    Total NET: -1470 mL      10-12 @ 07:01  -  10-12 @ 21:08  --------------------------------------------------------  IN:    IV PiggyBack: 100 mL  Total IN: 100 mL    OUT:    Voided: 800 mL  Total OUT: 800 mL    Total NET: -700 mL      PHYSICAL EXAM:  AAOx3, NAD  Nonlabored breathing   EOMI, PERRL  Face symmetric, SILT   Incision c/d/i   RON drain in scalp with sanguinous drainage, holding suction   Strength symmetric in upper and lower extremities  Abdominal binder in place  R leg fat graft donor site soft, flat, mild erythema around incision but no swelling    LABS:  CBC-                        12.0   10.2  )-----------( 239      ( 12 Oct 2017 07:02 )             37.8     BMP/CMP-  12 Oct 2017 07:02    140    |  102    |  15     ----------------------------<  129    4.7     |  23     |  0.62     Ca    9.4        12 Oct 2017 07:02  Phos  3.9       12 Oct 2017 07:02  Mg     2.3       12 Oct 2017 07:02      Calcium, Total Serum: 9.4 mg/dL (10-12 @ 07:02)      Assessment & Plan: 67F s/p right middle cranial fossa exploration for repair of CSF leak, mastoidectomy, fat graft from right thigh.   - pain control  - skull base precautions - no nose blowing, no bearing down, sneeze with mouth open, no straws   - continue decadron, start taper tomorrow  - diabetic diet, bowel regimen, PPI   - voiding without difficulty  - ceftriaxone  - OOBTC  PPx: SQH, SCDs

## 2017-10-12 NOTE — PROGRESS NOTE ADULT - SUBJECTIVE AND OBJECTIVE BOX
Interval Events: reviewed  Patient seen and examined at bedside.    Patient is a 67y old  Female who presents with a chief complaint of CSF Leak Repair (10 Oct 2017 15:09)  she is doing better and wants to walk around.  She slept well and eating    PAST MEDICAL & SURGICAL HISTORY:  Acid reflux  Mastoiditis  Abdominal hernia  Hypothyroid  H/O myomectomy  H/O hernia repair: incisional x 2  History of hysterectomy: partial      MEDICATIONS:  Pulmonary:    Antimicrobials:  cefTRIAXone   IVPB      cefTRIAXone   IVPB 2 Gram(s) IV Intermittent every 24 hours    Anticoagulants:  heparin  Injectable 5000 Unit(s) SubCutaneous every 8 hours    Cardiac:      Allergies    penicillin (Hives)  sulfa drugs (Rash)    Intolerances        Vital Signs Last 24 Hrs  T(C): 36.2 (12 Oct 2017 10:02), Max: 37.3 (11 Oct 2017 22:36)  T(F): 97.1 (12 Oct 2017 10:02), Max: 99.1 (11 Oct 2017 22:36)  HR: 94 (12 Oct 2017 10:28) (90 - 110)  BP: 128/58 (12 Oct 2017 10:28) (107/64 - 128/58)  BP(mean): 84 (12 Oct 2017 10:28) (72 - 87)  RR: 16 (12 Oct 2017 10:28) (12 - 26)  SpO2: 96% (12 Oct 2017 10:28) (91% - 96%)    10-11 @ 07:01  -  10-12 @ 07:00  --------------------------------------------------------  IN: 625 mL / OUT: 2095 mL / NET: -1470 mL          LABS:      CBC Full  -  ( 12 Oct 2017 07:02 )  WBC Count : 10.2 K/uL  Hemoglobin : 12.0 g/dL  Hematocrit : 37.8 %  Platelet Count - Automated : 239 K/uL  Mean Cell Volume : 84.6 fL  Mean Cell Hemoglobin : 26.8 pg  Mean Cell Hemoglobin Concentration : 31.7 g/dL  Auto Neutrophil # : x  Auto Lymphocyte # : x  Auto Monocyte # : x  Auto Eosinophil # : x  Auto Basophil # : x  Auto Neutrophil % : x  Auto Lymphocyte % : x  Auto Monocyte % : x  Auto Eosinophil % : x  Auto Basophil % : x    10-12    140  |  102  |  15  ----------------------------<  129<H>  4.7   |  23  |  0.62    Ca    9.4      12 Oct 2017 07:02  Phos  3.9     10-12  Mg     2.3     10-12                          RADIOLOGY & ADDITIONAL STUDIES (The following images were personally reviewed):  Self:                            No  Urine output:               Yes          DVT prophylaxis:         Yes         Flattus:                          Yes         Bowel movement:           No

## 2017-10-13 ENCOUNTER — TRANSCRIPTION ENCOUNTER (OUTPATIENT)
Age: 67
End: 2017-10-13

## 2017-10-13 PROBLEM — K21.9 GASTRO-ESOPHAGEAL REFLUX DISEASE WITHOUT ESOPHAGITIS: Chronic | Status: ACTIVE | Noted: 2017-10-09

## 2017-10-13 PROBLEM — H70.90 UNSPECIFIED MASTOIDITIS, UNSPECIFIED EAR: Chronic | Status: ACTIVE | Noted: 2017-10-09

## 2017-10-13 LAB
GLUCOSE BLDC GLUCOMTR-MCNC: 121 MG/DL — HIGH (ref 70–99)
GLUCOSE BLDC GLUCOMTR-MCNC: 123 MG/DL — HIGH (ref 70–99)
GLUCOSE BLDC GLUCOMTR-MCNC: 131 MG/DL — HIGH (ref 70–99)
GLUCOSE BLDC GLUCOMTR-MCNC: 140 MG/DL — HIGH (ref 70–99)

## 2017-10-13 PROCEDURE — 99233 SBSQ HOSP IP/OBS HIGH 50: CPT | Mod: GC

## 2017-10-13 RX ORDER — PANTOPRAZOLE SODIUM 20 MG/1
40 TABLET, DELAYED RELEASE ORAL
Qty: 0 | Refills: 0 | DISCHARGE
Start: 2017-10-13

## 2017-10-13 RX ORDER — DEXAMETHASONE 0.5 MG/5ML
1 ELIXIR ORAL EVERY 12 HOURS
Qty: 0 | Refills: 0 | Status: CANCELLED | OUTPATIENT
Start: 2017-10-16 | End: 2017-10-14

## 2017-10-13 RX ORDER — DEXAMETHASONE 0.5 MG/5ML
8 ELIXIR ORAL EVERY 12 HOURS
Qty: 0 | Refills: 0 | Status: COMPLETED | OUTPATIENT
Start: 2017-10-13 | End: 2017-10-14

## 2017-10-13 RX ORDER — DEXAMETHASONE 0.5 MG/5ML
1 ELIXIR ORAL DAILY
Qty: 0 | Refills: 0 | Status: CANCELLED | OUTPATIENT
Start: 2017-10-17 | End: 2017-10-14

## 2017-10-13 RX ORDER — DOCUSATE SODIUM 100 MG
1 CAPSULE ORAL
Qty: 0 | Refills: 0 | DISCHARGE
Start: 2017-10-13

## 2017-10-13 RX ORDER — SENNA PLUS 8.6 MG/1
2 TABLET ORAL
Qty: 0 | Refills: 0 | DISCHARGE
Start: 2017-10-13

## 2017-10-13 RX ORDER — ACETAMINOPHEN 500 MG
650 TABLET ORAL EVERY 6 HOURS
Qty: 0 | Refills: 0 | Status: DISCONTINUED | OUTPATIENT
Start: 2017-10-13 | End: 2017-10-14

## 2017-10-13 RX ORDER — DEXAMETHASONE 0.5 MG/5ML
1 ELIXIR ORAL
Qty: 36 | Refills: 0
Start: 2017-10-13 | End: 2017-10-22

## 2017-10-13 RX ORDER — DEXAMETHASONE 0.5 MG/5ML
2 ELIXIR ORAL EVERY 12 HOURS
Qty: 0 | Refills: 0 | Status: DISCONTINUED | OUTPATIENT
Start: 2017-10-15 | End: 2017-10-14

## 2017-10-13 RX ORDER — DEXAMETHASONE 0.5 MG/5ML
ELIXIR ORAL
Qty: 0 | Refills: 0 | Status: DISCONTINUED | OUTPATIENT
Start: 2017-10-13 | End: 2017-10-14

## 2017-10-13 RX ORDER — DEXAMETHASONE 0.5 MG/5ML
4 ELIXIR ORAL EVERY 12 HOURS
Qty: 0 | Refills: 0 | Status: DISCONTINUED | OUTPATIENT
Start: 2017-10-14 | End: 2017-10-14

## 2017-10-13 RX ADMIN — HEPARIN SODIUM 5000 UNIT(S): 5000 INJECTION INTRAVENOUS; SUBCUTANEOUS at 06:08

## 2017-10-13 RX ADMIN — FENTANYL CITRATE 12.5 MICROGRAM(S): 50 INJECTION INTRAVENOUS at 23:49

## 2017-10-13 RX ADMIN — FENTANYL CITRATE 12.5 MICROGRAM(S): 50 INJECTION INTRAVENOUS at 15:30

## 2017-10-13 RX ADMIN — FENTANYL CITRATE 12.5 MICROGRAM(S): 50 INJECTION INTRAVENOUS at 14:37

## 2017-10-13 RX ADMIN — Medication 102 MILLIGRAM(S): at 06:08

## 2017-10-13 RX ADMIN — LACTOBACILLUS ACIDOPH-L.BULGARICUS 1 MILLION CELL CHEWABLE TABLET 1 TABLET(S): at 12:07

## 2017-10-13 RX ADMIN — PANTOPRAZOLE SODIUM 40 MILLIGRAM(S): 20 TABLET, DELAYED RELEASE ORAL at 12:07

## 2017-10-13 RX ADMIN — FENTANYL CITRATE 12.5 MICROGRAM(S): 50 INJECTION INTRAVENOUS at 00:19

## 2017-10-13 RX ADMIN — HEPARIN SODIUM 5000 UNIT(S): 5000 INJECTION INTRAVENOUS; SUBCUTANEOUS at 15:23

## 2017-10-13 RX ADMIN — HEPARIN SODIUM 5000 UNIT(S): 5000 INJECTION INTRAVENOUS; SUBCUTANEOUS at 21:57

## 2017-10-13 RX ADMIN — Medication 100 MILLIGRAM(S): at 15:25

## 2017-10-13 RX ADMIN — FENTANYL CITRATE 12.5 MICROGRAM(S): 50 INJECTION INTRAVENOUS at 23:38

## 2017-10-13 RX ADMIN — Medication 102 MILLIGRAM(S): at 15:24

## 2017-10-13 RX ADMIN — CEFTRIAXONE 100 GRAM(S): 500 INJECTION, POWDER, FOR SOLUTION INTRAMUSCULAR; INTRAVENOUS at 18:03

## 2017-10-13 RX ADMIN — Medication 150 MICROGRAM(S): at 06:08

## 2017-10-13 NOTE — DIETITIAN INITIAL EVALUATION ADULT. - NS AS NUTRI INTERV MEALS SNACK
Other (specify)/mechanically soft foods Composition of meals/snacks/Other (specify)/mechanically soft foods

## 2017-10-13 NOTE — DISCHARGE NOTE ADULT - PLAN OF CARE
Return to baseline •	Report any fever, chills, difficulty breathing, difficulty swallowing, change in your voice, bleeding or purulent drainage from your incision site, or any significant swelling to the doctor immediately.  •	Diet: Resume normal diet.  •	Activity: no heavy lifting, do not lift anything heavier than a gallon of milk until after you follow up appointment with your doctor, no strenuous activity such as running or biking.  •	Shower/Bathing: you may shower, use baby shampoo and no conditioner.   •	Wound care: keep incision clean and dry.   •	Take all medications as prescribed - prednisone taper, antibiotics x 1 week, pain medication  •	Continue all of your normal home medications unless told otherwise.  •	Avoid any NSAIDS or ibuprofen/asa containing products you may take Tylenol for mild pain.

## 2017-10-13 NOTE — DISCHARGE NOTE ADULT - PATIENT PORTAL LINK FT
“You can access the [facility] Patient Portal, offered by [facility], by registering with the following website: [email or web address]”

## 2017-10-13 NOTE — PROGRESS NOTE ADULT - PROBLEM SELECTOR PLAN 4
patient had surgery and she is on antibiotic. No evidence of CSF  leak
patient had surgery and she is on antibiotic. No evidence of CSF  leak b She still on antibiotics
patient had surgery and she is on antibiotic. No evidence of CSF  leak b She still on antibiotics

## 2017-10-13 NOTE — DIETITIAN INITIAL EVALUATION ADULT. - FACTORS AFF FOOD INTAKE
none/difficulty chewing/other (specify)/difficulty chewing 2/2 jaw soreness post-op difficulty chewing/other (specify)/difficulty chewing 2/2 jaw soreness post-op. expressed preference for soft foods.

## 2017-10-13 NOTE — DIETITIAN INITIAL EVALUATION ADULT. - OTHER INFO
Pt is a 66yo female admitted with tegman defect CSF leak s/p mastoidectomy with fat graft from R thigh and middle cranial fossa craniectomy (10/10). Current diet order DASH/TLC. Pt endorses good appetite and no abdominal pain. Denies nausea, vomiting, diarrhea and constipation. C/o difficulty chewing; current PO intake of mech soft foods. No difficulty swallowing. Pt reports no pain. No significant weight changes PTA. NKFA or dietary restrictions. PTA pt recalls consuming pre-prepared meals and organic produce. RD and dietetic intern discussed [facility] soft foods to assist with chewing. Provided pt with specialized menu for DASH/TLC diet. RD provided card. Pt expressed positive verbal understanding. Will attempt to follow for further questions and chewing tolerance. Skin and GI: WDL. Pt is a 66yo female admitted with tegman defect CSF leak s/p mastoidectomy with fat graft from R thigh and middle cranial fossa craniectomy (10/10). Current diet order DASH/TLC; [facility]. Pt endorses good appetite. Denies nausea, vomiting, diarrhea and constipation. C/o difficulty chewing. No difficulty swallowing. Pain medically managed. No significant weight changes PTA. NKFA or dietary restrictions. PTA pt recalls consuming pre-prepared meals and organic produce. RD and dietetic intern discussed [facility] soft foods to assist with chewing. Provided pt with specialized menu for DASH/TLC diet. RD provided card. Pt expressed positive verbal understanding. Will attempt to follow for further questions and chewing tolerance. Skin and GI: WDL. 66yo F admitted w/ tegman defect CSF leak s/p mastoidectomy with fat graft from R thigh and middle cranial fossa craniectomy (10/10). Currently on DASH/TLC and tolerating PO, consuming ~50% meals per dietary recall. Pt reports slight difficulty chewing 2/2 sore jaw after surgery. Denies GI distress. Pain controlled. No significant weight changes. NKFA or dietary restrictions. Provided pt with  DASH diet menu to see adequate selection and discussed mechanical soft foods. Skin and GI WDL per flowsheet.

## 2017-10-13 NOTE — DIETITIAN INITIAL EVALUATION ADULT. - PHYSICAL APPEARANCE
well nourished/BMI 32.3, BW 90.7, IBW 59.1, 153%IBW/obese well nourished/BMI 32.3, BW 90.7, IBW 59.1, 153%IBW

## 2017-10-13 NOTE — PROGRESS NOTE ADULT - SUBJECTIVE AND OBJECTIVE BOX
ENT [facility] DAILY PROGRESS NOTE    67F s/p right middle cranial fossa exploration for repair of CSF leak, mastoidectomy, fat graft from right thigh on 10/11.     10/10: Patient taken directly from OR to SICU. Doing well since surgery, complaining of pressure near drain site in the head. Denies nausea, chest pain, SOB. Endorses mild headache.   10/11: Drain pulled out slightly overnight while repositioning, lost suction, hooked up to low continuous wall suction. Complaining of pain in the head, has negative reaction to multiple pain medications in the past. Tolerating clears. Has been on bedrest since OR.   10/12: No acute events. Transferred to step down. Has been OOB to chair. Complaining of some grogginess and pressure around drain site.   10/13: No acute events. Patient feeling better today, interested in walking around.     Allergies    penicillin (Hives)  sulfa drugs (Rash)    Intolerances    MEDICATIONS:  Antiinfectives:   cefTRIAXone   IVPB      cefTRIAXone   IVPB 2 Gram(s) IV Intermittent every 24 hours    IV fluids:    Hematologic/Anticoagulation:  heparin  Injectable 5000 Unit(s) SubCutaneous every 8 hours    Pain medications/Neuro:  acetaminophen  IVPB. 1000 milliGRAM(s) IV Intermittent once PRN  fentaNYL    Injectable 12.5 MICROGram(s) IV Push every 3 hours PRN  HYDROcodone  5 mG/acetaminophen 325 mG 1 Tablet(s) Oral every 4 hours PRN  ondansetron Injectable 4 milliGRAM(s) IV Push every 6 hours PRN    Endocrine Medications:   dexamethasone     Tablet 8 milliGRAM(s) Oral every 12 hours  dexamethasone     Tablet   Oral   insulin lispro (HumaLOG) corrective regimen sliding scale   SubCutaneous every 6 hours  levothyroxine 150 MICROGram(s) Oral daily    All other standing medications:   docusate sodium 100 milliGRAM(s) Oral three times a day  lactobacillus acidophilus and bulgaricus Chewable 1 Tablet(s) Chew daily  magnesium hydroxide Suspension 30 milliLiter(s) Oral daily  pantoprazole  Injectable 40 milliGRAM(s) IV Push daily  senna 2 Tablet(s) Oral at bedtime    All other PRN medications:  aluminum hydroxide/magnesium hydroxide/simethicone Suspension 30 milliLiter(s) Oral every 4 hours PRN      Vital Signs Last 24 Hrs  T(C): 36.8 (13 Oct 2017 14:12), Max: 36.8 (13 Oct 2017 14:12)  T(F): 98.2 (13 Oct 2017 14:12), Max: 98.2 (13 Oct 2017 14:12)  HR: 84 (13 Oct 2017 14:29) (82 - 98)  BP: 127/60 (13 Oct 2017 14:29) (116/64 - 144/63)  BP(mean): 84 (13 Oct 2017 14:29) (77 - 91)  RR: 16 (13 Oct 2017 14:29) (16 - 18)  SpO2: 93% (13 Oct 2017 14:29) (93% - 97%)      10-12 @ 07:01  -  10-13 @ 07:00  --------------------------------------------------------  IN:    IV PiggyBack: 100 mL  Total IN: 100 mL    OUT:    Drain: 15 mL    Voided: 800 mL  Total OUT: 815 mL    Total NET: -715 mL      PHYSICAL EXAM:  AAOx3, NAD  Nonlabored breathing   EOMI, PERRL  Face symmetric, SILT   Incision c/d/i   RON drain in scalp with minimal serous drainage, drain removed   Abdominal binder in place  R leg fat graft donor site soft, flat, mild erythema around incision but no swelling    LABS:  CBC-                        12.0   10.2  )-----------( 239      ( 12 Oct 2017 07:02 )             37.8     BMP/CMP-  12 Oct 2017 07:02    140    |  102    |  15     ----------------------------<  129    4.7     |  23     |  0.62     Ca    9.4        12 Oct 2017 07:02  Phos  3.9       12 Oct 2017 07:02  Mg     2.3       12 Oct 2017 07:02    Assessment & Plan: 67F s/p right middle cranial fossa exploration for repair of CSF leak, mastoidectomy, fat graft from right thigh.   - pain control  - skull base precautions - no nose blowing, no bearing down, sneeze with mouth open, no straws   - decadron taper   - diabetic diet, bowel regimen, PPI   - continue ceftriaxone  - OOBTC, ambulate  PPx: SQH, SCDs

## 2017-10-13 NOTE — DIETITIAN INITIAL EVALUATION ADULT. - PERTINENT MEDS FT
Heparin, Rocephin IVPB, milk of magnesia, Ofirmev, Colace, Norco, Senna, Maalox, Decadron IVPB, Fentanyl, HumaLOG, Lactinex Chew, Synthroid, Zofran, Protonix Heparin, Rocephin IVPB, milk of magnesia, Colace, Norco, Senna, Maalox, Decadron IVPB, Fentanyl, Humalog, Synthroid, Zofran, Protonix

## 2017-10-13 NOTE — DIETITIAN INITIAL EVALUATION ADULT. - NS AS NUTRI INTERV ED CONTENT
Edu pt on mechanically soft food choices with an emphasis on protein/Purpose of the nutrition education/Recommended modifications/Other (specify) Other (specify)/mechanical soft diet, increased nutrient needs for post-op recovery/Recommended modifications/Purpose of the nutrition education

## 2017-10-13 NOTE — DISCHARGE NOTE ADULT - CARE PROVIDER_API CALL
Deepak Rey), Otolaryngology  [address]  [address] [place] [address]  Phone: [phone]  Fax: [phone]

## 2017-10-13 NOTE — DIETITIAN INITIAL EVALUATION ADULT. - ETIOLOGY
RT mechanically soft foods 2/2 jaw pain s/p mastoidectomy RT slight difficulty chewing 2/2 s/p mastoidectomy

## 2017-10-13 NOTE — DISCHARGE NOTE ADULT - MEDICATION SUMMARY - MEDICATIONS TO TAKE
I will START or STAY ON the medications listed below when I get home from the hospital:    dexamethasone 2 mg oral tablet  -- 3 tab(s) by mouth 2 times a day x 3 days  2 tab(s) by mouth 2 times a day x 3 days  1 tab(s) by mouth 2 times a day x 3 days  -- It is very important that you take or use this exactly as directed.  Do not skip doses or discontinue unless directed by your doctor.  Obtain medical advice before taking any non-prescription drugs as some may affect the action of this medication.  Take with food or milk.    -- Indication: For TEGMEN DEFECT CSF LEAK    Norco 5 mg-325 mg oral tablet  -- 1 tab(s) by mouth every 4 hours, As Needed -for severe pain MDD:6  -- Caution federal law prohibits the transfer of this drug to any person other  than the person for whom it was prescribed.  May cause drowsiness.  Alcohol may intensify this effect.  Use care when operating dangerous machinery.  This product contains acetaminophen.  Do not use  with any other product containing acetaminophen to prevent possible liver damage.  Using more of this medication than prescribed may cause serious breathing problems.    -- Indication: For TEGMEN DEFECT CSF LEAK    cefadroxil 500 mg oral capsule  -- 1 cap(s) by mouth 2 times a day   -- Finish all this medication unless otherwise directed by prescriber.    -- Indication: For TEGMEN DEFECT CSF LEAK    docusate sodium 100 mg oral capsule  -- 1 cap(s) by mouth 3 times a day  -- Indication: For TEGMEN DEFECT CSF LEAK    senna oral tablet  -- 2 tab(s) by mouth once a day (at bedtime)  -- Indication: For TEGMEN DEFECT CSF LEAK    lactobacillus acidophilus and bulgaricus oral tablet, chewable  -- 1 tab(s) by mouth once a day  -- Indication: For TEGMEN DEFECT CSF LEAK    PriLOSEC OTC 20 mg oral delayed release tablet  -- 1 tab(s) by mouth once a day, As Needed  -- Indication: For TEGMEN DEFECT CSF LEAK    pantoprazole 40 mg intravenous injection  -- 40 milligram(s) intravenous once a day  -- Indication: For TEGMEN DEFECT CSF LEAK    levothyroxine 150 mcg (0.15 mg) oral tablet  -- 1 tab(s) by mouth once a day  -- Indication: For Hypothyroid    Multiple Vitamins oral tablet, chewable  -- 1 tab(s) by mouth once a day  -- Indication: For TEGMEN DEFECT CSF LEAK    Vitamin D3 1000 intl units oral capsule  -- 1 cap(s) by mouth once a day  -- Indication: For TEGMEN DEFECT CSF LEAK

## 2017-10-13 NOTE — DIETITIAN INITIAL EVALUATION ADULT. - ENERGY NEEDS
BM 90.7kg, BMI 32.3, IBW 59.1%, 153%IBW  Used ideal, BW >120%IBW  Increased nutrient needs 2/2 post-op and age (Protein 1.0-1.2/kg IBW) Ht 5'6", BM 90.7kg, BMI 32.3, IBW 59.1%, 153%IBW  IBW used for calculations as pt >120% of IBW   Increased nutrient needs 2/2 surgery w/ incision and age

## 2017-10-13 NOTE — DISCHARGE NOTE ADULT - CARE PLAN
Principal Discharge DX:	Mastoiditis  Goal:	Return to baseline  Instructions for follow-up, activity and diet:	•	Report any fever, chills, difficulty breathing, difficulty swallowing, change in your voice, bleeding or purulent drainage from your incision site, or any significant swelling to the doctor immediately.  •	Diet: Resume normal diet.  •	Activity: no heavy lifting, do not lift anything heavier than a gallon of milk until after you follow up appointment with your doctor, no strenuous activity such as running or biking.  •	Shower/Bathing: you may shower, use baby shampoo and no conditioner.   •	Wound care: keep incision clean and dry.   •	Take all medications as prescribed - prednisone taper, antibiotics x 1 week, pain medication  •	Continue all of your normal home medications unless told otherwise.  •	Avoid any NSAIDS or ibuprofen/asa containing products you may take Tylenol for mild pain.

## 2017-10-13 NOTE — DISCHARGE NOTE ADULT - HOSPITAL COURSE
67F s/p right middle cranial fossa exploration for repair of CSF leak, mastoidectomy, fat graft from right thigh on 10/11.     10/10: Patient taken directly from OR to SICU. Doing well since surgery, complaining of pressure near drain site in the head. Denies nausea, chest pain, SOB. Endorses mild headache.   10/11: Drain pulled out slightly overnight while repositioning, lost suction, hooked up to low continuous wall suction. Complaining of pain in the head, has negative reaction to multiple pain medications in the past. Tolerating clears. Has been on bedrest since OR.   10/12: No acute events. Transferred to step down. Has been OOB to chair. Complaining of some grogginess and pressure around drain site.   10/13: No acute events. Patient feeling better today, interested in walking around.   10/14: No acute events. Drain removed yesterday. Doing well, ambulating. 67F s/p right middle cranial fossa exploration for repair of CSF leak, mastoidectomy, fat graft from right thigh on 10/11.     10/10: Patient taken directly from OR to SICU. Doing well since surgery, complaining of pressure near drain site in the head. Denies nausea, chest pain, SOB. Endorses mild headache.   10/11: Drain pulled out slightly overnight while repositioning, lost suction, hooked up to low continuous wall suction. Complaining of pain in the head, has negative reaction to multiple pain medications in the past. Tolerating clears. Has been on bedrest since OR.   10/12: No acute events. Transferred to step down. Has been OOB to chair. Complaining of some grogginess and pressure around drain site.   10/13: No acute events. Patient feeling better today, interested in walking around.   10/14: No acute events. Drain removed yesterday. Doing well, ambulating. stable for discharge

## 2017-10-13 NOTE — PROGRESS NOTE ADULT - ATTENDING COMMENTS
I reviewed the preop evaluation. Self and arterial line were removed. Patient is out of bed  . Advanced diet
increase activity and she still has the drain
Increase activity.  I discussed the case with ENT and decision on the drain is P

## 2017-10-14 VITALS — TEMPERATURE: 99 F

## 2017-10-14 LAB
GLUCOSE BLDC GLUCOMTR-MCNC: 100 MG/DL — HIGH (ref 70–99)
GLUCOSE BLDC GLUCOMTR-MCNC: 105 MG/DL — HIGH (ref 70–99)
GLUCOSE BLDC GLUCOMTR-MCNC: 130 MG/DL — HIGH (ref 70–99)

## 2017-10-14 RX ADMIN — Medication 8 MILLIGRAM(S): at 06:37

## 2017-10-14 RX ADMIN — HEPARIN SODIUM 5000 UNIT(S): 5000 INJECTION INTRAVENOUS; SUBCUTANEOUS at 06:15

## 2017-10-14 RX ADMIN — Medication 150 MICROGRAM(S): at 06:37

## 2017-10-16 ENCOUNTER — APPOINTMENT (OUTPATIENT)
Dept: OTOLARYNGOLOGY | Facility: CLINIC | Age: 67
End: 2017-10-16
Payer: MEDICARE

## 2017-10-16 PROCEDURE — 99213 OFFICE O/P EST LOW 20 MIN: CPT

## 2017-10-17 DIAGNOSIS — K21.9 GASTRO-ESOPHAGEAL REFLUX DISEASE WITHOUT ESOPHAGITIS: ICD-10-CM

## 2017-10-17 DIAGNOSIS — H74.8X1 OTHER SPECIFIED DISORDERS OF RIGHT MIDDLE EAR AND MASTOID: ICD-10-CM

## 2017-10-17 DIAGNOSIS — E03.9 HYPOTHYROIDISM, UNSPECIFIED: ICD-10-CM

## 2017-10-17 DIAGNOSIS — G96.11 DURAL TEAR: ICD-10-CM

## 2017-10-17 DIAGNOSIS — G96.0 CEREBROSPINAL FLUID LEAK: ICD-10-CM

## 2017-10-17 DIAGNOSIS — H70.11 CHRONIC MASTOIDITIS, RIGHT EAR: ICD-10-CM

## 2017-10-17 DIAGNOSIS — J45.909 UNSPECIFIED ASTHMA, UNCOMPLICATED: ICD-10-CM

## 2017-10-18 LAB
BASOPHILS # BLD AUTO: 0.01 K/UL
BASOPHILS NFR BLD AUTO: 0.1 %
EOSINOPHIL # BLD AUTO: 0.01 K/UL
EOSINOPHIL NFR BLD AUTO: 0.1 %
HCT VFR BLD CALC: 42.3 %
HGB BLD-MCNC: 14.6 G/DL
IMM GRANULOCYTES NFR BLD AUTO: 1.7 %
LYMPHOCYTES # BLD AUTO: 2.41 K/UL
LYMPHOCYTES NFR BLD AUTO: 21.9 %
MAGNESIUM SERPL-MCNC: 2.1 MG/DL
MAN DIFF?: NORMAL
MCHC RBC-ENTMCNC: 28.6 PG
MCHC RBC-ENTMCNC: 34.5 GM/DL
MCV RBC AUTO: 82.8 FL
MONOCYTES # BLD AUTO: 0.74 K/UL
MONOCYTES NFR BLD AUTO: 6.7 %
NEUTROPHILS # BLD AUTO: 7.64 K/UL
NEUTROPHILS NFR BLD AUTO: 69.5 %
PLATELET # BLD AUTO: 300 K/UL
RBC # BLD: 5.11 M/UL
RBC # FLD: 13.9 %
WBC # FLD AUTO: 11 K/UL

## 2017-10-23 ENCOUNTER — APPOINTMENT (OUTPATIENT)
Dept: OTOLARYNGOLOGY | Facility: CLINIC | Age: 67
End: 2017-10-23
Payer: MEDICARE

## 2017-10-23 VITALS
HEIGHT: 66 IN | HEART RATE: 80 BPM | OXYGEN SATURATION: 96 % | WEIGHT: 198 LBS | DIASTOLIC BLOOD PRESSURE: 77 MMHG | TEMPERATURE: 98.2 F | BODY MASS INDEX: 31.82 KG/M2 | RESPIRATION RATE: 15 BRPM | SYSTOLIC BLOOD PRESSURE: 120 MMHG

## 2017-10-23 PROCEDURE — 99213 OFFICE O/P EST LOW 20 MIN: CPT

## 2017-10-31 ENCOUNTER — APPOINTMENT (OUTPATIENT)
Dept: INTERNAL MEDICINE | Facility: CLINIC | Age: 67
End: 2017-10-31
Payer: MEDICARE

## 2017-10-31 PROCEDURE — 99213 OFFICE O/P EST LOW 20 MIN: CPT

## 2017-11-08 ENCOUNTER — APPOINTMENT (OUTPATIENT)
Dept: INTERNAL MEDICINE | Facility: CLINIC | Age: 67
End: 2017-11-08
Payer: MEDICARE

## 2017-11-08 VITALS
HEART RATE: 82 BPM | SYSTOLIC BLOOD PRESSURE: 118 MMHG | RESPIRATION RATE: 12 BRPM | OXYGEN SATURATION: 99 % | DIASTOLIC BLOOD PRESSURE: 68 MMHG

## 2017-11-08 VITALS — TEMPERATURE: 98 F

## 2017-11-08 PROCEDURE — 99213 OFFICE O/P EST LOW 20 MIN: CPT

## 2017-11-13 ENCOUNTER — LABORATORY RESULT (OUTPATIENT)
Age: 67
End: 2017-11-13

## 2017-11-13 ENCOUNTER — APPOINTMENT (OUTPATIENT)
Dept: OTOLARYNGOLOGY | Facility: CLINIC | Age: 67
End: 2017-11-13
Payer: MEDICARE

## 2017-11-13 VITALS
TEMPERATURE: 98 F | OXYGEN SATURATION: 98 % | HEART RATE: 92 BPM | DIASTOLIC BLOOD PRESSURE: 80 MMHG | SYSTOLIC BLOOD PRESSURE: 127 MMHG

## 2017-11-13 DIAGNOSIS — H70.11 CHRONIC MASTOIDITIS, RIGHT EAR: ICD-10-CM

## 2017-11-13 PROCEDURE — 99214 OFFICE O/P EST MOD 30 MIN: CPT | Mod: 24

## 2017-11-15 ENCOUNTER — APPOINTMENT (OUTPATIENT)
Dept: OTOLARYNGOLOGY | Facility: CLINIC | Age: 67
End: 2017-11-15
Payer: MEDICARE

## 2017-11-15 PROCEDURE — 92550 TYMPANOMETRY & REFLEX THRESH: CPT

## 2017-11-15 PROCEDURE — 92557 COMPREHENSIVE HEARING TEST: CPT

## 2017-11-15 PROCEDURE — 99024 POSTOP FOLLOW-UP VISIT: CPT

## 2017-11-21 ENCOUNTER — APPOINTMENT (OUTPATIENT)
Dept: OTOLARYNGOLOGY | Facility: CLINIC | Age: 67
End: 2017-11-21
Payer: MEDICARE

## 2017-11-21 ENCOUNTER — LABORATORY RESULT (OUTPATIENT)
Age: 67
End: 2017-11-21

## 2017-11-21 VITALS
HEART RATE: 99 BPM | TEMPERATURE: 98.4 F | SYSTOLIC BLOOD PRESSURE: 120 MMHG | OXYGEN SATURATION: 97 % | DIASTOLIC BLOOD PRESSURE: 75 MMHG

## 2017-11-21 PROCEDURE — 99024 POSTOP FOLLOW-UP VISIT: CPT

## 2017-12-04 ENCOUNTER — APPOINTMENT (OUTPATIENT)
Dept: OTOLARYNGOLOGY | Facility: CLINIC | Age: 67
End: 2017-12-04
Payer: MEDICARE

## 2017-12-04 VITALS
HEART RATE: 96 BPM | OXYGEN SATURATION: 95 % | SYSTOLIC BLOOD PRESSURE: 119 MMHG | TEMPERATURE: 99.1 F | DIASTOLIC BLOOD PRESSURE: 75 MMHG

## 2017-12-04 PROCEDURE — 99024 POSTOP FOLLOW-UP VISIT: CPT

## 2017-12-06 ENCOUNTER — APPOINTMENT (OUTPATIENT)
Dept: INTERNAL MEDICINE | Facility: CLINIC | Age: 67
End: 2017-12-06
Payer: MEDICARE

## 2017-12-06 VITALS
BODY MASS INDEX: 34.4 KG/M2 | TEMPERATURE: 98.1 F | OXYGEN SATURATION: 98 % | SYSTOLIC BLOOD PRESSURE: 128 MMHG | WEIGHT: 209 LBS | HEIGHT: 65.5 IN | DIASTOLIC BLOOD PRESSURE: 90 MMHG

## 2017-12-06 DIAGNOSIS — Q01.9 ENCEPHALOCELE, UNSPECIFIED: ICD-10-CM

## 2017-12-06 DIAGNOSIS — Z86.61 PERSONAL HISTORY OF INFECTIONS OF THE CENTRAL NERVOUS SYSTEM: ICD-10-CM

## 2017-12-06 DIAGNOSIS — L08.9 LOCAL INFECTION OF THE SKIN AND SUBCUTANEOUS TISSUE, UNSPECIFIED: ICD-10-CM

## 2017-12-06 DIAGNOSIS — Z09 ENCOUNTER FOR FOLLOW-UP EXAMINATION AFTER COMPLETED TREATMENT FOR CONDITIONS OTHER THAN MALIGNANT NEOPLASM: ICD-10-CM

## 2017-12-06 PROCEDURE — 99214 OFFICE O/P EST MOD 30 MIN: CPT

## 2017-12-07 LAB
ANION GAP SERPL CALC-SCNC: 16 MMOL/L
BUN SERPL-MCNC: 25 MG/DL
CALCIUM SERPL-MCNC: 9.5 MG/DL
CHLORIDE SERPL-SCNC: 99 MMOL/L
CO2 SERPL-SCNC: 23 MMOL/L
CREAT SERPL-MCNC: 0.73 MG/DL
GLUCOSE SERPL-MCNC: 121 MG/DL
POTASSIUM SERPL-SCNC: 4.8 MMOL/L
SODIUM SERPL-SCNC: 138 MMOL/L

## 2017-12-13 ENCOUNTER — OUTPATIENT (OUTPATIENT)
Dept: OUTPATIENT SERVICES | Facility: HOSPITAL | Age: 67
LOS: 1 days | End: 2017-12-13
Payer: MEDICARE

## 2017-12-13 DIAGNOSIS — Z98.890 OTHER SPECIFIED POSTPROCEDURAL STATES: Chronic | ICD-10-CM

## 2017-12-13 DIAGNOSIS — Z90.710 ACQUIRED ABSENCE OF BOTH CERVIX AND UTERUS: Chronic | ICD-10-CM

## 2017-12-13 PROCEDURE — 76882 US LMTD JT/FCL EVL NVASC XTR: CPT | Mod: 26,LT

## 2017-12-13 PROCEDURE — 76882 US LMTD JT/FCL EVL NVASC XTR: CPT

## 2017-12-14 ENCOUNTER — APPOINTMENT (OUTPATIENT)
Dept: OTOLARYNGOLOGY | Facility: CLINIC | Age: 67
End: 2017-12-14
Payer: MEDICARE

## 2017-12-14 VITALS
DIASTOLIC BLOOD PRESSURE: 69 MMHG | SYSTOLIC BLOOD PRESSURE: 111 MMHG | TEMPERATURE: 98.7 F | HEART RATE: 88 BPM | OXYGEN SATURATION: 96 %

## 2017-12-14 PROCEDURE — 92557 COMPREHENSIVE HEARING TEST: CPT

## 2017-12-14 PROCEDURE — 99024 POSTOP FOLLOW-UP VISIT: CPT

## 2017-12-14 PROCEDURE — 92550 TYMPANOMETRY & REFLEX THRESH: CPT

## 2017-12-15 ENCOUNTER — APPOINTMENT (OUTPATIENT)
Dept: VASCULAR SURGERY | Facility: CLINIC | Age: 67
End: 2017-12-15
Payer: MEDICARE

## 2017-12-15 PROCEDURE — 99203 OFFICE O/P NEW LOW 30 MIN: CPT

## 2017-12-18 ENCOUNTER — FORM ENCOUNTER (OUTPATIENT)
Age: 67
End: 2017-12-18

## 2017-12-19 ENCOUNTER — OUTPATIENT (OUTPATIENT)
Dept: OUTPATIENT SERVICES | Facility: HOSPITAL | Age: 67
LOS: 1 days | End: 2017-12-19

## 2017-12-19 ENCOUNTER — APPOINTMENT (OUTPATIENT)
Dept: CT IMAGING | Facility: CLINIC | Age: 67
End: 2017-12-19
Payer: MEDICARE

## 2017-12-19 DIAGNOSIS — Z98.890 OTHER SPECIFIED POSTPROCEDURAL STATES: Chronic | ICD-10-CM

## 2017-12-19 DIAGNOSIS — Z90.710 ACQUIRED ABSENCE OF BOTH CERVIX AND UTERUS: Chronic | ICD-10-CM

## 2017-12-19 PROCEDURE — 80048 BASIC METABOLIC PNL TOTAL CA: CPT

## 2017-12-19 PROCEDURE — 84100 ASSAY OF PHOSPHORUS: CPT

## 2017-12-19 PROCEDURE — 82962 GLUCOSE BLOOD TEST: CPT

## 2017-12-19 PROCEDURE — C1889: CPT

## 2017-12-19 PROCEDURE — 70480 CT ORBIT/EAR/FOSSA W/O DYE: CPT | Mod: 26

## 2017-12-19 PROCEDURE — 82550 ASSAY OF CK (CPK): CPT

## 2017-12-19 PROCEDURE — 82553 CREATINE MB FRACTION: CPT

## 2017-12-19 PROCEDURE — 83735 ASSAY OF MAGNESIUM: CPT

## 2017-12-19 PROCEDURE — 83935 ASSAY OF URINE OSMOLALITY: CPT

## 2017-12-19 PROCEDURE — 82436 ASSAY OF URINE CHLORIDE: CPT

## 2017-12-19 PROCEDURE — 84484 ASSAY OF TROPONIN QUANT: CPT

## 2017-12-19 PROCEDURE — 84300 ASSAY OF URINE SODIUM: CPT

## 2017-12-19 PROCEDURE — C1713: CPT

## 2017-12-19 PROCEDURE — 85027 COMPLETE CBC AUTOMATED: CPT

## 2017-12-19 PROCEDURE — 82570 ASSAY OF URINE CREATININE: CPT

## 2017-12-19 PROCEDURE — 36415 COLL VENOUS BLD VENIPUNCTURE: CPT

## 2017-12-21 ENCOUNTER — APPOINTMENT (OUTPATIENT)
Dept: OTOLARYNGOLOGY | Facility: CLINIC | Age: 67
End: 2017-12-21
Payer: MEDICARE

## 2017-12-21 VITALS
DIASTOLIC BLOOD PRESSURE: 74 MMHG | TEMPERATURE: 98.7 F | SYSTOLIC BLOOD PRESSURE: 112 MMHG | HEART RATE: 98 BPM | OXYGEN SATURATION: 97 %

## 2017-12-21 PROCEDURE — 99024 POSTOP FOLLOW-UP VISIT: CPT

## 2017-12-27 ENCOUNTER — APPOINTMENT (OUTPATIENT)
Dept: OTOLARYNGOLOGY | Facility: CLINIC | Age: 67
End: 2017-12-27

## 2018-01-03 ENCOUNTER — OTHER (OUTPATIENT)
Age: 68
End: 2018-01-03

## 2018-01-08 ENCOUNTER — APPOINTMENT (OUTPATIENT)
Dept: OTOLARYNGOLOGY | Facility: CLINIC | Age: 68
End: 2018-01-08
Payer: MEDICARE

## 2018-01-08 VITALS
WEIGHT: 209 LBS | SYSTOLIC BLOOD PRESSURE: 128 MMHG | HEART RATE: 103 BPM | BODY MASS INDEX: 34.4 KG/M2 | TEMPERATURE: 98.6 F | HEIGHT: 65.5 IN | DIASTOLIC BLOOD PRESSURE: 75 MMHG

## 2018-01-08 DIAGNOSIS — H90.11 CONDUCTIVE HEARING LOSS, UNILATERAL, RIGHT EAR, WITH UNRESTRICTED HEARING ON THE CONTRALATERAL SIDE: ICD-10-CM

## 2018-01-08 PROCEDURE — 99214 OFFICE O/P EST MOD 30 MIN: CPT | Mod: 24

## 2018-01-09 PROBLEM — H90.11 CONDUCTIVE HEARING LOSS IN RIGHT EAR: Status: ACTIVE | Noted: 2018-01-09

## 2018-01-24 ENCOUNTER — APPOINTMENT (OUTPATIENT)
Dept: OTOLARYNGOLOGY | Facility: CLINIC | Age: 68
End: 2018-01-24
Payer: MEDICARE

## 2018-01-24 VITALS
HEART RATE: 81 BPM | TEMPERATURE: 98.5 F | DIASTOLIC BLOOD PRESSURE: 74 MMHG | OXYGEN SATURATION: 98 % | SYSTOLIC BLOOD PRESSURE: 110 MMHG

## 2018-01-24 PROCEDURE — 99214 OFFICE O/P EST MOD 30 MIN: CPT

## 2018-01-24 NOTE — PROGRESS NOTE ADULT - ASSESSMENT
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 Rey); h/o pneumocephalus, h/o meningitis; GERD, hypothyroidism    PLAN:   NEURO: neurochecks q1h, PRN pain meds with tylenol  CSF leak s/p repair: WOF for rhinorrhea; steroid taper as per ENT  REHAB:  physical therapy evaluation and management    EARLY MOB:  HOB up    PULM:  PRN O2 support to keep sats >/=92%, NO incentive spirometry  CARDIO:  SBP goal 100-150mm Hg  ENDO:  Blood sugar goals 140-180 mg/dL, continue insulin sliding scale, continue thyroid supplement at home doses  GI:  PPI for GI prophylaxis while on steroids, also has GERD (home meds)  DIET: NPO  RENAL:  IVF while NPO  HEM/ONC: check post-op Hb  VTE Prophylaxis: SCDs only, no DVT chemoprophylaxis for now as patient is high risk for bleed (fresh post-op)  ID: afebrile, f/u post-op CBC  Social: will update family    Patient at high risk for neurological deterioration or death due to:  meningitis, intracranial bleed, seizures, DVT / PE, aspiration.  Critical care time, excluding procedures: 60 minutes. no

## 2018-02-01 ENCOUNTER — APPOINTMENT (OUTPATIENT)
Dept: OTOLARYNGOLOGY | Facility: CLINIC | Age: 68
End: 2018-02-01

## 2018-02-02 ENCOUNTER — APPOINTMENT (OUTPATIENT)
Dept: INTERNAL MEDICINE | Facility: CLINIC | Age: 68
End: 2018-02-02
Payer: MEDICARE

## 2018-02-02 VITALS
SYSTOLIC BLOOD PRESSURE: 140 MMHG | OXYGEN SATURATION: 98 % | TEMPERATURE: 98.1 F | BODY MASS INDEX: 35.16 KG/M2 | WEIGHT: 211 LBS | HEART RATE: 100 BPM | DIASTOLIC BLOOD PRESSURE: 82 MMHG | HEIGHT: 65 IN

## 2018-02-02 DIAGNOSIS — H90.11 CONDUCTIVE HEARING LOSS, UNILATERAL, RIGHT EAR, WITH UNRESTRICTED HEARING ON THE CONTRALATERAL SIDE: ICD-10-CM

## 2018-02-02 DIAGNOSIS — H65.91 UNSPECIFIED NONSUPPURATIVE OTITIS MEDIA, RIGHT EAR: ICD-10-CM

## 2018-02-02 DIAGNOSIS — Z09 ENCOUNTER FOR FOLLOW-UP EXAMINATION AFTER COMPLETED TREATMENT FOR CONDITIONS OTHER THAN MALIGNANT NEOPLASM: ICD-10-CM

## 2018-02-02 PROCEDURE — 99214 OFFICE O/P EST MOD 30 MIN: CPT

## 2018-03-12 ENCOUNTER — EMERGENCY (EMERGENCY)
Facility: HOSPITAL | Age: 68
LOS: 1 days | Discharge: HOME | End: 2018-03-12
Attending: EMERGENCY MEDICINE | Admitting: EMERGENCY MEDICINE
Payer: MEDICARE

## 2018-03-12 VITALS
HEART RATE: 225 BPM | SYSTOLIC BLOOD PRESSURE: 95 MMHG | RESPIRATION RATE: 20 BRPM | DIASTOLIC BLOOD PRESSURE: 62 MMHG | OXYGEN SATURATION: 95 %

## 2018-03-12 DIAGNOSIS — Z98.890 OTHER SPECIFIED POSTPROCEDURAL STATES: Chronic | ICD-10-CM

## 2018-03-12 DIAGNOSIS — K46.9 UNSPECIFIED ABDOMINAL HERNIA WITHOUT OBSTRUCTION OR GANGRENE: ICD-10-CM

## 2018-03-12 DIAGNOSIS — Z88.0 ALLERGY STATUS TO PENICILLIN: ICD-10-CM

## 2018-03-12 DIAGNOSIS — Z90.710 ACQUIRED ABSENCE OF BOTH CERVIX AND UTERUS: Chronic | ICD-10-CM

## 2018-03-12 DIAGNOSIS — R07.9 CHEST PAIN, UNSPECIFIED: ICD-10-CM

## 2018-03-12 DIAGNOSIS — Z79.52 LONG TERM (CURRENT) USE OF SYSTEMIC STEROIDS: ICD-10-CM

## 2018-03-12 DIAGNOSIS — E03.9 HYPOTHYROIDISM, UNSPECIFIED: ICD-10-CM

## 2018-03-12 DIAGNOSIS — I47.1 SUPRAVENTRICULAR TACHYCARDIA: ICD-10-CM

## 2018-03-12 DIAGNOSIS — R11.0 NAUSEA: ICD-10-CM

## 2018-03-12 DIAGNOSIS — Z79.899 OTHER LONG TERM (CURRENT) DRUG THERAPY: ICD-10-CM

## 2018-03-12 DIAGNOSIS — Z88.2 ALLERGY STATUS TO SULFONAMIDES: ICD-10-CM

## 2018-03-12 LAB
ALBUMIN SERPL ELPH-MCNC: 3.6 G/DL — SIGNIFICANT CHANGE UP (ref 3.4–5)
ALP SERPL-CCNC: 83 U/L — SIGNIFICANT CHANGE UP (ref 40–120)
ALT FLD-CCNC: 132 U/L — HIGH (ref 12–42)
ANION GAP SERPL CALC-SCNC: 15 MMOL/L — SIGNIFICANT CHANGE UP (ref 9–16)
APTT BLD: 32.3 SEC — SIGNIFICANT CHANGE UP (ref 27.5–36.5)
AST SERPL-CCNC: 85 U/L — HIGH (ref 15–37)
BILIRUB SERPL-MCNC: 0.3 MG/DL — SIGNIFICANT CHANGE UP (ref 0.2–1.2)
BUN SERPL-MCNC: 23 MG/DL — SIGNIFICANT CHANGE UP (ref 7–23)
CALCIUM SERPL-MCNC: 9 MG/DL — SIGNIFICANT CHANGE UP (ref 8.5–10.5)
CHLORIDE SERPL-SCNC: 106 MMOL/L — SIGNIFICANT CHANGE UP (ref 96–108)
CK MB BLD-MCNC: 2.93 % — SIGNIFICANT CHANGE UP
CK MB CFR SERPL CALC: 1.7 NG/ML — SIGNIFICANT CHANGE UP (ref 0.5–3.6)
CO2 SERPL-SCNC: 18 MMOL/L — LOW (ref 22–31)
CREAT SERPL-MCNC: 1.29 MG/DL — SIGNIFICANT CHANGE UP (ref 0.5–1.3)
GLUCOSE SERPL-MCNC: 200 MG/DL — HIGH (ref 70–99)
HCT VFR BLD CALC: 45.7 % — HIGH (ref 34.5–45)
HGB BLD-MCNC: 14.9 G/DL — SIGNIFICANT CHANGE UP (ref 11.5–15.5)
INR BLD: 0.93 — SIGNIFICANT CHANGE UP (ref 0.88–1.16)
MAGNESIUM SERPL-MCNC: 1.8 MG/DL — SIGNIFICANT CHANGE UP (ref 1.6–2.6)
MCHC RBC-ENTMCNC: 28.2 PG — SIGNIFICANT CHANGE UP (ref 27–34)
MCHC RBC-ENTMCNC: 32.6 G/DL — SIGNIFICANT CHANGE UP (ref 32–36)
MCV RBC AUTO: 86.4 FL — SIGNIFICANT CHANGE UP (ref 80–100)
NT-PROBNP SERPL-SCNC: 23 PG/ML — SIGNIFICANT CHANGE UP
PLATELET # BLD AUTO: 263 K/UL — SIGNIFICANT CHANGE UP (ref 150–400)
POTASSIUM SERPL-MCNC: 4.2 MMOL/L — SIGNIFICANT CHANGE UP (ref 3.5–5.3)
POTASSIUM SERPL-SCNC: 4.2 MMOL/L — SIGNIFICANT CHANGE UP (ref 3.5–5.3)
PROT SERPL-MCNC: 7 G/DL — SIGNIFICANT CHANGE UP (ref 6.4–8.2)
PROTHROM AB SERPL-ACNC: 10.2 SEC — SIGNIFICANT CHANGE UP (ref 9.8–12.7)
RBC # BLD: 5.29 M/UL — HIGH (ref 3.8–5.2)
RBC # FLD: 13.5 % — SIGNIFICANT CHANGE UP (ref 10.3–16.9)
SODIUM SERPL-SCNC: 139 MMOL/L — SIGNIFICANT CHANGE UP (ref 132–145)
TROPONIN I SERPL-MCNC: <0.017 NG/ML — LOW (ref 0.02–0.06)
TSH SERPL-MCNC: 11.75 UIU/ML — HIGH (ref 0.36–3.74)
WBC # BLD: 12.1 K/UL — HIGH (ref 3.8–10.5)
WBC # FLD AUTO: 12.1 K/UL — HIGH (ref 3.8–10.5)

## 2018-03-12 PROCEDURE — 71045 X-RAY EXAM CHEST 1 VIEW: CPT | Mod: 26

## 2018-03-12 PROCEDURE — 99220: CPT | Mod: 25

## 2018-03-12 PROCEDURE — 93010 ELECTROCARDIOGRAM REPORT: CPT

## 2018-03-12 PROCEDURE — 99284 EMERGENCY DEPT VISIT MOD MDM: CPT

## 2018-03-12 RX ORDER — SODIUM CHLORIDE 9 MG/ML
2000 INJECTION INTRAMUSCULAR; INTRAVENOUS; SUBCUTANEOUS ONCE
Qty: 0 | Refills: 0 | Status: COMPLETED | OUTPATIENT
Start: 2018-03-12 | End: 2018-03-12

## 2018-03-12 RX ORDER — ONDANSETRON 8 MG/1
8 TABLET, FILM COATED ORAL ONCE
Qty: 0 | Refills: 0 | Status: COMPLETED | OUTPATIENT
Start: 2018-03-12 | End: 2018-03-12

## 2018-03-12 RX ORDER — SODIUM CHLORIDE 9 MG/ML
1000 INJECTION INTRAMUSCULAR; INTRAVENOUS; SUBCUTANEOUS ONCE
Qty: 0 | Refills: 0 | Status: COMPLETED | OUTPATIENT
Start: 2018-03-12 | End: 2018-03-12

## 2018-03-12 RX ADMIN — SODIUM CHLORIDE 4000 MILLILITER(S): 9 INJECTION INTRAMUSCULAR; INTRAVENOUS; SUBCUTANEOUS at 22:43

## 2018-03-12 RX ADMIN — SODIUM CHLORIDE 2000 MILLILITER(S): 9 INJECTION INTRAMUSCULAR; INTRAVENOUS; SUBCUTANEOUS at 22:43

## 2018-03-12 RX ADMIN — ONDANSETRON 8 MILLIGRAM(S): 8 TABLET, FILM COATED ORAL at 22:00

## 2018-03-12 NOTE — ED CDU PROVIDER INITIAL DAY NOTE - PROGRESS NOTE
Patient aware of labs and need to be fasting.     Patient states last night/this morning he came down with a bad cold- body aches and feeling run down. Discussed OTC medications at length and when to contact the clinic for worsening of symptoms. Advised to contact the clinic by Thursday if no improvement. Patient verbalized understanding. No further questions.    Improved.

## 2018-03-12 NOTE — ED PROVIDER NOTE - DIAGNOSTIC INTERPRETATION
Xray (wet reads) interpreted by SHU NEWBY, JORGE \  CXR - Cardiac silhouette, aortic knob, mediastinal and hilar contours appear wnl, no acute consolidation, infiltrate, effusion, or PTX. No bony abnormalities noted

## 2018-03-12 NOTE — ED PROVIDER NOTE - ATTENDING CONTRIBUTION TO CARE
Pt is a 66yo F with a h/o bacterial meningitis (s/p craniotomy and mastoidectomy), GERD, abd hernia, and is BIB EMS for sudden onset of CP and palpitations.  CP is b/l and described as tightness - no radiation.  Sxs associated with light headed sensation - worse with lying flat.  +Near syncope prior to EMS arrival.  EMS discovered pt is in SVT and gave adenosine 6/12/12 - no no response.  PE - agree with PA exam as above.  Initial BP borderline but improves after IV hydration.    A/P - SVT with rate in 220s.  Pads placed, IV x 2, monitor, EKG.  Vagal manoeuvres attempted and HR goes from 220s to 190s-200s - color and BP improve.  Pt already failed adenosine.  Will give cardizem 10mg.  Pt's HR went down to 100 after Cardizem - NSR.  BP remains stable.  Cardiology consulted.  Will give PO Cardizem and place on observation to observe overnight.  Cards to eval in AM.

## 2018-03-12 NOTE — ED PROVIDER NOTE - MEDICAL DECISION MAKING DETAILS
Pt seen and evaluated immediately upon arrival due to high risk of imminent life threatening condition.  Placed on cardiac monitor and pacer pads immediately. Noted new onset SVT with HR in 220s with CP, near syncope sensation, BP tenuous with IVF wide open, pt stable otherwise, HR sustaining in 220s, attempted carotid massage and vagal maneuvers without successful, BP improved s/p IVF and given cardizem IV and PO with successful reversion of SVT back to NSR with IRBBB, cardiology consulted, will keep in obs for further monitoring and serial labs

## 2018-03-12 NOTE — ED PROVIDER NOTE - PROGRESS NOTE DETAILS
Pt seen and evaluated immediately upon arrival due to high risk of imminent life threatening condition.  Placed on cardiac monitor and pacer pads immediately. BP tenuous with IVF wide open, pt stable otherwise, HR sustaining in 220s, attempted carotid massage and vagal maneuvers. BP improved to 130/80s, will give dose of IV cardizem 10mg for rate control reverted back to NSR, rhythm strip obtained, IRBBB, no acute ST - T wave changes, loaded with po cardizem 60mg, will keep in obs for further monitoring and cardio consult.  Dr. Syed made aware

## 2018-03-12 NOTE — ED PROVIDER NOTE - PHYSICAL EXAMINATION
Gen - NAD, comfortable in stretcher, non-toxic appearing, speaking in full sentences   Skin - warm, dry, intact  HEENT - AT/NC, PERRL, EOMI, no conjunctival injection, moist oral mucosa, o/p clear with no erythema, edema, or exudate, uvula midline, airway patent, neck supple and NT, FROM, no JVD or carotid bruits b/l, no palpable nodes  CV - S1S2, R/R/R  Resp - respiration non-labored, CTAB, symmetric bs b/l, no r/r/w  GI - NABS, soft, ND, NT, no rebound or guarding, no CVAT b/l   MS - w/w/p, no c/c/e, calves supple and NT, distal pulses symmetric b/l   Neuro - AxOx3, no focal neuro deficits, CN II-XII grossly intact, cerebellar function intact, negative pronator drift, negative nystagmus, ambulatory without gait disturbance Gen - WDWN F, diaphoretic and uncomfortable appearing, no respiratory distress, speaking in full sentences    Skin - warm, moist, no acute rash   HEENT - AT/NC, PERRL, EOMI, no conjunctival injection, moist oral mucosa, o/p clear with no erythema, edema, or exudate, uvula midline, airway patent, neck supple and NT, FROM, no JVD or carotid bruits b/l, no palpable nodes  CV - S1S2, rapid rate, regular rhythm, no m/g/r   Resp - respiration non-labored, CTAB, symmetric bs b/l, no r/r/w  GI - NABS, soft, +abdominal hernia with abdominal binder, NT, no CVAT b/l   MS - w/w/p, no c/c/e, calves supple and NT, distal pulses symmetric b/l   Neuro - AxOx3, no focal neuro deficits, CN II-XII grossly intact

## 2018-03-12 NOTE — ED CDU PROVIDER INITIAL DAY NOTE - PROGRESS NOTE DETAILS
Pt seen and evaluated immediately upon arrival due to high risk of imminent life threatening condition.  Placed on cardiac monitor and pacer pads immediately. Noted new onset SVT with HR in 220s with CP, near syncope sensation, BP tenuous with IVF wide open, pt stable otherwise, HR sustaining in 220s, attempted carotid massage and vagal maneuvers without successful, BP improved s/p IVF and given cardizem IV and PO with successful reversion of SVT back to NSR with IRBBB, cardiology consulted, will keep in obs for further monitoring and serial labs BP improved to 130/80s, will give dose of IV cardizem 10mg for rate control HR improved but noted intermittently increased to 110s, BP stable, pt asymptomatic and well appearing, will add another 30mg of po cardizem, continue w current management and tele monitoring

## 2018-03-12 NOTE — ED PROVIDER NOTE - CRITICAL CARE PROVIDED
interpretation of diagnostic studies/consultation with other physicians/direct patient care (not related to procedure)/documentation/additional history taking/consult w/ pt's family directly relating to pts condition

## 2018-03-12 NOTE — ED PROVIDER NOTE - OBJECTIVE STATEMENT
68 yo F with PMHx of bacterial meningitis, s/p craniotomy with mastoidectomy, GERD, abdominal hernia, BIBA for CP, palpitations, and near syncope sensation.  Denies fever, chills, palpitations, diaphoresis, JUDGE, SOB, orthopnea, cough, hemoptysis, wheezing, peripheral edema, focal weakness, numbness, tingling, paresthesia, HA, dizziness, neck pain, N/V/D/C, abdominal pain, change in urinary/bowel function, trauma, fall, rash, and malaise. Last PO intake at 8pm 68 yo F with PMHx of bacterial meningitis, s/p craniotomy with mastoidectomy, GERD, abdominal hernia, BIBA for CP, palpitations, and near syncope sensation. Pt reports sudden onset of palpitations after dinner with associated lightheadedness, chest pressure, near syncope sensation around 9pm tonight.  On EMS arrival, pt was noted to have HR in 220s, /80s, EKG suggestive of SVT, s/p 6mg of adenosine, followed by 12mg, and another 12mg with no change in rhythm or sx.  Denies fever, chills, orthopnea, cough, hemoptysis, wheezing, peripheral edema, focal weakness, numbness, tingling, paresthesia, HA, neck pain, V/D/C, abdominal pain, change in urinary/bowel function, trauma, fall, rash, and malaise. Last PO intake at 8pm.  No prior h/o cardiac dz.  No family of significant medical conditions noted

## 2018-03-12 NOTE — ED ADULT TRIAGE NOTE - CHIEF COMPLAINT QUOTE
Pt brought in by EMS for chest pain, dizziness and fast heart beat. Pt brought directly to resus room, SVT with rate of 225.  81mg X 2 ASA given by EMS. Also given adenosine X 3 doses.

## 2018-03-12 NOTE — ED CDU PROVIDER INITIAL DAY NOTE - OBJECTIVE STATEMENT
66 yo F with PMHx of bacterial meningitis, s/p craniotomy with mastoidectomy, GERD, abdominal hernia, BIBA for CP, palpitations, and near syncope sensation. Pt reports sudden onset of palpitations after dinner with associated lightheadedness, chest pressure, near syncope sensation around 9pm tonight.  On EMS arrival, pt was noted to have HR in 220s, /80s, EKG suggestive of SVT, s/p 6mg of adenosine, followed by 12mg, and another 12mg with no change in rhythm or sx.  Denies fever, chills, orthopnea, cough, hemoptysis, wheezing, peripheral edema, focal weakness, numbness, tingling, paresthesia, HA, neck pain, V/D/C, abdominal pain, change in urinary/bowel function, trauma, fall, rash, and malaise. Last PO intake at 8pm.  No prior h/o cardiac dz.  No family of significant medical conditions noted

## 2018-03-12 NOTE — ED CDU PROVIDER INITIAL DAY NOTE - PHYSICAL EXAMINATION
Gen - WDWN F, diaphoretic and uncomfortable appearing, no respiratory distress, speaking in full sentences    Skin - warm, moist, no acute rash   HEENT - AT/NC, PERRL, EOMI, no conjunctival injection, moist oral mucosa, o/p clear with no erythema, edema, or exudate, uvula midline, airway patent, neck supple and NT, FROM, no JVD or carotid bruits b/l, no palpable nodes  CV - S1S2, rapid rate, regular rhythm, no m/g/r   Resp - respiration non-labored, CTAB, symmetric bs b/l, no r/r/w  GI - NABS, soft, +abdominal hernia with abdominal binder, NT, no CVAT b/l   MS - w/w/p, no c/c/e, calves supple and NT, distal pulses symmetric b/l   Neuro - AxOx3, no focal neuro deficits, CN II-XII grossly intact

## 2018-03-13 VITALS
HEART RATE: 78 BPM | RESPIRATION RATE: 16 BRPM | OXYGEN SATURATION: 97 % | TEMPERATURE: 98 F | SYSTOLIC BLOOD PRESSURE: 121 MMHG | DIASTOLIC BLOOD PRESSURE: 66 MMHG

## 2018-03-13 LAB
APPEARANCE UR: CLEAR — SIGNIFICANT CHANGE UP
BILIRUB UR-MCNC: NEGATIVE — SIGNIFICANT CHANGE UP
COLOR SPEC: YELLOW — SIGNIFICANT CHANGE UP
DIFF PNL FLD: NEGATIVE — SIGNIFICANT CHANGE UP
GLUCOSE UR QL: NEGATIVE — SIGNIFICANT CHANGE UP
KETONES UR-MCNC: NEGATIVE — SIGNIFICANT CHANGE UP
LEUKOCYTE ESTERASE UR-ACNC: NEGATIVE — SIGNIFICANT CHANGE UP
NITRITE UR-MCNC: NEGATIVE — SIGNIFICANT CHANGE UP
PH UR: 6.5 — SIGNIFICANT CHANGE UP (ref 5–8)
PROT UR-MCNC: NEGATIVE MG/DL — SIGNIFICANT CHANGE UP
SP GR SPEC: 1.01 — SIGNIFICANT CHANGE UP (ref 1–1.03)
TROPONIN I SERPL-MCNC: 0.2 NG/ML — HIGH (ref 0.02–0.06)
TROPONIN I SERPL-MCNC: 0.27 NG/ML — HIGH (ref 0.02–0.06)
UROBILINOGEN FLD QL: 0.2 E.U./DL — SIGNIFICANT CHANGE UP

## 2018-03-13 PROCEDURE — 99217: CPT

## 2018-03-13 RX ORDER — METOPROLOL TARTRATE 50 MG
25 TABLET ORAL ONCE
Qty: 0 | Refills: 0 | Status: COMPLETED | OUTPATIENT
Start: 2018-03-13 | End: 2018-03-13

## 2018-03-13 RX ORDER — ASPIRIN/CALCIUM CARB/MAGNESIUM 324 MG
325 TABLET ORAL ONCE
Qty: 0 | Refills: 0 | Status: COMPLETED | OUTPATIENT
Start: 2018-03-13 | End: 2018-03-13

## 2018-03-13 RX ORDER — ASPIRIN/CALCIUM CARB/MAGNESIUM 324 MG
1 TABLET ORAL
Qty: 30 | Refills: 0
Start: 2018-03-13 | End: 2018-04-11

## 2018-03-13 RX ORDER — METOPROLOL TARTRATE 50 MG
1 TABLET ORAL
Qty: 28 | Refills: 0
Start: 2018-03-13 | End: 2018-03-26

## 2018-03-13 RX ORDER — MAGNESIUM SULFATE 500 MG/ML
1 VIAL (ML) INJECTION ONCE
Qty: 0 | Refills: 0 | Status: COMPLETED | OUTPATIENT
Start: 2018-03-13 | End: 2018-03-13

## 2018-03-13 RX ADMIN — Medication 25 MILLIGRAM(S): at 09:23

## 2018-03-13 RX ADMIN — Medication 100 GRAM(S): at 09:22

## 2018-03-13 RX ADMIN — Medication 325 MILLIGRAM(S): at 03:37

## 2018-03-13 NOTE — CONSULT NOTE ADULT - SUBJECTIVE AND OBJECTIVE BOX
[facility] Cardiology Consultation    CHIEF COMPLAINT: palpitations    HISTORY OF PRESENT ILLNESS: 66 y/o with unremarkable cardiac history who presents secondary to palpitations. Noted to be in SVT. Now in SR.   Notes anxiety at times. She otherwise.     PAST MEDICAL & SURGICAL HISTORY:  Acid reflux  Mastoiditis  Abdominal hernia  Hypothyroid  H/O myomectomy  H/O hernia repair: incisional x 2  History of hysterectomy: partial        Allergies penicillin (Hives)  sulfa drugs (Rash)          MEDICATIONS:  levothyroxine     FAMILY HISTORY:  No pertinent family history in first degree relatives    SOCIAL HISTORY:  no EtOH, tobacco or drug use    REVIEW OF SYSTEMS:  CONSTITUTIONAL: No fever, weight loss, or fatigue  EYES: No eye pain, visual disturbances, or discharge  ENMT:  No difficulty hearing, tinnitus, vertigo; No sinus or throat pain  NECK: No pain or stiffness  BREASTS: No pain, masses, or nipple discharge  RESPIRATORY: No cough, wheezing, chills or hemoptysis; No Shortness of Breath  CARDIOVASCULAR: No chest pain, palpitations, dizziness, or leg swelling  GASTROINTESTINAL: No abdominal or epigastric pain. No nausea, vomiting, or hematemesis; No diarrhea or constipation. No melena or hematochezia.  GENITOURINARY: No dysuria, frequency, hematuria, or incontinence  NEUROLOGICAL: No headaches, memory loss, loss of strength, numbness, or tremors  SKIN: No itching, burning, rashes, or lesions   LYMPH Nodes: No enlarged glands  ENDOCRINE: No heat or cold intolerance; No hair loss  MUSCULOSKELETAL: No joint pain or swelling; No muscle, back, or extremity pain  PSYCHIATRIC: No depression, anxiety, mood swings, or difficulty sleeping  HEME/LYMPH: No easy bruising, or bleeding gums  ALLERY AND IMMUNOLOGIC: No hives or eczema	      PHYSICAL EXAM:  T(C): 36.6 (03-13-18 @ 05:12), Max: 37.3 (03-12-18 @ 22:42)  HR: 83 (03-13-18 @ 05:12) (83 - 225)  BP: 137/77 (03-13-18 @ 05:12) (95/62 - 137/77)  RR: 16 (03-13-18 @ 05:12) (16 - 20)  SpO2: 98% (03-13-18 @ 05:12) (95% - 99%)      Appearance: elderly female NAD	  HEENT:   Normal oral mucosa, PERRL, EOMI	  Lymphatic: No lymphadenopathy  Cardiovascular: Normal S1 S2, No JVD, No murmurs, No edema  Respiratory: Lungs clear to auscultation	  Psychiatry: A & O x 3, Mood & affect appropriate  Gastrointestinal:  Soft, Non-tender, + BS	  Skin: No rashes, No ecchymoses, No cyanosis	  Neurologic: Non-focal  Extremities: Normal range of motion, No clubbing, cyanosis or edema  Vascular: Peripheral pulses palpable 2+ bilaterally  	    ECG:  	 sinus tach    LABS:	 	  CARDIAC MARKERS:  Troponin I, Serum: 0.204 ng/mL (03-13 @ 06:59)  Troponin I, Serum: 0.274 ng/mL (03-13 @ 02:35)  Troponin I, Serum: <0.017 ng/mL (03-12 @ 22:18)                          14.9   12.1  )-----------( 263      ( 12 Mar 2018 22:18 )             45.7     03-12    139  |  106  |  23  ----------------------------<  200<H>  4.2   |  18<L>  |  1.29    Ca    9.0      12 Mar 2018 22:18  Mg     1.8     03-12    TPro  7.0  /  Alb  3.6  /  TBili  0.3  /  DBili  x   /  AST  85<H>  /  ALT  132<H>  /  AlkPhos  83  03-12    proBNP: Serum Pro-Brain Natriuretic Peptide: 23 pg/mL (03-12 @ 22:18)    TSH: Thyroid Stimulating Hormone, Serum: 11.750 uIU/mL (03-12 @ 22:19)      ASSESSMENT/PLAN: 	66 y/o female with SVT and trop leak  1. patient seen and examined, chart reviewed  2. echo c/w preserved LVEF, limited study  3. ASA and metoprolol BID  4. happy to see in the office  5. Dr Hakan chacon    I spent 60 min in care of this patient

## 2018-03-13 NOTE — ED CDU PROVIDER SUBSEQUENT DAY NOTE - PMH
Abdominal hernia    Acid reflux    Hypothyroid    Mastoiditis
Abdominal hernia    Acid reflux    Hypothyroid    Mastoiditis

## 2018-03-13 NOTE — ED CDU PROVIDER SUBSEQUENT DAY NOTE - PSH
H/O hernia repair  incisional x 2  H/O myomectomy    History of hysterectomy  partial
H/O hernia repair  incisional x 2  H/O myomectomy    History of hysterectomy  partial

## 2018-03-13 NOTE — ED ADULT NURSE NOTE - OBJECTIVE STATEMENT
pt c/o sudden chest pain while getting ready for bed last night; felt dizzy and nauseous; medics noted SVT in field and iv placed; was given three doses adenosine prior to arrival 6mg, 12mg then 12mg again; did not break rhythm; 's in triage; pale/nauseous and dizzy; vasal vagal manuevers attempted and unsuccessful; fluids running through iv; given cardizem 10mg given IV; broke SVT rhythm into normal sinus rhythm/sinus tachycardia; patient felt better after rhythm broke

## 2018-03-13 NOTE — ED CDU PROVIDER SUBSEQUENT DAY NOTE - PROGRESS NOTE DETAILS
HR improved but noted intermittently increased to 110s, BP stable, pt asymptomatic and well appearing, will add another 30mg of po cardizem, continue w current management and tele monitoring labs reviewed, CE neg x 1, TSH markedly elevated, rest of labs unremarkable, will continue cardiac monitoring and current management Pt seen and evaluated immediately upon arrival due to high risk of imminent life threatening condition.  Placed on cardiac monitor and pacer pads immediately. Noted new onset SVT with HR in 220s with CP, near syncope sensation, BP tenuous with IVF wide open, pt stable otherwise, HR sustaining in 220s, attempted carotid massage and vagal maneuvers without successful, BP improved s/p IVF and given cardizem IV and PO with successful reversion of SVT back to NSR with IRBBB, cardiology consulted, will keep in obs for further monitoring and serial labs BP improved to 130/80s, will give dose of IV cardizem 10mg for rate control second Trop elevated at 0.238, EKG with no ischemic changes noted, cp free and rate controlled, likely trop leak 2/2 SVT earlier.  given dose of ASA 325mg and continue current management and cardiac monitoring

## 2018-03-13 NOTE — ED CDU PROVIDER SUBSEQUENT DAY NOTE - PROGRESS NOTE DETAILS
patient seen by Dr. Shelton cardiology, HR low 90s, he recommended to give dose of metoprolol 25mg in ED, HR 80s-90s after metoprolol administration, Dr. PORTER recommended rx home with metoprolol 25mg BID, baby asa daily, he will see patient in office on friday, 3/18, also f/u PMD Dr. Mcclendon, return precautions discussed, all questions answered, will dc.

## 2018-03-13 NOTE — ED ADULT NURSE REASSESSMENT NOTE - NS ED NURSE REASSESS COMMENT FT1
Received pt alert and oriented x3, breathing even and unlabored no distress noted. No complaint of chest pain or discomfort. HR 91 on cardiac monitor, sinus rhythm Pt evaluated by cardiology at this time. Will continue to monitor, pending disposition.

## 2018-03-16 ENCOUNTER — APPOINTMENT (OUTPATIENT)
Dept: HEART AND VASCULAR | Facility: CLINIC | Age: 68
End: 2018-03-16
Payer: MEDICARE

## 2018-03-16 ENCOUNTER — NON-APPOINTMENT (OUTPATIENT)
Age: 68
End: 2018-03-16

## 2018-03-16 VITALS
OXYGEN SATURATION: 98 % | HEIGHT: 65 IN | DIASTOLIC BLOOD PRESSURE: 82 MMHG | HEART RATE: 80 BPM | SYSTOLIC BLOOD PRESSURE: 120 MMHG

## 2018-03-16 DIAGNOSIS — Z87.891 PERSONAL HISTORY OF NICOTINE DEPENDENCE: ICD-10-CM

## 2018-03-16 PROCEDURE — 99214 OFFICE O/P EST MOD 30 MIN: CPT | Mod: 25

## 2018-03-16 PROCEDURE — 93000 ELECTROCARDIOGRAM COMPLETE: CPT

## 2018-03-16 RX ORDER — VALACYCLOVIR 500 MG/1
500 TABLET, FILM COATED ORAL TWICE DAILY
Qty: 28 | Refills: 0 | Status: COMPLETED | COMMUNITY
Start: 2017-10-31 | End: 2018-03-16

## 2018-03-16 RX ORDER — CLINDAMYCIN HYDROCHLORIDE 300 MG/1
300 CAPSULE ORAL EVERY 6 HOURS
Qty: 28 | Refills: 0 | Status: COMPLETED | COMMUNITY
Start: 2017-11-06 | End: 2018-03-16

## 2018-03-16 RX ORDER — NYSTATIN 100000 1/G
100000 POWDER TOPICAL
Qty: 30 | Refills: 0 | Status: COMPLETED | COMMUNITY
Start: 2017-09-05 | End: 2018-03-16

## 2018-03-16 RX ORDER — CEFADROXIL 500 MG/1
500 CAPSULE ORAL
Qty: 14 | Refills: 0 | Status: COMPLETED | COMMUNITY
Start: 2017-10-13 | End: 2018-03-16

## 2018-03-16 RX ORDER — HYDROCODONE BITARTRATE AND ACETAMINOPHEN 5; 325 MG/1; MG/1
5-325 TABLET ORAL
Qty: 18 | Refills: 0 | Status: COMPLETED | COMMUNITY
Start: 2017-10-13 | End: 2018-03-16

## 2018-03-16 RX ORDER — NYSTATIN 100MM UNIT
POWDER (EA) MISCELLANEOUS
Qty: 1 | Refills: 0 | Status: COMPLETED | COMMUNITY
Start: 2017-09-05 | End: 2018-03-16

## 2018-03-16 RX ORDER — PROMETHAZINE HYDROCHLORIDE 25 MG/1
25 TABLET ORAL DAILY
Refills: 0 | Status: COMPLETED | COMMUNITY
End: 2018-03-16

## 2018-03-16 RX ORDER — ONDANSETRON HYDROCHLORIDE 4 MG/1
4 TABLET, FILM COATED ORAL DAILY
Refills: 0 | Status: COMPLETED | COMMUNITY
End: 2018-03-16

## 2018-03-16 RX ORDER — AZITHROMYCIN 250 MG/1
250 TABLET, FILM COATED ORAL
Qty: 6 | Refills: 1 | Status: COMPLETED | COMMUNITY
Start: 2017-12-04 | End: 2018-03-16

## 2018-03-20 ENCOUNTER — APPOINTMENT (OUTPATIENT)
Dept: HEART AND VASCULAR | Facility: CLINIC | Age: 68
End: 2018-03-20
Payer: MEDICARE

## 2018-03-20 PROCEDURE — 93306 TTE W/DOPPLER COMPLETE: CPT

## 2018-03-26 ENCOUNTER — NON-APPOINTMENT (OUTPATIENT)
Age: 68
End: 2018-03-26

## 2018-03-26 ENCOUNTER — APPOINTMENT (OUTPATIENT)
Dept: HEART AND VASCULAR | Facility: CLINIC | Age: 68
End: 2018-03-26
Payer: MEDICARE

## 2018-03-26 VITALS
DIASTOLIC BLOOD PRESSURE: 66 MMHG | OXYGEN SATURATION: 98 % | WEIGHT: 214 LBS | BODY MASS INDEX: 35.65 KG/M2 | HEART RATE: 104 BPM | HEIGHT: 65 IN | SYSTOLIC BLOOD PRESSURE: 96 MMHG

## 2018-03-26 PROCEDURE — 93000 ELECTROCARDIOGRAM COMPLETE: CPT

## 2018-03-26 PROCEDURE — 99215 OFFICE O/P EST HI 40 MIN: CPT | Mod: 25

## 2018-03-30 ENCOUNTER — APPOINTMENT (OUTPATIENT)
Dept: INTERNAL MEDICINE | Facility: CLINIC | Age: 68
End: 2018-03-30
Payer: MEDICARE

## 2018-03-30 VITALS
TEMPERATURE: 98.9 F | OXYGEN SATURATION: 99 % | RESPIRATION RATE: 12 BRPM | DIASTOLIC BLOOD PRESSURE: 80 MMHG | SYSTOLIC BLOOD PRESSURE: 122 MMHG | HEART RATE: 74 BPM

## 2018-03-30 DIAGNOSIS — M70.72 OTHER BURSITIS OF HIP, LEFT HIP: ICD-10-CM

## 2018-03-30 DIAGNOSIS — Z86.19 PERSONAL HISTORY OF OTHER INFECTIOUS AND PARASITIC DISEASES: ICD-10-CM

## 2018-03-30 DIAGNOSIS — K21.9 GASTRO-ESOPHAGEAL REFLUX DISEASE W/OUT ESOPHAGITIS: ICD-10-CM

## 2018-03-30 PROCEDURE — 99214 OFFICE O/P EST MOD 30 MIN: CPT

## 2018-03-30 RX ORDER — OMEPRAZOLE 20 MG/1
20 CAPSULE, DELAYED RELEASE ORAL
Qty: 30 | Refills: 0 | Status: DISCONTINUED | COMMUNITY
Start: 2017-01-04 | End: 2018-03-30

## 2018-04-02 ENCOUNTER — APPOINTMENT (OUTPATIENT)
Dept: HEART AND VASCULAR | Facility: CLINIC | Age: 68
End: 2018-04-02

## 2018-04-05 ENCOUNTER — APPOINTMENT (OUTPATIENT)
Dept: INTERNAL MEDICINE | Facility: CLINIC | Age: 68
End: 2018-04-05
Payer: MEDICARE

## 2018-04-05 VITALS
TEMPERATURE: 98.3 F | BODY MASS INDEX: 36.49 KG/M2 | HEART RATE: 98 BPM | SYSTOLIC BLOOD PRESSURE: 110 MMHG | DIASTOLIC BLOOD PRESSURE: 70 MMHG | OXYGEN SATURATION: 98 % | HEIGHT: 65 IN | WEIGHT: 219 LBS

## 2018-04-05 PROCEDURE — 99214 OFFICE O/P EST MOD 30 MIN: CPT | Mod: 25

## 2018-04-05 RX ORDER — ACETAMINOPHEN 325 MG/1
325 TABLET ORAL DAILY
Refills: 0 | Status: DISCONTINUED | COMMUNITY
End: 2018-04-05

## 2018-04-05 RX ORDER — MUPIROCIN 2 %
2 OINTMENT (GRAM) TOPICAL 3 TIMES DAILY
Refills: 0 | Status: DISCONTINUED | COMMUNITY
End: 2018-04-05

## 2018-04-05 RX ORDER — CIPROFLOXACIN AND DEXAMETHASONE 3; 1 MG/ML; MG/ML
0.3-0.1 SUSPENSION/ DROPS AURICULAR (OTIC)
Qty: 1 | Refills: 1 | Status: DISCONTINUED | COMMUNITY
Start: 2017-11-21 | End: 2018-04-05

## 2018-04-05 RX ORDER — DEXAMETHASONE 2 MG/1
2 TABLET ORAL
Qty: 36 | Refills: 0 | Status: DISCONTINUED | COMMUNITY
Start: 2017-10-13 | End: 2018-04-05

## 2018-04-05 RX ORDER — ALPRAZOLAM 0.25 MG/1
0.25 TABLET ORAL
Qty: 90 | Refills: 0 | Status: DISCONTINUED | COMMUNITY
Start: 2017-09-27 | End: 2018-04-05

## 2018-04-05 RX ORDER — METOPROLOL TARTRATE 25 MG/1
25 TABLET, FILM COATED ORAL
Qty: 30 | Refills: 1 | Status: DISCONTINUED | COMMUNITY
Start: 2018-03-16 | End: 2018-04-05

## 2018-04-05 RX ORDER — LEVOTHYROXINE SODIUM 150 UG/1
150 TABLET ORAL
Refills: 0 | Status: ACTIVE | COMMUNITY
Start: 2017-09-26

## 2018-04-06 ENCOUNTER — APPOINTMENT (OUTPATIENT)
Dept: HEART AND VASCULAR | Facility: CLINIC | Age: 68
End: 2018-04-06

## 2018-04-09 ENCOUNTER — APPOINTMENT (OUTPATIENT)
Dept: OTOLARYNGOLOGY | Facility: CLINIC | Age: 68
End: 2018-04-09
Payer: MEDICARE

## 2018-04-09 VITALS
DIASTOLIC BLOOD PRESSURE: 80 MMHG | SYSTOLIC BLOOD PRESSURE: 128 MMHG | HEIGHT: 65 IN | OXYGEN SATURATION: 98 % | WEIGHT: 216 LBS | TEMPERATURE: 98 F | HEART RATE: 88 BPM | BODY MASS INDEX: 35.99 KG/M2

## 2018-04-09 DIAGNOSIS — G96.0 CEREBROSPINAL FLUID LEAK: ICD-10-CM

## 2018-04-09 PROCEDURE — 99213 OFFICE O/P EST LOW 20 MIN: CPT

## 2018-04-10 ENCOUNTER — APPOINTMENT (OUTPATIENT)
Dept: HEART AND VASCULAR | Facility: CLINIC | Age: 68
End: 2018-04-10
Payer: MEDICARE

## 2018-04-10 VITALS
SYSTOLIC BLOOD PRESSURE: 137 MMHG | WEIGHT: 216 LBS | DIASTOLIC BLOOD PRESSURE: 63 MMHG | HEART RATE: 92 BPM | BODY MASS INDEX: 35.99 KG/M2 | HEIGHT: 65 IN

## 2018-04-10 PROCEDURE — 99205 OFFICE O/P NEW HI 60 MIN: CPT | Mod: 25

## 2018-04-10 PROCEDURE — 93000 ELECTROCARDIOGRAM COMPLETE: CPT

## 2018-04-11 PROBLEM — G96.0 CSF LEAK: Status: ACTIVE | Noted: 2017-09-19

## 2018-04-20 ENCOUNTER — APPOINTMENT (OUTPATIENT)
Dept: HEART AND VASCULAR | Facility: CLINIC | Age: 68
End: 2018-04-20

## 2018-05-01 ENCOUNTER — LABORATORY RESULT (OUTPATIENT)
Age: 68
End: 2018-05-01

## 2018-05-02 ENCOUNTER — APPOINTMENT (OUTPATIENT)
Dept: DERMATOLOGY | Facility: CLINIC | Age: 68
End: 2018-05-02
Payer: MEDICARE

## 2018-05-02 VITALS — DIASTOLIC BLOOD PRESSURE: 63 MMHG | SYSTOLIC BLOOD PRESSURE: 118 MMHG

## 2018-05-02 DIAGNOSIS — D23.9 OTHER BENIGN NEOPLASM OF SKIN, UNSPECIFIED: ICD-10-CM

## 2018-05-02 DIAGNOSIS — L73.8 OTHER SPECIFIED FOLLICULAR DISORDERS: ICD-10-CM

## 2018-05-02 DIAGNOSIS — Z12.83 ENCOUNTER FOR SCREENING FOR MALIGNANT NEOPLASM OF SKIN: ICD-10-CM

## 2018-05-02 DIAGNOSIS — D48.9 NEOPLASM OF UNCERTAIN BEHAVIOR, UNSPECIFIED: ICD-10-CM

## 2018-05-02 PROCEDURE — 99203 OFFICE O/P NEW LOW 30 MIN: CPT | Mod: 25

## 2018-05-02 PROCEDURE — 11100 BX SKIN SUBCUTANEOUS&/MUCOUS MEMBRANE 1 LESION: CPT

## 2018-05-02 PROCEDURE — 11101 BIOPSY SKIN SUBQ&/MUCOUS MEMBRANE EA ADDL LESN: CPT | Mod: 59

## 2018-05-03 ENCOUNTER — APPOINTMENT (OUTPATIENT)
Dept: DERMATOLOGY | Facility: CLINIC | Age: 68
End: 2018-05-03

## 2018-05-10 ENCOUNTER — APPOINTMENT (OUTPATIENT)
Dept: INTERNAL MEDICINE | Facility: CLINIC | Age: 68
End: 2018-05-10
Payer: MEDICARE

## 2018-05-10 VITALS
WEIGHT: 214 LBS | HEIGHT: 65 IN | DIASTOLIC BLOOD PRESSURE: 68 MMHG | TEMPERATURE: 98.4 F | HEART RATE: 87 BPM | BODY MASS INDEX: 35.65 KG/M2 | SYSTOLIC BLOOD PRESSURE: 108 MMHG | OXYGEN SATURATION: 98 %

## 2018-05-10 DIAGNOSIS — J34.89 OTHER SPECIFIED DISORDERS OF NOSE AND NASAL SINUSES: ICD-10-CM

## 2018-05-10 PROCEDURE — 99214 OFFICE O/P EST MOD 30 MIN: CPT

## 2018-05-11 ENCOUNTER — APPOINTMENT (OUTPATIENT)
Dept: HEART AND VASCULAR | Facility: CLINIC | Age: 68
End: 2018-05-11

## 2018-05-11 ENCOUNTER — APPOINTMENT (OUTPATIENT)
Dept: HEART AND VASCULAR | Facility: CLINIC | Age: 68
End: 2018-05-11
Payer: MEDICARE

## 2018-05-11 DIAGNOSIS — I45.10 UNSPECIFIED RIGHT BUNDLE-BRANCH BLOCK: ICD-10-CM

## 2018-05-11 DIAGNOSIS — R06.09 OTHER FORMS OF DYSPNEA: ICD-10-CM

## 2018-05-11 PROCEDURE — 93015 CV STRESS TEST SUPVJ I&R: CPT

## 2018-05-13 PROBLEM — I45.10 RIGHT BUNDLE-BRANCH BLOCK: Status: ACTIVE | Noted: 2018-04-13

## 2018-05-13 PROBLEM — R06.09 DYSPNEA ON EXERTION: Status: ACTIVE | Noted: 2018-05-13

## 2018-05-22 ENCOUNTER — MOBILE ON CALL (OUTPATIENT)
Age: 68
End: 2018-05-22

## 2018-06-01 ENCOUNTER — APPOINTMENT (OUTPATIENT)
Dept: INTERNAL MEDICINE | Facility: CLINIC | Age: 68
End: 2018-06-01
Payer: MEDICARE

## 2018-06-01 VITALS
HEIGHT: 65 IN | TEMPERATURE: 98.4 F | SYSTOLIC BLOOD PRESSURE: 118 MMHG | WEIGHT: 211 LBS | OXYGEN SATURATION: 98 % | DIASTOLIC BLOOD PRESSURE: 72 MMHG | BODY MASS INDEX: 35.16 KG/M2 | HEART RATE: 95 BPM

## 2018-06-01 DIAGNOSIS — F41.9 ANXIETY DISORDER, UNSPECIFIED: ICD-10-CM

## 2018-06-01 DIAGNOSIS — E78.5 HYPERLIPIDEMIA, UNSPECIFIED: ICD-10-CM

## 2018-06-01 DIAGNOSIS — M76.891 OTHER SPECIFIED ENTHESOPATHIES OF RIGHT LOWER LIMB, EXCLUDING FOOT: ICD-10-CM

## 2018-06-01 PROCEDURE — 99214 OFFICE O/P EST MOD 30 MIN: CPT

## 2018-06-01 NOTE — HEALTH RISK ASSESSMENT
[No falls in past year] : Patient reported no falls in the past year [0] : 2) Feeling down, depressed, or hopeless: Not at all (0) [] : No [[id number]] : 0

## 2018-06-01 NOTE — HISTORY OF PRESENT ILLNESS
[FreeTextEntry1] : Here for follow up.\par C/O: Right knee pain and fear of recurrence of SVT [de-identified] : This is a 68yo F who presents with complaints of right knee pain on the medial side.  She states she started doing Miguelangel Chi and thinks she may have injured it during her session.  She has been taking tylenol without much relief.  She states it hurts more when she lies on her right or left side.  She has tried ice and heat.  Nothing seems to help.  She feels it is below the knee but does not remember a particular injury.  \par She had her labs drawn at her last cardiology visit.  Her TG are 382.  She was not fasting.  She ate hooker and eggs.\par Her cholesterol was 211.\par HDL was 38\par LDL was 97.\par \par Her A1C was 5.6.  She lost 5 pounds in a month.  She cut out sugar and carbs.  \par Her stress test was normal.\par She saw Dr. Castanon - she had one episode of SVT.  Dr. Castanon wants to do an ablation since the metoprolol has not helped her and made her light headed.  She saw another cardiologist.  She had an echo stress test for 4.5 minutes.  She will get the results.  She will see him in another week.  \par

## 2018-06-01 NOTE — COUNSELING
[Weight management counseling provided] : Weight management [Healthy eating counseling provided] : healthy eating [Activity counseling provided] : activity [Good understanding] : Patient has a good understanding of lifestyle changes and the steps needed to achieve self management goals

## 2018-06-12 ENCOUNTER — APPOINTMENT (OUTPATIENT)
Dept: ORTHOPEDIC SURGERY | Facility: CLINIC | Age: 68
End: 2018-06-12
Payer: MEDICARE

## 2018-06-12 VITALS — HEIGHT: 65 IN | WEIGHT: 211 LBS | BODY MASS INDEX: 35.16 KG/M2 | RESPIRATION RATE: 16 BRPM

## 2018-06-12 DIAGNOSIS — M25.561 PAIN IN RIGHT KNEE: ICD-10-CM

## 2018-06-12 DIAGNOSIS — Z80.9 FAMILY HISTORY OF MALIGNANT NEOPLASM, UNSPECIFIED: ICD-10-CM

## 2018-06-12 DIAGNOSIS — Z86.79 PERSONAL HISTORY OF OTHER DISEASES OF THE CIRCULATORY SYSTEM: ICD-10-CM

## 2018-06-12 PROCEDURE — 99203 OFFICE O/P NEW LOW 30 MIN: CPT

## 2018-06-13 PROBLEM — Z86.79 HISTORY OF CARDIAC DISORDER: Status: RESOLVED | Noted: 2018-06-12 | Resolved: 2018-06-13

## 2018-06-13 PROBLEM — Z80.9 FAMILY HISTORY OF MALIGNANT NEOPLASM: Status: ACTIVE | Noted: 2018-06-12

## 2018-06-25 ENCOUNTER — FORM ENCOUNTER (OUTPATIENT)
Age: 68
End: 2018-06-25

## 2018-06-26 ENCOUNTER — APPOINTMENT (OUTPATIENT)
Dept: RADIOLOGY | Facility: CLINIC | Age: 68
End: 2018-06-26

## 2018-06-26 ENCOUNTER — APPOINTMENT (OUTPATIENT)
Dept: ORTHOPEDIC SURGERY | Facility: CLINIC | Age: 68
End: 2018-06-26
Payer: MEDICARE

## 2018-06-26 ENCOUNTER — OUTPATIENT (OUTPATIENT)
Dept: OUTPATIENT SERVICES | Facility: HOSPITAL | Age: 68
LOS: 1 days | End: 2018-06-26
Payer: MEDICARE

## 2018-06-26 VITALS — HEIGHT: 65 IN | BODY MASS INDEX: 35.16 KG/M2 | RESPIRATION RATE: 16 BRPM | WEIGHT: 211 LBS

## 2018-06-26 DIAGNOSIS — Z90.710 ACQUIRED ABSENCE OF BOTH CERVIX AND UTERUS: Chronic | ICD-10-CM

## 2018-06-26 DIAGNOSIS — Z98.890 OTHER SPECIFIED POSTPROCEDURAL STATES: Chronic | ICD-10-CM

## 2018-06-26 PROCEDURE — 73564 X-RAY EXAM KNEE 4 OR MORE: CPT

## 2018-06-26 PROCEDURE — 20550 NJX 1 TENDON SHEATH/LIGAMENT: CPT | Mod: RT

## 2018-06-26 PROCEDURE — 73564 X-RAY EXAM KNEE 4 OR MORE: CPT | Mod: 26,RT

## 2018-06-26 PROCEDURE — 99213 OFFICE O/P EST LOW 20 MIN: CPT | Mod: 25

## 2018-07-06 ENCOUNTER — APPOINTMENT (OUTPATIENT)
Dept: INTERNAL MEDICINE | Facility: CLINIC | Age: 68
End: 2018-07-06
Payer: MEDICARE

## 2018-07-06 ENCOUNTER — TRANSCRIPTION ENCOUNTER (OUTPATIENT)
Age: 68
End: 2018-07-06

## 2018-07-06 VITALS
DIASTOLIC BLOOD PRESSURE: 75 MMHG | TEMPERATURE: 99.5 F | HEIGHT: 65 IN | BODY MASS INDEX: 35.49 KG/M2 | WEIGHT: 213 LBS | HEART RATE: 98 BPM | OXYGEN SATURATION: 97 % | SYSTOLIC BLOOD PRESSURE: 116 MMHG

## 2018-07-06 DIAGNOSIS — E03.8 OTHER SPECIFIED HYPOTHYROIDISM: ICD-10-CM

## 2018-07-06 DIAGNOSIS — E06.3 OTHER SPECIFIED HYPOTHYROIDISM: ICD-10-CM

## 2018-07-06 DIAGNOSIS — B00.9 HERPESVIRAL INFECTION, UNSPECIFIED: ICD-10-CM

## 2018-07-06 DIAGNOSIS — S02.5XXA FRACTURE OF TOOTH (TRAUMATIC), INITIAL ENCOUNTER FOR CLOSED FRACTURE: ICD-10-CM

## 2018-07-06 DIAGNOSIS — I47.1 SUPRAVENTRICULAR TACHYCARDIA: ICD-10-CM

## 2018-07-06 DIAGNOSIS — Z87.898 PERSONAL HISTORY OF OTHER SPECIFIED CONDITIONS: ICD-10-CM

## 2018-07-06 DIAGNOSIS — K46.9 UNSPECIFIED ABDOMINAL HERNIA W/OUT OBSTRUCTION OR GANGRENE: ICD-10-CM

## 2018-07-06 PROCEDURE — 99214 OFFICE O/P EST MOD 30 MIN: CPT

## 2018-07-06 NOTE — HISTORY OF PRESENT ILLNESS
[FreeTextEntry1] : Here for follow up.\par S/P right knee injection\par Fractured Tooth\par F/U SVT [de-identified] : This is a 68yo F who presents for follow up.\par She is now going to PT for the knee and is s/p steroid injection.\par She saw Dr. Carson Spain at [facility], an EP physician.  She scheduled the ablation.  She was put on cardizem BID and she feels much better.  \par She had a root canal.  She found a fracture in the tooth.  She will have the tooth removed.\par Lastly, she has the lesion in her buttock fold again.  She has not put anything on it.

## 2018-07-06 NOTE — HEALTH RISK ASSESSMENT
[No falls in past year] : Patient reported no falls in the past year [0] : 2) Feeling down, depressed, or hopeless: Not at all (0) [] : No [[id number]] : 0

## 2018-07-19 ENCOUNTER — APPOINTMENT (OUTPATIENT)
Dept: ORTHOPEDIC SURGERY | Facility: CLINIC | Age: 68
End: 2018-07-19
Payer: MEDICARE

## 2018-07-19 VITALS — BODY MASS INDEX: 35.49 KG/M2 | HEIGHT: 65 IN | WEIGHT: 213 LBS | RESPIRATION RATE: 16 BRPM

## 2018-07-19 PROCEDURE — 99213 OFFICE O/P EST LOW 20 MIN: CPT

## 2018-07-23 PROBLEM — Z78.9 ALCOHOL USE: Status: ACTIVE | Noted: 2017-09-08

## 2018-09-18 PROBLEM — K46.9 UNSPECIFIED ABDOMINAL HERNIA WITHOUT OBSTRUCTION OR GANGRENE: Chronic | Status: ACTIVE | Noted: 2017-07-20

## 2018-09-18 PROBLEM — E03.9 HYPOTHYROIDISM, UNSPECIFIED: Chronic | Status: ACTIVE | Noted: 2017-07-20

## 2018-09-24 ENCOUNTER — APPOINTMENT (OUTPATIENT)
Dept: FAMILY MEDICINE | Facility: CLINIC | Age: 68
End: 2018-09-24

## 2018-09-26 ENCOUNTER — FORM ENCOUNTER (OUTPATIENT)
Age: 68
End: 2018-09-26

## 2018-09-27 ENCOUNTER — APPOINTMENT (OUTPATIENT)
Dept: ORTHOPEDIC SURGERY | Facility: CLINIC | Age: 68
End: 2018-09-27
Payer: MEDICARE

## 2018-09-27 ENCOUNTER — APPOINTMENT (OUTPATIENT)
Dept: RADIOLOGY | Facility: CLINIC | Age: 68
End: 2018-09-27

## 2018-09-27 ENCOUNTER — OUTPATIENT (OUTPATIENT)
Dept: OUTPATIENT SERVICES | Facility: HOSPITAL | Age: 68
LOS: 1 days | End: 2018-09-27
Payer: MEDICARE

## 2018-09-27 VITALS — BODY MASS INDEX: 35.49 KG/M2 | HEIGHT: 65 IN | RESPIRATION RATE: 16 BRPM | WEIGHT: 213 LBS

## 2018-09-27 DIAGNOSIS — Z98.890 OTHER SPECIFIED POSTPROCEDURAL STATES: Chronic | ICD-10-CM

## 2018-09-27 DIAGNOSIS — Z90.710 ACQUIRED ABSENCE OF BOTH CERVIX AND UTERUS: Chronic | ICD-10-CM

## 2018-09-27 PROCEDURE — 72190 X-RAY EXAM OF PELVIS: CPT

## 2018-09-27 PROCEDURE — 99214 OFFICE O/P EST MOD 30 MIN: CPT | Mod: 25

## 2018-09-27 PROCEDURE — 72190 X-RAY EXAM OF PELVIS: CPT | Mod: 26

## 2018-09-27 PROCEDURE — 20611 DRAIN/INJ JOINT/BURSA W/US: CPT | Mod: RT

## 2018-09-28 ENCOUNTER — APPOINTMENT (OUTPATIENT)
Dept: INTERNAL MEDICINE | Facility: CLINIC | Age: 68
End: 2018-09-28
Payer: MEDICARE

## 2018-09-28 VITALS — TEMPERATURE: 97.6 F

## 2018-09-28 DIAGNOSIS — Z79.899 OTHER LONG TERM (CURRENT) DRUG THERAPY: ICD-10-CM

## 2018-09-28 DIAGNOSIS — Z23 ENCOUNTER FOR IMMUNIZATION: ICD-10-CM

## 2018-09-28 PROCEDURE — G0009: CPT

## 2018-09-28 PROCEDURE — 99213 OFFICE O/P EST LOW 20 MIN: CPT | Mod: 25

## 2018-09-28 PROCEDURE — 90670 PCV13 VACCINE IM: CPT

## 2018-09-28 RX ORDER — OMEPRAZOLE 40 MG/1
40 CAPSULE, DELAYED RELEASE ORAL
Qty: 30 | Refills: 5 | Status: DISCONTINUED | COMMUNITY
Start: 2018-03-30 | End: 2018-09-28

## 2018-09-28 RX ORDER — DICLOFENAC SODIUM 10 MG/G
1 GEL TOPICAL DAILY
Qty: 1 | Refills: 0 | Status: DISCONTINUED | COMMUNITY
Start: 2018-07-19 | End: 2018-09-28

## 2018-09-28 RX ORDER — CLINDAMYCIN HYDROCHLORIDE 150 MG/1
150 CAPSULE ORAL
Qty: 28 | Refills: 0 | Status: DISCONTINUED | COMMUNITY
Start: 2018-07-02 | End: 2018-09-28

## 2018-10-04 ENCOUNTER — FORM ENCOUNTER (OUTPATIENT)
Age: 68
End: 2018-10-04

## 2018-10-05 ENCOUNTER — OUTPATIENT (OUTPATIENT)
Dept: OUTPATIENT SERVICES | Facility: HOSPITAL | Age: 68
LOS: 1 days | End: 2018-10-05

## 2018-10-05 ENCOUNTER — APPOINTMENT (OUTPATIENT)
Dept: MRI IMAGING | Facility: CLINIC | Age: 68
End: 2018-10-05
Payer: MEDICARE

## 2018-10-05 DIAGNOSIS — Z90.710 ACQUIRED ABSENCE OF BOTH CERVIX AND UTERUS: Chronic | ICD-10-CM

## 2018-10-05 DIAGNOSIS — Z98.890 OTHER SPECIFIED POSTPROCEDURAL STATES: Chronic | ICD-10-CM

## 2018-10-05 PROCEDURE — 73721 MRI JNT OF LWR EXTRE W/O DYE: CPT | Mod: 26,RT

## 2018-10-09 ENCOUNTER — APPOINTMENT (OUTPATIENT)
Dept: ORTHOPEDIC SURGERY | Facility: CLINIC | Age: 68
End: 2018-10-09
Payer: MEDICARE

## 2018-10-09 VITALS — RESPIRATION RATE: 16 BRPM | WEIGHT: 213 LBS | BODY MASS INDEX: 35.49 KG/M2 | HEIGHT: 65 IN

## 2018-10-09 DIAGNOSIS — S83.241A OTHER TEAR OF MEDIAL MENISCUS, CURRENT INJURY, RIGHT KNEE, INITIAL ENCOUNTER: ICD-10-CM

## 2018-10-09 DIAGNOSIS — M25.561 PAIN IN RIGHT KNEE: ICD-10-CM

## 2018-10-09 PROCEDURE — 99214 OFFICE O/P EST MOD 30 MIN: CPT

## 2018-10-22 ENCOUNTER — APPOINTMENT (OUTPATIENT)
Dept: OTOLARYNGOLOGY | Facility: CLINIC | Age: 68
End: 2018-10-22
Payer: MEDICARE

## 2018-10-22 VITALS — SYSTOLIC BLOOD PRESSURE: 107 MMHG | OXYGEN SATURATION: 85 % | DIASTOLIC BLOOD PRESSURE: 71 MMHG | HEART RATE: 85 BPM

## 2018-10-22 DIAGNOSIS — H90.11 CONDUCTIVE HEARING LOSS, UNILATERAL, RIGHT EAR, WITH UNRESTRICTED HEARING ON THE CONTRALATERAL SIDE: ICD-10-CM

## 2018-10-22 DIAGNOSIS — H75.81 OTHER SPECIFIED DISORDERS OF RIGHT MIDDLE EAR AND MASTOID IN DISEASES CLASSIFIED ELSEWHERE: ICD-10-CM

## 2018-10-22 PROCEDURE — 92557 COMPREHENSIVE HEARING TEST: CPT

## 2018-10-22 PROCEDURE — 92511 NASOPHARYNGOSCOPY: CPT

## 2018-10-22 PROCEDURE — 99214 OFFICE O/P EST MOD 30 MIN: CPT | Mod: 25

## 2018-10-22 PROCEDURE — 92550 TYMPANOMETRY & REFLEX THRESH: CPT

## 2018-10-25 ENCOUNTER — FORM ENCOUNTER (OUTPATIENT)
Age: 68
End: 2018-10-25

## 2018-10-26 ENCOUNTER — APPOINTMENT (OUTPATIENT)
Dept: CT IMAGING | Facility: CLINIC | Age: 68
End: 2018-10-26
Payer: MEDICARE

## 2018-10-26 ENCOUNTER — OUTPATIENT (OUTPATIENT)
Dept: OUTPATIENT SERVICES | Facility: HOSPITAL | Age: 68
LOS: 1 days | End: 2018-10-26
Payer: MEDICARE

## 2018-10-26 DIAGNOSIS — Z98.890 OTHER SPECIFIED POSTPROCEDURAL STATES: Chronic | ICD-10-CM

## 2018-10-26 DIAGNOSIS — Z90.710 ACQUIRED ABSENCE OF BOTH CERVIX AND UTERUS: Chronic | ICD-10-CM

## 2018-10-26 PROCEDURE — 70480 CT ORBIT/EAR/FOSSA W/O DYE: CPT

## 2018-10-26 PROCEDURE — 70480 CT ORBIT/EAR/FOSSA W/O DYE: CPT | Mod: 26

## 2018-10-29 ENCOUNTER — APPOINTMENT (OUTPATIENT)
Dept: ULTRASOUND IMAGING | Facility: CLINIC | Age: 68
End: 2018-10-29
Payer: MEDICARE

## 2018-10-31 ENCOUNTER — APPOINTMENT (OUTPATIENT)
Dept: OTOLARYNGOLOGY | Facility: CLINIC | Age: 68
End: 2018-10-31
Payer: MEDICARE

## 2018-10-31 VITALS
OXYGEN SATURATION: 98 % | TEMPERATURE: 98.1 F | HEART RATE: 96 BPM | SYSTOLIC BLOOD PRESSURE: 136 MMHG | DIASTOLIC BLOOD PRESSURE: 71 MMHG

## 2018-10-31 DIAGNOSIS — H92.01 OTALGIA, RIGHT EAR: ICD-10-CM

## 2018-10-31 PROCEDURE — 99213 OFFICE O/P EST LOW 20 MIN: CPT

## 2018-11-05 ENCOUNTER — APPOINTMENT (OUTPATIENT)
Dept: ULTRASOUND IMAGING | Facility: CLINIC | Age: 68
End: 2018-11-05
Payer: MEDICARE

## 2018-11-05 ENCOUNTER — OUTPATIENT (OUTPATIENT)
Dept: OUTPATIENT SERVICES | Facility: HOSPITAL | Age: 68
LOS: 1 days | End: 2018-11-05
Payer: MEDICARE

## 2018-11-05 ENCOUNTER — APPOINTMENT (OUTPATIENT)
Dept: MAMMOGRAPHY | Facility: CLINIC | Age: 68
End: 2018-11-05
Payer: MEDICARE

## 2018-11-05 DIAGNOSIS — Z98.890 OTHER SPECIFIED POSTPROCEDURAL STATES: Chronic | ICD-10-CM

## 2018-11-05 DIAGNOSIS — Z90.710 ACQUIRED ABSENCE OF BOTH CERVIX AND UTERUS: Chronic | ICD-10-CM

## 2018-11-05 PROCEDURE — 77067 SCR MAMMO BI INCL CAD: CPT | Mod: 26

## 2018-11-05 PROCEDURE — 76641 ULTRASOUND BREAST COMPLETE: CPT | Mod: 26,50

## 2018-11-05 PROCEDURE — 77063 BREAST TOMOSYNTHESIS BI: CPT | Mod: 26

## 2018-11-05 PROCEDURE — 76641 ULTRASOUND BREAST COMPLETE: CPT

## 2018-11-05 PROCEDURE — 77067 SCR MAMMO BI INCL CAD: CPT

## 2018-11-05 PROCEDURE — 77063 BREAST TOMOSYNTHESIS BI: CPT

## 2018-12-06 NOTE — ED ADULT NURSE NOTE - CCCP TRG CHIEF CMPLNT
fever Melolabial Interpolation Flap Text: A decision was made to reconstruct the defect utilizing an interpolation axial flap and a staged reconstruction.  A telfa template was made of the defect.  This telfa template was then used to outline the melolabial interpolation flap.  The donor area for the pedicle flap was then injected with anesthesia.  The flap was excised through the skin and subcutaneous tissue down to the layer of the underlying musculature.  The pedicle flap was carefully excised within this deep plane to maintain its blood supply.  The edges of the donor site were undermined.   The donor site was closed in a primary fashion.  The pedicle was then rotated into position and sutured.  Once the tube was sutured into place, adequate blood supply was confirmed with blanching and refill.  The pedicle was then wrapped with xeroform gauze and dressed appropriately with a telfa and gauze bandage to ensure continued blood supply and protect the attached pedicle.

## 2019-03-19 ENCOUNTER — APPOINTMENT (OUTPATIENT)
Dept: ORTHOPEDIC SURGERY | Facility: CLINIC | Age: 69
End: 2019-03-19
Payer: MEDICARE

## 2019-03-19 PROCEDURE — 20611 DRAIN/INJ JOINT/BURSA W/US: CPT | Mod: RT

## 2019-03-19 PROCEDURE — 99213 OFFICE O/P EST LOW 20 MIN: CPT | Mod: 25

## 2019-03-19 RX ORDER — CLINDAMYCIN HYDROCHLORIDE 300 MG/1
300 CAPSULE ORAL EVERY 6 HOURS
Qty: 28 | Refills: 0 | Status: DISCONTINUED | COMMUNITY
Start: 2018-07-06 | End: 2019-03-19

## 2019-03-19 RX ORDER — MELOXICAM 15 MG/1
15 TABLET ORAL
Qty: 30 | Refills: 1 | Status: DISCONTINUED | COMMUNITY
Start: 2018-10-30 | End: 2019-03-19

## 2019-03-19 RX ORDER — MELOXICAM 15 MG/1
15 TABLET ORAL DAILY
Qty: 30 | Refills: 0 | Status: DISCONTINUED | COMMUNITY
Start: 2018-12-11 | End: 2019-03-19

## 2019-03-19 RX ORDER — MELOXICAM 15 MG/1
15 TABLET ORAL DAILY
Qty: 30 | Refills: 0 | Status: DISCONTINUED | COMMUNITY
Start: 2018-09-27 | End: 2019-03-19

## 2019-03-19 RX ORDER — MELOXICAM 15 MG/1
15 TABLET ORAL
Qty: 15 | Refills: 0 | Status: DISCONTINUED | COMMUNITY
Start: 2018-10-25 | End: 2019-03-19

## 2019-03-19 RX ORDER — VALACYCLOVIR 500 MG/1
500 TABLET, FILM COATED ORAL
Qty: 14 | Refills: 2 | Status: DISCONTINUED | COMMUNITY
Start: 2018-07-06 | End: 2019-03-19

## 2019-04-29 ENCOUNTER — EMERGENCY (EMERGENCY)
Facility: HOSPITAL | Age: 69
LOS: 1 days | Discharge: HOME | End: 2019-04-29
Attending: EMERGENCY MEDICINE | Admitting: EMERGENCY MEDICINE
Payer: MEDICARE

## 2019-04-29 VITALS
HEART RATE: 104 BPM | TEMPERATURE: 99 F | RESPIRATION RATE: 16 BRPM | HEIGHT: 66 IN | SYSTOLIC BLOOD PRESSURE: 134 MMHG | WEIGHT: 218.48 LBS | DIASTOLIC BLOOD PRESSURE: 88 MMHG | OXYGEN SATURATION: 96 %

## 2019-04-29 VITALS
RESPIRATION RATE: 17 BRPM | TEMPERATURE: 98 F | DIASTOLIC BLOOD PRESSURE: 84 MMHG | SYSTOLIC BLOOD PRESSURE: 132 MMHG | OXYGEN SATURATION: 97 % | HEART RATE: 97 BPM

## 2019-04-29 DIAGNOSIS — Z79.891 LONG TERM (CURRENT) USE OF OPIATE ANALGESIC: ICD-10-CM

## 2019-04-29 DIAGNOSIS — Z98.890 OTHER SPECIFIED POSTPROCEDURAL STATES: Chronic | ICD-10-CM

## 2019-04-29 DIAGNOSIS — Z79.899 OTHER LONG TERM (CURRENT) DRUG THERAPY: ICD-10-CM

## 2019-04-29 DIAGNOSIS — H92.11 OTORRHEA, RIGHT EAR: ICD-10-CM

## 2019-04-29 DIAGNOSIS — Z79.82 LONG TERM (CURRENT) USE OF ASPIRIN: ICD-10-CM

## 2019-04-29 DIAGNOSIS — Z90.710 ACQUIRED ABSENCE OF BOTH CERVIX AND UTERUS: Chronic | ICD-10-CM

## 2019-04-29 DIAGNOSIS — H66.91 OTITIS MEDIA, UNSPECIFIED, RIGHT EAR: ICD-10-CM

## 2019-04-29 DIAGNOSIS — Z88.2 ALLERGY STATUS TO SULFONAMIDES: ICD-10-CM

## 2019-04-29 DIAGNOSIS — E03.9 HYPOTHYROIDISM, UNSPECIFIED: ICD-10-CM

## 2019-04-29 DIAGNOSIS — Z88.0 ALLERGY STATUS TO PENICILLIN: ICD-10-CM

## 2019-04-29 LAB
ALBUMIN SERPL ELPH-MCNC: 4.4 G/DL — SIGNIFICANT CHANGE UP (ref 3.3–5)
ALP SERPL-CCNC: 60 U/L — SIGNIFICANT CHANGE UP (ref 40–120)
ALT FLD-CCNC: 20 U/L — SIGNIFICANT CHANGE UP (ref 10–45)
ANION GAP SERPL CALC-SCNC: 11 MMOL/L — SIGNIFICANT CHANGE UP (ref 5–17)
APTT BLD: 32.7 SEC — SIGNIFICANT CHANGE UP (ref 27.5–36.3)
AST SERPL-CCNC: 17 U/L — SIGNIFICANT CHANGE UP (ref 10–40)
BASOPHILS # BLD AUTO: 0.03 K/UL — SIGNIFICANT CHANGE UP (ref 0–0.2)
BASOPHILS NFR BLD AUTO: 0.6 % — SIGNIFICANT CHANGE UP (ref 0–2)
BILIRUB SERPL-MCNC: 0.5 MG/DL — SIGNIFICANT CHANGE UP (ref 0.2–1.2)
BUN SERPL-MCNC: 12 MG/DL — SIGNIFICANT CHANGE UP (ref 7–23)
CALCIUM SERPL-MCNC: 9.6 MG/DL — SIGNIFICANT CHANGE UP (ref 8.4–10.5)
CHLORIDE SERPL-SCNC: 106 MMOL/L — SIGNIFICANT CHANGE UP (ref 96–108)
CO2 SERPL-SCNC: 25 MMOL/L — SIGNIFICANT CHANGE UP (ref 22–31)
CREAT SERPL-MCNC: 0.63 MG/DL — SIGNIFICANT CHANGE UP (ref 0.5–1.3)
EOSINOPHIL # BLD AUTO: 0.05 K/UL — SIGNIFICANT CHANGE UP (ref 0–0.5)
EOSINOPHIL NFR BLD AUTO: 0.9 % — SIGNIFICANT CHANGE UP (ref 0–6)
GLUCOSE SERPL-MCNC: 105 MG/DL — HIGH (ref 70–99)
HCT VFR BLD CALC: 45.7 % — HIGH (ref 34.5–45)
HGB BLD-MCNC: 14.6 G/DL — SIGNIFICANT CHANGE UP (ref 11.5–15.5)
IMM GRANULOCYTES NFR BLD AUTO: 0.2 % — SIGNIFICANT CHANGE UP (ref 0–1.5)
INR BLD: 1 — SIGNIFICANT CHANGE UP (ref 0.88–1.16)
LYMPHOCYTES # BLD AUTO: 2 K/UL — SIGNIFICANT CHANGE UP (ref 1–3.3)
LYMPHOCYTES # BLD AUTO: 37.8 % — SIGNIFICANT CHANGE UP (ref 13–44)
MCHC RBC-ENTMCNC: 27.5 PG — SIGNIFICANT CHANGE UP (ref 27–34)
MCHC RBC-ENTMCNC: 31.9 GM/DL — LOW (ref 32–36)
MCV RBC AUTO: 86.2 FL — SIGNIFICANT CHANGE UP (ref 80–100)
MONOCYTES # BLD AUTO: 0.35 K/UL — SIGNIFICANT CHANGE UP (ref 0–0.9)
MONOCYTES NFR BLD AUTO: 6.6 % — SIGNIFICANT CHANGE UP (ref 2–14)
NEUTROPHILS # BLD AUTO: 2.85 K/UL — SIGNIFICANT CHANGE UP (ref 1.8–7.4)
NEUTROPHILS NFR BLD AUTO: 53.9 % — SIGNIFICANT CHANGE UP (ref 43–77)
NRBC # BLD: 0 /100 WBCS — SIGNIFICANT CHANGE UP (ref 0–0)
PLATELET # BLD AUTO: 185 K/UL — SIGNIFICANT CHANGE UP (ref 150–400)
POTASSIUM SERPL-MCNC: 4.6 MMOL/L — SIGNIFICANT CHANGE UP (ref 3.5–5.3)
POTASSIUM SERPL-SCNC: 4.6 MMOL/L — SIGNIFICANT CHANGE UP (ref 3.5–5.3)
PROT SERPL-MCNC: 7.7 G/DL — SIGNIFICANT CHANGE UP (ref 6–8.3)
PROTHROM AB SERPL-ACNC: 11.3 SEC — SIGNIFICANT CHANGE UP (ref 10–12.9)
RBC # BLD: 5.3 M/UL — HIGH (ref 3.8–5.2)
RBC # FLD: 13.2 % — SIGNIFICANT CHANGE UP (ref 10.3–14.5)
SODIUM SERPL-SCNC: 142 MMOL/L — SIGNIFICANT CHANGE UP (ref 135–145)
WBC # BLD: 5.29 K/UL — SIGNIFICANT CHANGE UP (ref 3.8–10.5)
WBC # FLD AUTO: 5.29 K/UL — SIGNIFICANT CHANGE UP (ref 3.8–10.5)

## 2019-04-29 PROCEDURE — 80053 COMPREHEN METABOLIC PANEL: CPT

## 2019-04-29 PROCEDURE — 36415 COLL VENOUS BLD VENIPUNCTURE: CPT

## 2019-04-29 PROCEDURE — 85610 PROTHROMBIN TIME: CPT

## 2019-04-29 PROCEDURE — 99284 EMERGENCY DEPT VISIT MOD MDM: CPT

## 2019-04-29 PROCEDURE — 99283 EMERGENCY DEPT VISIT LOW MDM: CPT

## 2019-04-29 PROCEDURE — 86901 BLOOD TYPING SEROLOGIC RH(D): CPT

## 2019-04-29 PROCEDURE — 85730 THROMBOPLASTIN TIME PARTIAL: CPT

## 2019-04-29 PROCEDURE — 86900 BLOOD TYPING SEROLOGIC ABO: CPT

## 2019-04-29 PROCEDURE — 85025 COMPLETE CBC W/AUTO DIFF WBC: CPT

## 2019-04-29 PROCEDURE — 86850 RBC ANTIBODY SCREEN: CPT

## 2019-04-29 RX ORDER — CIPROFLOXACIN LACTATE 400MG/40ML
1 VIAL (ML) INTRAVENOUS
Qty: 10 | Refills: 0
Start: 2019-04-29 | End: 2019-05-08

## 2019-04-29 NOTE — ED PROVIDER NOTE - CARE PROVIDER_API CALL
Deepak Rey)  Otolaryngology  [address], [address] [place] [address]  Phone: [phone]  Fax: [phone]  Follow Up Time:

## 2019-04-29 NOTE — ED ADULT NURSE NOTE - OBJECTIVE STATEMENT
c/o clear yellow discharge to right ear that started on Friday accompanied with decreased hearing and fullness to site. Reported going to Urgent Care on Saturday and started Doxycycline. Pt felt nauseous and vomited her antibiotic this morning which prompted her to go back to the Urgent Care and was referred to go ot the ED with PHX CSF leak on the same ear (Oct. 2017). Denies fever, chills, headache, or any other complaints.

## 2019-04-29 NOTE — ED PROVIDER NOTE - CHIEF COMPLAINT
The patient is a 68y Female pmhx right ear CSF leak with repair 1.5 years ago, complaining of ear discharge.

## 2019-04-29 NOTE — ED ADULT NURSE NOTE - CHIEF COMPLAINT
The patient is a 68y Female complaining of ear discharge. wearing a boot on right foot/ bruising right lower leg

## 2019-04-29 NOTE — ED PROVIDER NOTE - CLINICAL SUMMARY MEDICAL DECISION MAKING FREE TEXT BOX
This is a pleasant 68 year old female pmhx right ear csf leak with repair last year presenting to the ed with right ear discharge and ear congestion. states that she has been waking up in the morning with clear discharge on her pillow case. physical exam, patient appears well, non-toxic. no evidence of infection of the right ear, notable erythema to the periphery of the TM. ENT consulted. This is a pleasant 68 year old female pmhx right ear csf leak with repair last year presenting to the ed with right ear discharge and ear congestion. states that she has been waking up in the morning with clear discharge on her pillow case, see and evaluated in urgent care and diagnosed with ear infection and placed on doxycycline, which was unable to be tolerated by patient. physical exam, patient appears well, non-toxic. no evidence of infection of the right ear, notable erythema to the periphery of the TM. ENT consulted who came to see and evaluate patient in ED. This is a pleasant 68 year old female pmhx right ear csf leak with repair last year presenting to the ed with right ear discharge and ear congestion. states that she has been waking up in the morning with clear discharge on her pillow case, see and evaluated in urgent care and diagnosed with ear infection and placed on doxycycline, which was unable to be tolerated by patient. physical exam, patient appears well, non-toxic. no evidence of infection of the right ear, notable erythema to the periphery of the TM. ENT consulted who came to see and evaluate patient in ED who recommends d/c levaquin 500mg once a day for 10 days for otitis media and to follow up next week with Dr. Rey. ED evaluation and management discussed with the patient and family (if available) in detail.  Close PMD follow up encouraged.  Strict ED return instructions discussed in detail and patient given the opportunity to ask any questions about their discharge diagnosis and instructions. Patient verbalized understanding. Patient is agreeable to plan.

## 2019-04-29 NOTE — ED PROVIDER NOTE - OBJECTIVE STATEMENT
states that she recently began to have right ear pain which she attributed to her allergies. states that she has been taking allergra without relief. states that she woke up the past few mornings with "clear liquid and yellow tinge" on the pillow case. states that she was seen and evaluated at urgent care who placed her on doxycycline for an ear infection. states that she was not able to tolerate the medication and made her vomit. states that she called her PMD and an ENT physician whom she has seen in the past who recommended she come to the er. no fever chills. chest pain palpitations cough sob, headache dizziness lightheadedness ear pain. states that she feels as if her right ear is "muffled".

## 2019-04-29 NOTE — ED PROVIDER NOTE - PHYSICAL EXAMINATION
General: Patient is well developed and well nourised. Patient is alert and oriented to person, place and date. Patient is laying comfortably in stretcher and appears in no acute distress.  HEENT: erythematous right tm without evidence of infection. Head is normocephalic and atraumatic. Pupils are equal, round and reactive. Extraocular movements intact. No evidence of nystagmus, conjunctival injection, or scleral icterus. External ears symmetric without evidence of discharge.  Nose is symmetric, non-tender, patent without evidence of discharge. Teeth in good repair. Uvula midline.   Neck: Supple with no evidence of lymphadenopathy.  Full range of motion.  Heart: Regular rate and rhythm. No murmurs, rubs or gallops.   Neuro: Cranial nerves II-XII intact. GCS 15. Moving all extremities without discomfort. Strength is 5/5 arms and legs bilaterally. Sensation intact in all four extremities. gait steady   Skin: Warm, dry and intact without evidence of rashes, bruising, pallor, jaundice or cyanosis.   Psych: Mood and affect appropriate.

## 2019-04-29 NOTE — ED ADULT TRIAGE NOTE - CHIEF COMPLAINT QUOTE
Pt directed to ED for abx management of Right Ear Infx.  Pt states "I had discharge from the Right Ear and was placed on Doxy, but it makes me nauseated and I vomited it up.  My primary team sent me here because I have a Hx of CSF leak repair in the same ear."  PT denies Fevers, CP, Dizziness, SOB.

## 2019-05-06 ENCOUNTER — APPOINTMENT (OUTPATIENT)
Dept: OTOLARYNGOLOGY | Facility: CLINIC | Age: 69
End: 2019-05-06
Payer: MEDICARE

## 2019-05-06 PROCEDURE — 99212 OFFICE O/P EST SF 10 MIN: CPT

## 2019-05-08 ENCOUNTER — FORM ENCOUNTER (OUTPATIENT)
Age: 69
End: 2019-05-08

## 2019-05-09 ENCOUNTER — APPOINTMENT (OUTPATIENT)
Dept: CT IMAGING | Facility: HOSPITAL | Age: 69
End: 2019-05-09

## 2019-05-09 ENCOUNTER — OUTPATIENT (OUTPATIENT)
Dept: OUTPATIENT SERVICES | Facility: HOSPITAL | Age: 69
LOS: 1 days | End: 2019-05-09

## 2019-05-09 ENCOUNTER — APPOINTMENT (OUTPATIENT)
Dept: CT IMAGING | Facility: CLINIC | Age: 69
End: 2019-05-09
Payer: MEDICARE

## 2019-05-09 DIAGNOSIS — Z90.710 ACQUIRED ABSENCE OF BOTH CERVIX AND UTERUS: Chronic | ICD-10-CM

## 2019-05-09 DIAGNOSIS — Z98.890 OTHER SPECIFIED POSTPROCEDURAL STATES: Chronic | ICD-10-CM

## 2019-05-09 LAB — CREAT SERPL-MCNC: 0.83 MG/DL — SIGNIFICANT CHANGE UP (ref 0.5–1.3)

## 2019-05-09 PROCEDURE — 70487 CT MAXILLOFACIAL W/DYE: CPT | Mod: 26

## 2019-05-09 PROCEDURE — 70481 CT ORBIT/EAR/FOSSA W/DYE: CPT | Mod: 26

## 2019-05-10 ENCOUNTER — APPOINTMENT (OUTPATIENT)
Dept: OTOLARYNGOLOGY | Facility: CLINIC | Age: 69
End: 2019-05-10
Payer: MEDICARE

## 2019-05-10 PROCEDURE — 92550 TYMPANOMETRY & REFLEX THRESH: CPT

## 2019-05-10 PROCEDURE — 92557 COMPREHENSIVE HEARING TEST: CPT

## 2019-05-10 NOTE — HISTORY OF PRESENT ILLNESS
[de-identified] : 66 y/o female with tegmen tympani defect and chronic rhinosinusitis and excessive nose blowing, developed pneumocephalus and meningitis in July 2017 s/p antibiotic treatment and subsequent tegmen repair via temporal craniotomy and mastoidectomy, mesh with fat graft reconstruction.  [FreeTextEntry1] : went to ED last week for R side otorrhoea and started on abx (clinda). here for f/u [Neck Mass] : no neck mass

## 2019-05-10 NOTE — PHYSICAL EXAM
[de-identified] : right postauricular incision well healed [de-identified] : right myringotomy has healed, no effusion, left TM clear [de-identified] : boggy turbinates [de-identified] : thin [Normal] : cranial nerves 2-12 intact

## 2019-05-10 NOTE — ASSESSMENT
[FreeTextEntry1] : 66 y/o female s/p right craniotomy tegment tympani repair and cranioplasty with mastoidectomy, with persistent right sided CHL\par \par - feels pressure on R side and 'fullness'\par - currently on D5 clinda\par - no fever\par - feels fine other than the feeling of 'cotton in ear'\par \par PLAN:\par - continue clindamycin until monday\par - CT w/wo contrast temporal bone\par - audiology assessment\par - RTC  after above

## 2019-05-13 ENCOUNTER — APPOINTMENT (OUTPATIENT)
Dept: OTOLARYNGOLOGY | Facility: CLINIC | Age: 69
End: 2019-05-13
Payer: MEDICARE

## 2019-05-13 VITALS
HEART RATE: 100 BPM | OXYGEN SATURATION: 97 % | WEIGHT: 212 LBS | BODY MASS INDEX: 34.07 KG/M2 | DIASTOLIC BLOOD PRESSURE: 77 MMHG | SYSTOLIC BLOOD PRESSURE: 114 MMHG | HEIGHT: 66 IN

## 2019-05-13 DIAGNOSIS — H60.509 UNSPECIFIED ACUTE NONINFECTIVE OTITIS EXTERNA, UNSPECIFIED EAR: ICD-10-CM

## 2019-05-13 DIAGNOSIS — J01.10 ACUTE FRONTAL SINUSITIS, UNSPECIFIED: ICD-10-CM

## 2019-05-13 PROCEDURE — 99212 OFFICE O/P EST SF 10 MIN: CPT

## 2019-05-13 NOTE — PHYSICAL EXAM
[de-identified] : right postauricular incision well healed [de-identified] : right myringotomy has healed, no effusion, left TM clear [de-identified] : boggy turbinates [de-identified] : thin [Normal] : orientation to person, place, and time: normal

## 2019-05-13 NOTE — ASSESSMENT
[FreeTextEntry1] : 68 y/o female s/p right craniotomy tegment tympani repair and cranioplasty with mastoidectomy, with persistent right sided CHL\par \par patient doing well\par \par PLAN:\par - patient wishes to see otologist - given contact information for Dr Carson Estes\par - patient instructed to finish course of antibiotics\par - F/u with us as needed

## 2019-05-13 NOTE — HISTORY OF PRESENT ILLNESS
[de-identified] : 68 y/o female with tegmen tympani defect and chronic rhinosinusitis and excessive nose blowing, developed pneumocephalus and meningitis in July 2017 s/p antibiotic treatment and subsequent tegmen repair via temporal craniotomy and mastoidectomy, mesh with fat graft reconstruction.  [FreeTextEntry1] : - doing well w no complaints. no pain, no fluid from ear, no fever\par - Day 11 of abx. Patient advised to complete course of abx as prescribed\par - audiogram explained. L side Tymp type A; R side Tymp type C, with mild conductive HL as represented by A-B gap.  [Neck Mass] : no neck mass

## 2019-08-13 ENCOUNTER — APPOINTMENT (OUTPATIENT)
Dept: ORTHOPEDIC SURGERY | Facility: CLINIC | Age: 69
End: 2019-08-13
Payer: MEDICARE

## 2019-08-13 VITALS — HEIGHT: 66 IN | RESPIRATION RATE: 16 BRPM | BODY MASS INDEX: 34.07 KG/M2 | WEIGHT: 212 LBS

## 2019-08-13 PROCEDURE — 20611 DRAIN/INJ JOINT/BURSA W/US: CPT | Mod: RT

## 2019-08-13 RX ORDER — DILTIAZEM HYDROCHLORIDE 30 MG/1
30 TABLET ORAL
Refills: 0 | Status: DISCONTINUED | COMMUNITY
Start: 2018-06-12 | End: 2019-08-13

## 2019-08-13 RX ORDER — CLINDAMYCIN HYDROCHLORIDE 300 MG/1
300 CAPSULE ORAL
Qty: 21 | Refills: 0 | Status: DISCONTINUED | COMMUNITY
Start: 2019-05-07 | End: 2019-08-13

## 2019-08-13 RX ORDER — ONDANSETRON 4 MG/1
4 TABLET ORAL EVERY 6 HOURS
Qty: 10 | Refills: 2 | Status: DISCONTINUED | COMMUNITY
Start: 2019-05-07 | End: 2019-08-13

## 2019-08-13 RX ORDER — CLINDAMYCIN HYDROCHLORIDE 300 MG/1
300 CAPSULE ORAL
Qty: 21 | Refills: 0 | Status: DISCONTINUED | COMMUNITY
Start: 2019-05-01 | End: 2019-08-13

## 2019-11-10 NOTE — PRE-OP CHECKLIST - LOOSE TEETH
Home.  Rest,  Monitor symptoms.  Encourage eating and fluids.  Return if any concerns.  If recurrent symptoms lasting more than 10-15 minutes, return to the ER.  Follow up with MD for recheck.    Results for orders placed or performed during the hospital encounter of 11/10/19   Creatinine POCT     Status: None   Result Value Ref Range    Creatinine 1.1 0.66 - 1.25 mg/dL    GFR Estimate 62 >60 mL/min/[1.73_m2]    GFR Estimate If Black 75 >60 mL/min/[1.73_m2]   CBC with platelets differential     Status: Abnormal   Result Value Ref Range    WBC 10.9 4.0 - 11.0 10e9/L    RBC Count 4.04 (L) 4.4 - 5.9 10e12/L    Hemoglobin 13.2 (L) 13.3 - 17.7 g/dL    Hematocrit 41.4 40.0 - 53.0 %     (H) 78 - 100 fl    MCH 32.7 26.5 - 33.0 pg    MCHC 31.9 31.5 - 36.5 g/dL    RDW 12.7 10.0 - 15.0 %    Platelet Count 198 150 - 450 10e9/L    Diff Method Automated Method     % Neutrophils 69.3 %    % Lymphocytes 19.7 %    % Monocytes 7.6 %    % Eosinophils 2.2 %    % Basophils 0.6 %    % Immature Granulocytes 0.6 %    Nucleated RBCs 0 0 /100    Absolute Neutrophil 7.5 1.6 - 8.3 10e9/L    Absolute Lymphocytes 2.1 0.8 - 5.3 10e9/L    Absolute Monocytes 0.8 0.0 - 1.3 10e9/L    Absolute Eosinophils 0.2 0.0 - 0.7 10e9/L    Absolute Basophils 0.1 0.0 - 0.2 10e9/L    Abs Immature Granulocytes 0.1 0 - 0.4 10e9/L    Absolute Nucleated RBC 0.0    Basic metabolic panel     Status: Abnormal   Result Value Ref Range    Sodium 140 133 - 144 mmol/L    Potassium 4.2 3.4 - 5.3 mmol/L    Chloride 107 94 - 109 mmol/L    Carbon Dioxide 28 20 - 32 mmol/L    Anion Gap 4 3 - 14 mmol/L    Glucose 194 (H) 70 - 99 mg/dL    Urea Nitrogen 29 7 - 30 mg/dL    Creatinine 1.01 0.66 - 1.25 mg/dL    GFR Estimate 61 >60 mL/min/[1.73_m2]    GFR Estimate If Black 71 >60 mL/min/[1.73_m2]    Calcium 9.5 8.5 - 10.1 mg/dL   Partial thromboplastin time     Status: None   Result Value Ref Range    PTT 30 22 - 37 sec   Glucose by meter     Status: Abnormal   Result Value Ref  Range    Glucose 199 (H) 70 - 99 mg/dL   UA reflex to Microscopic and Culture     Status: None   Result Value Ref Range    Color Urine Yellow     Appearance Urine Clear     Glucose Urine Negative NEG^Negative mg/dL    Bilirubin Urine Negative NEG^Negative    Ketones Urine Negative NEG^Negative mg/dL    Specific Gravity Urine 1.025 1.003 - 1.035    Blood Urine Negative NEG^Negative    pH Urine 5.0 5.0 - 7.0 pH    Protein Albumin Urine Negative NEG^Negative mg/dL    Urobilinogen mg/dL Normal 0.0 - 2.0 mg/dL    Nitrite Urine Negative NEG^Negative    Leukocyte Esterase Urine Negative NEG^Negative    Source Clean catch urine    Hepatic panel     Status: None   Result Value Ref Range    Bilirubin Direct 0.2 0.0 - 0.2 mg/dL    Bilirubin Total 0.6 0.2 - 1.3 mg/dL    Albumin 3.7 3.4 - 5.0 g/dL    Protein Total 7.3 6.8 - 8.8 g/dL    Alkaline Phosphatase 115 40 - 150 U/L    ALT 42 0 - 70 U/L    AST 43 0 - 45 U/L   EKG 12-lead, tracing only     Status: None (Preliminary result)   Result Value Ref Range    Interpretation ECG Click View Image link to view waveform and result    ISTAT INR POCT     Status: None   Result Value Ref Range    ISTAT INR <0.9 0.86 - 1.14   Troponin POCT     Status: None   Result Value Ref Range    Troponin I 0.04 0.00 - 0.08 ug/L     MR Brain for Stroke Limited   Final Result   Impression:   1. No acute infarct.   2. Moderate chronic small vessel ischemic disease and and   age-appropriate parenchymal volume loss.      I have personally reviewed the examination and initial interpretation   and I agree with the findings.      LIDA YANEZ MD      XR Chest Port 1 View   Final Result   IMPRESSION: Pulmonary vascular congestion. No focal airspace   opacities.      I have personally reviewed the examination and initial interpretation   and I agree with the findings.      DOUG RAMIREZ MD      CT Head w/o Contrast   Final Result   Impression:    1. No acute intracranial pathology.    2. Moderate chronic  small vessel ischemic disease.      I have personally reviewed the examination and initial interpretation   and I agree with the findings.      LIDA YANEZ MD             no

## 2020-02-05 ENCOUNTER — APPOINTMENT (OUTPATIENT)
Dept: MAMMOGRAPHY | Facility: CLINIC | Age: 70
End: 2020-02-05
Payer: MEDICARE

## 2020-02-05 ENCOUNTER — APPOINTMENT (OUTPATIENT)
Dept: ULTRASOUND IMAGING | Facility: CLINIC | Age: 70
End: 2020-02-05
Payer: MEDICARE

## 2020-02-05 ENCOUNTER — OUTPATIENT (OUTPATIENT)
Dept: OUTPATIENT SERVICES | Facility: HOSPITAL | Age: 70
LOS: 1 days | End: 2020-02-05

## 2020-02-05 DIAGNOSIS — Z90.710 ACQUIRED ABSENCE OF BOTH CERVIX AND UTERUS: Chronic | ICD-10-CM

## 2020-02-05 DIAGNOSIS — Z98.890 OTHER SPECIFIED POSTPROCEDURAL STATES: Chronic | ICD-10-CM

## 2020-02-05 PROCEDURE — 77063 BREAST TOMOSYNTHESIS BI: CPT | Mod: 26

## 2020-02-05 PROCEDURE — 77067 SCR MAMMO BI INCL CAD: CPT | Mod: 26

## 2020-02-05 PROCEDURE — 76641 ULTRASOUND BREAST COMPLETE: CPT | Mod: 26,50

## 2020-02-11 NOTE — PROGRESS NOTE ADULT - PROBLEM SELECTOR PLAN 1
the patient has no bronchospasm. Continue on bronchodilator The baseline oxygen saturation. Is normal.
Calm
the patient has no bronchospasm. Continue on bronchodilator The baseline oxygen saturation. Is normal.
the patient has no bronchospasm. Continue on bronchodilator The baseline oxygen saturation. He is normal.

## 2020-03-23 ENCOUNTER — APPOINTMENT (OUTPATIENT)
Dept: CT IMAGING | Facility: CLINIC | Age: 70
End: 2020-03-23

## 2020-03-23 ENCOUNTER — APPOINTMENT (OUTPATIENT)
Dept: CT IMAGING | Facility: HOSPITAL | Age: 70
End: 2020-03-23

## 2020-03-23 ENCOUNTER — OUTPATIENT (OUTPATIENT)
Dept: OUTPATIENT SERVICES | Facility: HOSPITAL | Age: 70
LOS: 1 days | End: 2020-03-23

## 2020-03-23 DIAGNOSIS — Z98.890 OTHER SPECIFIED POSTPROCEDURAL STATES: Chronic | ICD-10-CM

## 2020-03-23 DIAGNOSIS — Z90.710 ACQUIRED ABSENCE OF BOTH CERVIX AND UTERUS: Chronic | ICD-10-CM

## 2020-05-21 NOTE — PROGRESS NOTE ADULT - ENMT
Call to home, message left on mom's unidentified voicemail for call back.   No oral lesions; no gross abnormalities negative

## 2020-06-09 ENCOUNTER — APPOINTMENT (OUTPATIENT)
Dept: ORTHOPEDIC SURGERY | Facility: CLINIC | Age: 70
End: 2020-06-09
Payer: MEDICARE

## 2020-06-09 VITALS — BODY MASS INDEX: 34.55 KG/M2 | WEIGHT: 215 LBS | HEIGHT: 66 IN

## 2020-06-09 PROCEDURE — 99213 OFFICE O/P EST LOW 20 MIN: CPT | Mod: 25

## 2020-06-09 PROCEDURE — 20610 DRAIN/INJ JOINT/BURSA W/O US: CPT | Mod: RT

## 2020-06-11 ENCOUNTER — RESULT REVIEW (OUTPATIENT)
Age: 70
End: 2020-06-11

## 2020-09-03 ENCOUNTER — APPOINTMENT (OUTPATIENT)
Dept: ORTHOPEDIC SURGERY | Facility: CLINIC | Age: 70
End: 2020-09-03
Payer: MEDICARE

## 2020-09-03 DIAGNOSIS — M70.61 TROCHANTERIC BURSITIS, RIGHT HIP: ICD-10-CM

## 2020-09-03 PROCEDURE — 73564 X-RAY EXAM KNEE 4 OR MORE: CPT | Mod: RT

## 2020-09-03 PROCEDURE — 99214 OFFICE O/P EST MOD 30 MIN: CPT

## 2020-09-14 ENCOUNTER — APPOINTMENT (OUTPATIENT)
Dept: RADIOLOGY | Facility: CLINIC | Age: 70
End: 2020-09-14

## 2020-09-14 ENCOUNTER — APPOINTMENT (OUTPATIENT)
Dept: ORTHOPEDIC SURGERY | Facility: CLINIC | Age: 70
End: 2020-09-14

## 2020-09-14 ENCOUNTER — OUTPATIENT (OUTPATIENT)
Dept: OUTPATIENT SERVICES | Facility: HOSPITAL | Age: 70
LOS: 1 days | End: 2020-09-14
Payer: MEDICARE

## 2020-09-14 ENCOUNTER — RESULT REVIEW (OUTPATIENT)
Age: 70
End: 2020-09-14

## 2020-09-14 ENCOUNTER — APPOINTMENT (OUTPATIENT)
Dept: ORTHOPEDIC SURGERY | Facility: CLINIC | Age: 70
End: 2020-09-14
Payer: MEDICARE

## 2020-09-14 VITALS — WEIGHT: 220 LBS | HEIGHT: 66 IN | BODY MASS INDEX: 35.36 KG/M2 | RESPIRATION RATE: 16 BRPM

## 2020-09-14 DIAGNOSIS — Z98.890 OTHER SPECIFIED POSTPROCEDURAL STATES: Chronic | ICD-10-CM

## 2020-09-14 DIAGNOSIS — Z90.710 ACQUIRED ABSENCE OF BOTH CERVIX AND UTERUS: Chronic | ICD-10-CM

## 2020-09-14 DIAGNOSIS — M70.41 PREPATELLAR BURSITIS, RIGHT KNEE: ICD-10-CM

## 2020-09-14 PROCEDURE — 99214 OFFICE O/P EST MOD 30 MIN: CPT

## 2020-09-14 PROCEDURE — 73610 X-RAY EXAM OF ANKLE: CPT | Mod: 26,RT

## 2020-09-14 PROCEDURE — 73630 X-RAY EXAM OF FOOT: CPT | Mod: 26,RT

## 2020-10-12 ENCOUNTER — APPOINTMENT (OUTPATIENT)
Dept: ORTHOPEDIC SURGERY | Facility: CLINIC | Age: 70
End: 2020-10-12
Payer: MEDICARE

## 2020-10-12 PROCEDURE — 99213 OFFICE O/P EST LOW 20 MIN: CPT

## 2020-10-15 ENCOUNTER — APPOINTMENT (OUTPATIENT)
Dept: ORTHOPEDIC SURGERY | Facility: CLINIC | Age: 70
End: 2020-10-15

## 2020-10-26 ENCOUNTER — APPOINTMENT (OUTPATIENT)
Dept: ORTHOPEDIC SURGERY | Facility: CLINIC | Age: 70
End: 2020-10-26
Payer: MEDICARE

## 2020-10-26 PROCEDURE — 20610 DRAIN/INJ JOINT/BURSA W/O US: CPT | Mod: RT

## 2020-10-26 RX ORDER — LEVOTHYROXINE SODIUM 137 UG/1
137 TABLET ORAL
Qty: 90 | Refills: 0 | Status: ACTIVE | COMMUNITY
Start: 2020-08-14

## 2020-10-26 NOTE — PROCEDURE
[de-identified] : Procedure: Euflexxa injection of the RIGHT Knee joint \par Indication:  Inflammation and Joint pain. \par Risk, benefits and alternatives were discussed with the patient. Potential complications include bleeding, infection and allergic reaction. Verbal consent was obtained prior to the procedure. \par Alcohol was used to prep the area.  Ethyl chloride spray was used as a topical anesthetic. Using sterile technique, the aspiration/injection needle was then directed from a lateral and superior aspect. The procedure was ultrasound guided.  A 21 G 1.5 inch needle was used to inject 2 mL of Euflexxa lot# \par [id number]. \par A bandage was applied.  The patient tolerated the procedure well. \par Complications: None. \par Patient instructed to avoid strenuous activity for 2 day(s). \par Follow-up for repeat injection in 1-2 weeks, or following 3rd injection in 4-6 months; if pain is unresolved; or for further concerns.   Specifically discussed signs and symptoms of aseptic synovitis as well as septic arthritis, instructed patient to immediately present to the clinic (if open), or to an emergency room if these occur for further evaluation.\par

## 2020-11-02 ENCOUNTER — APPOINTMENT (OUTPATIENT)
Dept: ORTHOPEDIC SURGERY | Facility: CLINIC | Age: 70
End: 2020-11-02
Payer: MEDICARE

## 2020-11-02 PROCEDURE — 20611 DRAIN/INJ JOINT/BURSA W/US: CPT | Mod: RT

## 2020-11-09 ENCOUNTER — APPOINTMENT (OUTPATIENT)
Dept: ORTHOPEDIC SURGERY | Facility: CLINIC | Age: 70
End: 2020-11-09
Payer: MEDICARE

## 2020-11-09 PROCEDURE — 20610 DRAIN/INJ JOINT/BURSA W/O US: CPT | Mod: RT

## 2020-11-18 ENCOUNTER — RESULT REVIEW (OUTPATIENT)
Age: 70
End: 2020-11-18

## 2020-11-18 ENCOUNTER — OUTPATIENT (OUTPATIENT)
Dept: OUTPATIENT SERVICES | Facility: HOSPITAL | Age: 70
LOS: 1 days | End: 2020-11-18
Payer: MEDICARE

## 2020-11-18 ENCOUNTER — APPOINTMENT (OUTPATIENT)
Dept: ORTHOPEDIC SURGERY | Facility: CLINIC | Age: 70
End: 2020-11-18
Payer: MEDICARE

## 2020-11-18 DIAGNOSIS — M77.42 METATARSALGIA, LEFT FOOT: ICD-10-CM

## 2020-11-18 DIAGNOSIS — M21.6X1 OTHER ACQUIRED DEFORMITIES OF RIGHT FOOT: ICD-10-CM

## 2020-11-18 DIAGNOSIS — Z98.890 OTHER SPECIFIED POSTPROCEDURAL STATES: Chronic | ICD-10-CM

## 2020-11-18 DIAGNOSIS — S93.401A SPRAIN OF UNSPECIFIED LIGAMENT OF RIGHT ANKLE, INITIAL ENCOUNTER: ICD-10-CM

## 2020-11-18 DIAGNOSIS — M21.6X2 OTHER ACQUIRED DEFORMITIES OF LEFT FOOT: ICD-10-CM

## 2020-11-18 DIAGNOSIS — M24.271 DISORDER OF LIGAMENT, RIGHT ANKLE: ICD-10-CM

## 2020-11-18 DIAGNOSIS — Z90.710 ACQUIRED ABSENCE OF BOTH CERVIX AND UTERUS: Chronic | ICD-10-CM

## 2020-11-18 DIAGNOSIS — M79.672 PAIN IN LEFT FOOT: ICD-10-CM

## 2020-11-18 PROCEDURE — 73630 X-RAY EXAM OF FOOT: CPT | Mod: 26,LT

## 2020-11-18 PROCEDURE — 99214 OFFICE O/P EST MOD 30 MIN: CPT

## 2020-11-18 PROCEDURE — 73630 X-RAY EXAM OF FOOT: CPT

## 2020-11-18 PROCEDURE — 73610 X-RAY EXAM OF ANKLE: CPT

## 2020-11-18 PROCEDURE — 73610 X-RAY EXAM OF ANKLE: CPT | Mod: 26,LT

## 2020-11-29 ENCOUNTER — INPATIENT (INPATIENT)
Facility: HOSPITAL | Age: 70
LOS: 1 days | Discharge: HOME | DRG: 177 | End: 2020-12-01
Attending: STUDENT IN AN ORGANIZED HEALTH CARE EDUCATION/TRAINING PROGRAM | Admitting: STUDENT IN AN ORGANIZED HEALTH CARE EDUCATION/TRAINING PROGRAM
Payer: MEDICARE

## 2020-11-29 VITALS
OXYGEN SATURATION: 91 % | SYSTOLIC BLOOD PRESSURE: 109 MMHG | WEIGHT: 220.02 LBS | DIASTOLIC BLOOD PRESSURE: 70 MMHG | RESPIRATION RATE: 20 BRPM | HEIGHT: 66 IN | HEART RATE: 101 BPM | TEMPERATURE: 100 F

## 2020-11-29 DIAGNOSIS — Z98.890 OTHER SPECIFIED POSTPROCEDURAL STATES: Chronic | ICD-10-CM

## 2020-11-29 DIAGNOSIS — Z90.710 ACQUIRED ABSENCE OF BOTH CERVIX AND UTERUS: Chronic | ICD-10-CM

## 2020-11-29 LAB
ALBUMIN SERPL ELPH-MCNC: 3.5 G/DL — SIGNIFICANT CHANGE UP (ref 3.4–5)
ALP SERPL-CCNC: 64 U/L — SIGNIFICANT CHANGE UP (ref 40–120)
ALT FLD-CCNC: 13 U/L — SIGNIFICANT CHANGE UP (ref 12–42)
ANION GAP SERPL CALC-SCNC: 13 MMOL/L — SIGNIFICANT CHANGE UP (ref 9–16)
APPEARANCE UR: CLEAR — SIGNIFICANT CHANGE UP
APTT BLD: 24.5 SEC — LOW (ref 27.5–35.5)
AST SERPL-CCNC: 26 U/L — SIGNIFICANT CHANGE UP (ref 15–37)
BACTERIA # UR AUTO: PRESENT /HPF
BASOPHILS # BLD AUTO: 0 K/UL — SIGNIFICANT CHANGE UP (ref 0–0.2)
BILIRUB SERPL-MCNC: 0.6 MG/DL — SIGNIFICANT CHANGE UP (ref 0.2–1.2)
BILIRUB UR-MCNC: ABNORMAL
BUN SERPL-MCNC: 8 MG/DL — SIGNIFICANT CHANGE UP (ref 7–23)
CALCIUM SERPL-MCNC: 9.3 MG/DL — SIGNIFICANT CHANGE UP (ref 8.5–10.5)
CHLORIDE SERPL-SCNC: 101 MMOL/L — SIGNIFICANT CHANGE UP (ref 96–108)
CO2 SERPL-SCNC: 22 MMOL/L — SIGNIFICANT CHANGE UP (ref 22–31)
COLOR SPEC: YELLOW — SIGNIFICANT CHANGE UP
CREAT SERPL-MCNC: 0.72 MG/DL — SIGNIFICANT CHANGE UP (ref 0.5–1.3)
CRP SERPL-MCNC: 5.3 MG/DL — HIGH (ref 0–0.9)
D DIMER BLD IA.RAPID-MCNC: HIGH NG/ML DDU
DIFF PNL FLD: ABNORMAL
EOSINOPHIL # BLD AUTO: 0.01 K/UL — SIGNIFICANT CHANGE UP (ref 0–0.5)
EOSINOPHIL NFR BLD AUTO: 0.3 % — SIGNIFICANT CHANGE UP (ref 0–6)
EPI CELLS # UR: SIGNIFICANT CHANGE UP /HPF (ref 0–5)
GLUCOSE SERPL-MCNC: 102 MG/DL — HIGH (ref 70–99)
GLUCOSE UR QL: NEGATIVE — SIGNIFICANT CHANGE UP
HCT VFR BLD CALC: 43.4 % — SIGNIFICANT CHANGE UP (ref 34.5–45)
HGB BLD-MCNC: 14.6 G/DL — SIGNIFICANT CHANGE UP (ref 11.5–15.5)
IMM GRANULOCYTES NFR BLD AUTO: 0.3 % — SIGNIFICANT CHANGE UP (ref 0–1.5)
INR BLD: 0.98 — SIGNIFICANT CHANGE UP (ref 0.88–1.16)
KETONES UR-MCNC: 15 MG/DL
LACTATE SERPL-SCNC: 1 MMOL/L — SIGNIFICANT CHANGE UP (ref 0.4–2)
LEUKOCYTE ESTERASE UR-ACNC: NEGATIVE — SIGNIFICANT CHANGE UP
LYMPHOCYTES # BLD AUTO: 1.07 K/UL — SIGNIFICANT CHANGE UP (ref 1–3.3)
LYMPHOCYTES # BLD AUTO: 27.4 % — SIGNIFICANT CHANGE UP (ref 13–44)
MANUAL SMEAR VERIFICATION: SIGNIFICANT CHANGE UP
MCHC RBC-ENTMCNC: 28.1 PG — SIGNIFICANT CHANGE UP (ref 27–34)
MCHC RBC-ENTMCNC: 33.6 GM/DL — SIGNIFICANT CHANGE UP (ref 32–36)
MCV RBC AUTO: 83.6 FL — SIGNIFICANT CHANGE UP (ref 80–100)
MONOCYTES # BLD AUTO: 0.18 K/UL — SIGNIFICANT CHANGE UP (ref 0–0.9)
MONOCYTES NFR BLD AUTO: 4.6 % — SIGNIFICANT CHANGE UP (ref 2–14)
NEUTROPHILS # BLD AUTO: 2.63 K/UL — SIGNIFICANT CHANGE UP (ref 1.8–7.4)
NEUTROPHILS NFR BLD AUTO: 67.4 % — SIGNIFICANT CHANGE UP (ref 43–77)
NITRITE UR-MCNC: NEGATIVE — SIGNIFICANT CHANGE UP
NRBC # BLD: 0 /100 WBCS — SIGNIFICANT CHANGE UP (ref 0–0)
NT-PROBNP SERPL-SCNC: 101 PG/ML — SIGNIFICANT CHANGE UP
PH UR: 6 — SIGNIFICANT CHANGE UP (ref 5–8)
PLAT MORPH BLD: NORMAL — SIGNIFICANT CHANGE UP
PLATELET # BLD AUTO: 113 K/UL — LOW (ref 150–400)
POTASSIUM SERPL-MCNC: 4.2 MMOL/L — SIGNIFICANT CHANGE UP (ref 3.5–5.3)
POTASSIUM SERPL-SCNC: 4.2 MMOL/L — SIGNIFICANT CHANGE UP (ref 3.5–5.3)
PROT SERPL-MCNC: 7.9 G/DL — SIGNIFICANT CHANGE UP (ref 6.4–8.2)
PROT UR-MCNC: ABNORMAL MG/DL
PROTHROM AB SERPL-ACNC: 11.8 SEC — SIGNIFICANT CHANGE UP (ref 10.6–13.6)
RBC # BLD: 5.19 M/UL — SIGNIFICANT CHANGE UP (ref 3.8–5.2)
RBC # FLD: 12.8 % — SIGNIFICANT CHANGE UP (ref 10.3–14.5)
RBC BLD AUTO: NORMAL — SIGNIFICANT CHANGE UP
RBC CASTS # UR COMP ASSIST: < 5 /HPF — SIGNIFICANT CHANGE UP
SARS-COV-2 RNA SPEC QL NAA+PROBE: DETECTED
SODIUM SERPL-SCNC: 136 MMOL/L — SIGNIFICANT CHANGE UP (ref 132–145)
SP GR SPEC: 1.02 — SIGNIFICANT CHANGE UP (ref 1–1.03)
TROPONIN I SERPL-MCNC: <0.017 NG/ML — LOW (ref 0.02–0.06)
UROBILINOGEN FLD QL: 0.2 E.U./DL — SIGNIFICANT CHANGE UP
WBC # BLD: 3.9 K/UL — SIGNIFICANT CHANGE UP (ref 3.8–10.5)
WBC # FLD AUTO: 3.9 K/UL — SIGNIFICANT CHANGE UP (ref 3.8–10.5)
WBC UR QL: < 5 /HPF — SIGNIFICANT CHANGE UP

## 2020-11-29 PROCEDURE — 93010 ELECTROCARDIOGRAM REPORT: CPT

## 2020-11-29 PROCEDURE — 99285 EMERGENCY DEPT VISIT HI MDM: CPT | Mod: CS

## 2020-11-29 PROCEDURE — 71275 CT ANGIOGRAPHY CHEST: CPT | Mod: 26

## 2020-11-29 PROCEDURE — 71250 CT THORAX DX C-: CPT | Mod: 26,59

## 2020-11-29 RX ORDER — ACETAMINOPHEN 500 MG
650 TABLET ORAL ONCE
Refills: 0 | Status: COMPLETED | OUTPATIENT
Start: 2020-11-29 | End: 2020-11-29

## 2020-11-29 RX ORDER — AZITHROMYCIN 500 MG/1
500 TABLET, FILM COATED ORAL ONCE
Refills: 0 | Status: COMPLETED | OUTPATIENT
Start: 2020-11-29 | End: 2020-11-29

## 2020-11-29 RX ORDER — SODIUM CHLORIDE 9 MG/ML
1850 INJECTION INTRAMUSCULAR; INTRAVENOUS; SUBCUTANEOUS ONCE
Refills: 0 | Status: COMPLETED | OUTPATIENT
Start: 2020-11-29 | End: 2020-11-29

## 2020-11-29 RX ORDER — CEFTRIAXONE 500 MG/1
1000 INJECTION, POWDER, FOR SOLUTION INTRAMUSCULAR; INTRAVENOUS ONCE
Refills: 0 | Status: COMPLETED | OUTPATIENT
Start: 2020-11-29 | End: 2020-11-29

## 2020-11-29 RX ORDER — DEXAMETHASONE 0.5 MG/5ML
6 ELIXIR ORAL ONCE
Refills: 0 | Status: COMPLETED | OUTPATIENT
Start: 2020-11-29 | End: 2020-11-29

## 2020-11-29 RX ADMIN — Medication 650 MILLIGRAM(S): at 20:17

## 2020-11-29 RX ADMIN — Medication 1 MILLIGRAM(S): at 22:00

## 2020-11-29 RX ADMIN — SODIUM CHLORIDE 1850 MILLILITER(S): 9 INJECTION INTRAMUSCULAR; INTRAVENOUS; SUBCUTANEOUS at 15:20

## 2020-11-29 RX ADMIN — AZITHROMYCIN 500 MILLIGRAM(S): 500 TABLET, FILM COATED ORAL at 20:26

## 2020-11-29 RX ADMIN — SODIUM CHLORIDE 1850 MILLILITER(S): 9 INJECTION INTRAMUSCULAR; INTRAVENOUS; SUBCUTANEOUS at 20:28

## 2020-11-29 RX ADMIN — Medication 650 MILLIGRAM(S): at 16:38

## 2020-11-29 RX ADMIN — CEFTRIAXONE 100 MILLIGRAM(S): 500 INJECTION, POWDER, FOR SOLUTION INTRAMUSCULAR; INTRAVENOUS at 15:25

## 2020-11-29 RX ADMIN — AZITHROMYCIN 255 MILLIGRAM(S): 500 TABLET, FILM COATED ORAL at 15:38

## 2020-11-29 RX ADMIN — CEFTRIAXONE 1000 MILLIGRAM(S): 500 INJECTION, POWDER, FOR SOLUTION INTRAMUSCULAR; INTRAVENOUS at 20:26

## 2020-11-29 RX ADMIN — Medication 6 MILLIGRAM(S): at 21:08

## 2020-11-29 NOTE — ED ADULT TRIAGE NOTE - CHIEF COMPLAINT QUOTE
reports being COVID+ on 11/21, today is her 9th day of quarantine and states yesterday, had difficulty breathing when lying down and coughs when taking a deep breath. has been taking Tylenol q4-5 hours, took 1g of Tylenol at 10AM.

## 2020-11-29 NOTE — ED ADULT NURSE NOTE - OBJECTIVE STATEMENT
pt. with +Covid test on 11/21 c/o worsening shortness of breath today and cough. Pt. speaking in full sentences in no acute distress.
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
## 2020-11-29 NOTE — ED PROVIDER NOTE - PHYSICAL EXAMINATION
CONSTITUTIONAL: Well-appearing; well-nourished; in no apparent distress.   	HEAD: Normocephalic; atraumatic.   	EYES:  conjunctiva and sclera clear  	ENT: airway patent  	NECK: Supple; non-tender;   	CARDIOVASCULAR: Normal S1, S2; no murmurs, rubs, or gallops. Regular rate and rhythm.   	RESPIRATORY: Breathing easily; breath sounds clear and equal bilaterally; no wheezes, rhonchi, or rales.  	GI: Soft; non-distended; non-tender  	EXT: No cyanosis or edema; N/V intact  	SKIN: Normal for age and race; warm; dry; good turgor; no apparent lesions or rash.   	NEURO: A & O x 3; face symmetric; grossly unremarkable.   PSYCHOLOGICAL: The patient’s mood and manner are appropriate.

## 2020-11-29 NOTE — ED PROVIDER NOTE - CLINICAL SUMMARY MEDICAL DECISION MAKING FREE TEXT BOX
covid-19 pneumonitis, febrile, 02 sat 91% on RA requiring 3L NC, +dyspnea on exertion, covered with IV antibiotics, admit to medicine for further management.

## 2020-11-29 NOTE — ED PROVIDER NOTE - OBJECTIVE STATEMENT
69 yo female with hx hypothyroidism presents c/o worsening dyspnea with fever x 9 days. states she been having to stop walking on her way to the ED as she is having trouble breathing upon walking. also c/o trouble breathing with coughing fits when laying flat. no chest pain. tested pos for covid-19 nine days ago, this is day 10 of symptoms. took 5 days of tamiflu due to presumed flu early in course. has been taking tylenol for fever.

## 2020-11-29 NOTE — ED ADULT NURSE REASSESSMENT NOTE - NS ED NURSE REASSESS COMMENT FT1
Received patient from previous shift RM 10 on droplet/contact isolation at this time for Covid 19. Patient is aox4 and in no acute pain or distress. Ambulates independently without assistive devices to the restroom. Patient for CTA-chest at this time and admission to [facility]. Safety precautions in place and maintained. Awaiting available admission bed.

## 2020-11-30 DIAGNOSIS — E88.89 OTHER SPECIFIED METABOLIC DISORDERS: ICD-10-CM

## 2020-11-30 DIAGNOSIS — J96.01 ACUTE RESPIRATORY FAILURE WITH HYPOXIA: ICD-10-CM

## 2020-11-30 DIAGNOSIS — Z29.9 ENCOUNTER FOR PROPHYLACTIC MEASURES, UNSPECIFIED: ICD-10-CM

## 2020-11-30 DIAGNOSIS — U07.1 COVID-19: ICD-10-CM

## 2020-11-30 DIAGNOSIS — E66.01 MORBID (SEVERE) OBESITY DUE TO EXCESS CALORIES: ICD-10-CM

## 2020-11-30 DIAGNOSIS — E03.9 HYPOTHYROIDISM, UNSPECIFIED: ICD-10-CM

## 2020-11-30 LAB
A1C WITH ESTIMATED AVERAGE GLUCOSE RESULT: 5.7 % — HIGH (ref 4–5.6)
ALBUMIN SERPL ELPH-MCNC: 3.6 G/DL — SIGNIFICANT CHANGE UP (ref 3.3–5)
ALP SERPL-CCNC: 53 U/L — SIGNIFICANT CHANGE UP (ref 40–120)
ALT FLD-CCNC: 14 U/L — SIGNIFICANT CHANGE UP (ref 10–45)
ANION GAP SERPL CALC-SCNC: 15 MMOL/L — SIGNIFICANT CHANGE UP (ref 5–17)
AST SERPL-CCNC: 17 U/L — SIGNIFICANT CHANGE UP (ref 10–40)
BASOPHILS # BLD AUTO: 0 K/UL — SIGNIFICANT CHANGE UP (ref 0–0.2)
BASOPHILS NFR BLD AUTO: 0 % — SIGNIFICANT CHANGE UP (ref 0–2)
BILIRUB SERPL-MCNC: 0.4 MG/DL — SIGNIFICANT CHANGE UP (ref 0.2–1.2)
BUN SERPL-MCNC: 8 MG/DL — SIGNIFICANT CHANGE UP (ref 7–23)
CALCIUM SERPL-MCNC: 8.8 MG/DL — SIGNIFICANT CHANGE UP (ref 8.4–10.5)
CHLORIDE SERPL-SCNC: 103 MMOL/L — SIGNIFICANT CHANGE UP (ref 96–108)
CHOLEST SERPL-MCNC: 104 MG/DL — SIGNIFICANT CHANGE UP
CO2 SERPL-SCNC: 22 MMOL/L — SIGNIFICANT CHANGE UP (ref 22–31)
CREAT SERPL-MCNC: 0.56 MG/DL — SIGNIFICANT CHANGE UP (ref 0.5–1.3)
CRP SERPL-MCNC: 5.39 MG/DL — HIGH (ref 0–0.4)
CULTURE RESULTS: SIGNIFICANT CHANGE UP
D DIMER BLD IA.RAPID-MCNC: 278 NG/ML DDU — HIGH
EOSINOPHIL # BLD AUTO: 0 K/UL — SIGNIFICANT CHANGE UP (ref 0–0.5)
EOSINOPHIL NFR BLD AUTO: 0 % — SIGNIFICANT CHANGE UP (ref 0–6)
ESTIMATED AVERAGE GLUCOSE: 117 MG/DL — HIGH (ref 68–114)
FERRITIN SERPL-MCNC: 339 NG/ML — HIGH (ref 15–150)
GLUCOSE BLDC GLUCOMTR-MCNC: 102 MG/DL — HIGH (ref 70–99)
GLUCOSE BLDC GLUCOMTR-MCNC: 106 MG/DL — HIGH (ref 70–99)
GLUCOSE SERPL-MCNC: 118 MG/DL — HIGH (ref 70–99)
HCT VFR BLD CALC: 39.7 % — SIGNIFICANT CHANGE UP (ref 34.5–45)
HCV AB S/CO SERPL IA: 0.07 S/CO — SIGNIFICANT CHANGE UP
HCV AB SERPL-IMP: SIGNIFICANT CHANGE UP
HDLC SERPL-MCNC: 34 MG/DL — LOW
HGB BLD-MCNC: 12.7 G/DL — SIGNIFICANT CHANGE UP (ref 11.5–15.5)
IMM GRANULOCYTES NFR BLD AUTO: 0.8 % — SIGNIFICANT CHANGE UP (ref 0–1.5)
LIPID PNL WITH DIRECT LDL SERPL: 50 MG/DL — SIGNIFICANT CHANGE UP
LYMPHOCYTES # BLD AUTO: 0.8 K/UL — LOW (ref 1–3.3)
LYMPHOCYTES # BLD AUTO: 31.7 % — SIGNIFICANT CHANGE UP (ref 13–44)
MAGNESIUM SERPL-MCNC: 2 MG/DL — SIGNIFICANT CHANGE UP (ref 1.6–2.6)
MCHC RBC-ENTMCNC: 27.1 PG — SIGNIFICANT CHANGE UP (ref 27–34)
MCHC RBC-ENTMCNC: 32 GM/DL — SIGNIFICANT CHANGE UP (ref 32–36)
MCV RBC AUTO: 84.6 FL — SIGNIFICANT CHANGE UP (ref 80–100)
MONOCYTES # BLD AUTO: 0.16 K/UL — SIGNIFICANT CHANGE UP (ref 0–0.9)
MONOCYTES NFR BLD AUTO: 6.3 % — SIGNIFICANT CHANGE UP (ref 2–14)
NEUTROPHILS # BLD AUTO: 1.54 K/UL — LOW (ref 1.8–7.4)
NEUTROPHILS NFR BLD AUTO: 61.2 % — SIGNIFICANT CHANGE UP (ref 43–77)
NON HDL CHOLESTEROL: 70 MG/DL — SIGNIFICANT CHANGE UP
NRBC # BLD: 0 /100 WBCS — SIGNIFICANT CHANGE UP (ref 0–0)
PHOSPHATE SERPL-MCNC: 3.8 MG/DL — SIGNIFICANT CHANGE UP (ref 2.5–4.5)
PLATELET # BLD AUTO: 152 K/UL — SIGNIFICANT CHANGE UP (ref 150–400)
POTASSIUM SERPL-MCNC: 4.3 MMOL/L — SIGNIFICANT CHANGE UP (ref 3.5–5.3)
POTASSIUM SERPL-SCNC: 4.3 MMOL/L — SIGNIFICANT CHANGE UP (ref 3.5–5.3)
PROCALCITONIN SERPL-MCNC: 0.08 NG/ML — SIGNIFICANT CHANGE UP (ref 0.02–0.1)
PROT SERPL-MCNC: 6.7 G/DL — SIGNIFICANT CHANGE UP (ref 6–8.3)
RBC # BLD: 4.69 M/UL — SIGNIFICANT CHANGE UP (ref 3.8–5.2)
RBC # FLD: 12.8 % — SIGNIFICANT CHANGE UP (ref 10.3–14.5)
SODIUM SERPL-SCNC: 140 MMOL/L — SIGNIFICANT CHANGE UP (ref 135–145)
SPECIMEN SOURCE: SIGNIFICANT CHANGE UP
TRIGL SERPL-MCNC: 100 MG/DL — SIGNIFICANT CHANGE UP
WBC # BLD: 2.52 K/UL — LOW (ref 3.8–10.5)
WBC # FLD AUTO: 2.52 K/UL — LOW (ref 3.8–10.5)

## 2020-11-30 PROCEDURE — 99223 1ST HOSP IP/OBS HIGH 75: CPT | Mod: CS,GC

## 2020-11-30 RX ORDER — INSULIN LISPRO 100/ML
VIAL (ML) SUBCUTANEOUS
Refills: 0 | Status: DISCONTINUED | OUTPATIENT
Start: 2020-11-30 | End: 2020-12-01

## 2020-11-30 RX ORDER — LEVOTHYROXINE SODIUM 125 MCG
1 TABLET ORAL
Qty: 0 | Refills: 0 | DISCHARGE

## 2020-11-30 RX ORDER — GUAIFENESIN/DEXTROMETHORPHAN 600MG-30MG
10 TABLET, EXTENDED RELEASE 12 HR ORAL ONCE
Refills: 0 | Status: COMPLETED | OUTPATIENT
Start: 2020-11-30 | End: 2020-11-30

## 2020-11-30 RX ORDER — DEXAMETHASONE 0.5 MG/5ML
6 ELIXIR ORAL EVERY 24 HOURS
Refills: 0 | Status: DISCONTINUED | OUTPATIENT
Start: 2020-11-30 | End: 2020-12-01

## 2020-11-30 RX ORDER — METOCLOPRAMIDE HCL 10 MG
10 TABLET ORAL EVERY 6 HOURS
Refills: 0 | Status: DISCONTINUED | OUTPATIENT
Start: 2020-11-30 | End: 2020-11-30

## 2020-11-30 RX ORDER — ENOXAPARIN SODIUM 100 MG/ML
40 INJECTION SUBCUTANEOUS EVERY 12 HOURS
Refills: 0 | Status: DISCONTINUED | OUTPATIENT
Start: 2020-11-30 | End: 2020-12-01

## 2020-11-30 RX ORDER — DEXAMETHASONE 0.5 MG/5ML
6 ELIXIR ORAL EVERY 24 HOURS
Refills: 0 | Status: DISCONTINUED | OUTPATIENT
Start: 2020-11-30 | End: 2020-11-30

## 2020-11-30 RX ORDER — GUAIFENESIN/DEXTROMETHORPHAN 600MG-30MG
10 TABLET, EXTENDED RELEASE 12 HR ORAL ONCE
Refills: 0 | Status: DISCONTINUED | OUTPATIENT
Start: 2020-11-30 | End: 2020-11-30

## 2020-11-30 RX ORDER — ACETAMINOPHEN 500 MG
650 TABLET ORAL EVERY 6 HOURS
Refills: 0 | Status: DISCONTINUED | OUTPATIENT
Start: 2020-11-30 | End: 2020-12-01

## 2020-11-30 RX ORDER — OMEPRAZOLE 10 MG/1
1 CAPSULE, DELAYED RELEASE ORAL
Qty: 0 | Refills: 0 | DISCHARGE

## 2020-11-30 RX ORDER — SIMVASTATIN 20 MG/1
20 TABLET, FILM COATED ORAL AT BEDTIME
Refills: 0 | Status: DISCONTINUED | OUTPATIENT
Start: 2020-11-30 | End: 2020-12-01

## 2020-11-30 RX ORDER — CHOLECALCIFEROL (VITAMIN D3) 125 MCG
1 CAPSULE ORAL
Qty: 0 | Refills: 0 | DISCHARGE

## 2020-11-30 RX ORDER — LEVOTHYROXINE SODIUM 125 MCG
137 TABLET ORAL DAILY
Refills: 0 | Status: DISCONTINUED | OUTPATIENT
Start: 2020-11-30 | End: 2020-12-01

## 2020-11-30 RX ADMIN — Medication 650 MILLIGRAM(S): at 22:24

## 2020-11-30 RX ADMIN — Medication 650 MILLIGRAM(S): at 13:14

## 2020-11-30 RX ADMIN — Medication 10 MILLILITER(S): at 22:25

## 2020-11-30 RX ADMIN — Medication 137 MICROGRAM(S): at 05:18

## 2020-11-30 RX ADMIN — Medication 650 MILLIGRAM(S): at 01:40

## 2020-11-30 RX ADMIN — Medication 650 MILLIGRAM(S): at 23:05

## 2020-11-30 RX ADMIN — Medication 650 MILLIGRAM(S): at 14:15

## 2020-11-30 RX ADMIN — Medication 6 MILLIGRAM(S): at 06:24

## 2020-11-30 RX ADMIN — Medication 650 MILLIGRAM(S): at 00:41

## 2020-11-30 RX ADMIN — ENOXAPARIN SODIUM 40 MILLIGRAM(S): 100 INJECTION SUBCUTANEOUS at 06:23

## 2020-11-30 RX ADMIN — ENOXAPARIN SODIUM 40 MILLIGRAM(S): 100 INJECTION SUBCUTANEOUS at 22:25

## 2020-11-30 NOTE — H&P ADULT - ATTENDING COMMENTS
patient seen and examined overnight     reviewed pertinent data , h&p    PE findings as above, pt morbidly obese w/ large, nontender abdomen, +abdominal hernia ; +bibasilar crackles; pox=90% RA while asleep increasing to 94-96%RA while awake, decreasing per RN to 86% while ambulaing     1. COVID w/ respiratory failure: started on o2 supplementation, wean off as tolerated; c/w decadron (started in ED), does not qualify for remdesivir o/n per ICU attending.     rest of  plan as above, with noted addenda

## 2020-11-30 NOTE — H&P ADULT - NSHPLABSRESULTS_GEN_ALL_CORE
.  LABS:                         14.6   3.90  )-----------( 113      ( 2020 15:37 )             43.4         136  |  101  |  8   ----------------------------<  102<H>  4.2   |  22  |  0.72    Ca    9.3      2020 15:37    TPro  7.9  /  Alb  3.5  /  TBili  0.6  /  DBili  x   /  AST  26  /  ALT  13  /  AlkPhos  64      PT/INR - ( 2020 15:37 )   PT: 11.8 sec;   INR: 0.98          PTT - ( 2020 15:37 )  PTT:24.5 sec  Urinalysis Basic - ( 2020 16:07 )    Color: Yellow / Appearance: Clear / S.020 / pH: x  Gluc: x / Ketone: 15 mg/dL  / Bili: Small / Urobili: 0.2 E.U./dL   Blood: x / Protein: Trace mg/dL / Nitrite: NEGATIVE   Leuk Esterase: NEGATIVE / RBC: < 5 /HPF / WBC < 5 /HPF   Sq Epi: x / Non Sq Epi: 0-5 /HPF / Bacteria: Present /HPF      CARDIAC MARKERS ( 2020 17:10 )  <0.017 ng/mL / x     / x     / x     / x          Serum Pro-Brain Natriuretic Peptide: 101 pg/mL ( @ 17:10)    Lactate, Blood: 1.0 mmoL/L ( @ 15:37)      RADIOLOGY, EKG & ADDITIONAL TESTS: Reviewed.   < from: CT Chest No Cont (20 @ 16:03) >    FINDINGS: CT of the chest was performed without the administration of intravenous contrast. Reconstructions were performed in the sagittal and coronal planes. No prior CT of the chest is available for comparison.    Evaluation of the chest demonstrates that the visualized thyroid gland is normal in appearance. There is mild cardiomegaly. Fatty infiltration of the inferior wall of the left ventricle extending into the interventricular septum, consistent with prior infarct. Negative for enlargement of the main pulmonary artery. Negative for enlargement of the ascending thoracic aorta. Severe coronary arterial calcification. Negative for aortic valvular calcification. No pleural and no pericardial effusion. Negative for thoracic inlet, axillary, mediastinal or hilar lymphadenopathy with increased number of mildly enlarged clustered lymph nodes within the pretracheal, prevascular, AP window and subcarinal regions. Evaluation of the lung parenchyma demonstrates moderate multifocal regions of peripherally based groundglass opacity throughout the lung parenchyma with superimposed peripherally based regions of peripheral bandlike scarring within the left lower lobe. Evaluation of the unenhanced upper abdomen demonstrates very severe hepatic steatosis. Partially visualized enlargement of the spleen spanning 13.2 cm. Mild partially visualized vascular calcification. Small hiatal hernia. Osseous structures are unremarkable.      IMPRESSION:    Findings of mild to moderate COVID pneumonitis.      < from: CT Angio Chest PE Protocol w/ IV Cont (20 @ 21:04) >    IMPRESSION:  No pulmonary embolism. No change since prior study from earlier on 2020.

## 2020-11-30 NOTE — H&P ADULT - PROBLEM SELECTOR PLAN 5
F: none  E: K<4 Mg<2  N: liquid diet  A: Increase as tolerated    DVT ppx: lovenox 40 q12  GI ppx: none    Full Code ADDENDUM: monitor respiratory status, check A1c , lipid panel

## 2020-11-30 NOTE — H&P ADULT - PROBLEM SELECTOR PLAN 2
Likely secondary to starvation ketosis in the setting of poor po intake from COVID-19  - QTc prolonged to 490, avoid zofran  - Daily EKG  - reglan 10mg PRN  - encourage po intake ADDENDUM: on o2 supplementation, wean off as tolerated, see above plan ADDENDUM: started on o2 supplementation, wean off as tolerated, see above plan

## 2020-11-30 NOTE — H&P ADULT - NSICDXPASTSURGICALHX_GEN_ALL_CORE_FT
PAST SURGICAL HISTORY:  H/O hernia repair incisional x 2    H/O myomectomy     History of hysterectomy partial

## 2020-11-30 NOTE — H&P ADULT - PROBLEM SELECTOR PLAN 6
F: none  E: K<4 Mg<2  N: liquid diet  A: Increase as tolerated    DVT ppx: lovenox 40 q12  GI ppx: none    Full Code

## 2020-11-30 NOTE — PROGRESS NOTE ADULT - PROBLEM SELECTOR PLAN 5
a1c of 5.7, LDL 50, HDL 34, triglycerides 100, cholesterol total 104. ASCVD score of 6.9%  -Simvastatin 20mg daily

## 2020-11-30 NOTE — PROGRESS NOTE ADULT - SUBJECTIVE AND OBJECTIVE BOX
O/N Events: Patient admitted overnight.   Subjective/ROS: Patient denies any new symptoms. States she still has a dry cough and shortness of breath.  Denies HA, CP, n/v, changes in bowel/urinary habits.  12pt ROS otherwise negative.    VITALS  Vital Signs Last 24 Hrs  T(C): 37.1 (2020 08:53), Max: 39.1 (2020 17:11)  T(F): 98.7 (2020 08:53), Max: 102.3 (2020 17:11)  HR: 88 (2020 08:40) (83 - 104)  BP: 121/66 (2020 08:40) (109/70 - 132/75)  BP(mean): 84 (2020 06:00) (84 - 97)  RR: 18 (2020 08:40) (18 - 20)  SpO2: 93% (2020 08:40) (91% - 98%)    CAPILLARY BLOOD GLUCOSE          PHYSICAL EXAM  General: A&Ox3; NAD, conversing well on room air  Head: NC/AT; MMM; PERRL; EOMI;  Neck: Supple; no JVD  Respiratory: Crackles bilaterally mid and lower lung fields  Cardiovascular: Regular rhythm/rate; S1/S2   Gastrointestinal: Soft; NTND; normoactive BS  Extremities: WWP; no edema/cyanosis  Neurological:  CNII-XII grossly intact; no obvious focal deficits    MEDICATIONS  (STANDING):  dexAMETHasone     Tablet 6 milliGRAM(s) Oral every 24 hours  enoxaparin Injectable 40 milliGRAM(s) SubCutaneous every 12 hours  levothyroxine 137 MICROGram(s) Oral daily    MEDICATIONS  (PRN):  acetaminophen   Tablet .. 650 milliGRAM(s) Oral every 6 hours PRN Temp greater or equal to 38C (100.4F), Mild Pain (1 - 3)      penicillin (Hives)  sulfa drugs (Rash)      LABS                        12.7   2.52  )-----------( 152      ( 2020 05:58 )             39.7     11-30    140  |  103  |  8   ----------------------------<  118<H>  4.3   |  22  |  0.56    Ca    8.8      2020 05:58  Phos  3.8     11-30  Mg     2.0     11-30    TPro  6.7  /  Alb  3.6  /  TBili  0.4  /  DBili  x   /  AST  17  /  ALT  14  /  AlkPhos  53  11-30    PT/INR - ( 2020 15:37 )   PT: 11.8 sec;   INR: 0.98          PTT - ( 2020 15:37 )  PTT:24.5 sec  Urinalysis Basic - ( 2020 16:07 )    Color: Yellow / Appearance: Clear / S.020 / pH: x  Gluc: x / Ketone: 15 mg/dL  / Bili: Small / Urobili: 0.2 E.U./dL   Blood: x / Protein: Trace mg/dL / Nitrite: NEGATIVE   Leuk Esterase: NEGATIVE / RBC: < 5 /HPF / WBC < 5 /HPF   Sq Epi: x / Non Sq Epi: 0-5 /HPF / Bacteria: Present /HPF      CARDIAC MARKERS ( 2020 17:10 )  <0.017 ng/mL / x     / x     / x     / x

## 2020-11-30 NOTE — H&P ADULT - PROBLEM SELECTOR PLAN 1
Diagnosed with Covid-19 11/23, but symptomatic since 11/20 with fevers, chills, sore throat, myalgias and nausea. Currently satting well on RA  - Patient does not warrant decadron or remdesivir at this time as she is not requiring O2  - Monitor O2 requirements  - Daily Covid Labs Diagnosed with Covid-19 11/23, but symptomatic since 11/20 with fevers, chills, sore throat, myalgias and nausea. Currently satting well on RA  - Patient desatted while ambulatory to 86%, started decadron 6mg for 10 days or until patient is discharged.  - Patient satting well at rest, does not qualify for remdesivir at this time   - Monitor O2 requirements  - Daily Covid Labs

## 2020-11-30 NOTE — H&P ADULT - NSHPPHYSICALEXAM_GEN_ALL_CORE
.  VITAL SIGNS:  T(C): 38.2 (11-30-20 @ 00:21), Max: 39.1 (11-29-20 @ 17:11)  T(F): 100.8 (11-30-20 @ 00:21), Max: 102.3 (11-29-20 @ 17:11)  HR: 104 (11-30-20 @ 00:21) (94 - 104)  BP: 132/75 (11-30-20 @ 00:21) (109/70 - 132/75)  BP(mean): 97 (11-30-20 @ 00:21) (97 - 97)  RR: 20 (11-30-20 @ 00:21) (18 - 20)  SpO2: 95% (11-30-20 @ 00:21) (91% - 98%)  Wt(kg): --    PHYSICAL EXAM:    Constitutional: WDWN resting comfortably in bed; NAD  Head: NC/AT  Eyes: PERRL, EOMI, anicteric sclera  ENT: no nasal discharge;  dry mucous membranes  Neck: no JVD or thyromegaly  Respiratory: bibasilar crackles appreciated, difficult to auscultate as patient persistently coughed when inspiring  Cardiac: +S1/S2; RRR; no M/R/G; PMI non-displaced  Gastrointestinal: abdomen soft, NT/ND; no rebound or guarding; +BSx4  Extremities: WWP, no clubbing or cyanosis; no peripheral edema  Musculoskeletal: NROM x4; no joint swelling, tenderness or erythema  Vascular: 2+ radial, femoral, DP/PT pulses B/L  Dermatologic: skin warm, dry and intact; no rashes, wounds, or scars  Lymphatic: no submandibular or cervical LAD  Neurologic: AAOx3; CNII-XII grossly intact; no focal deficits  Psychiatric: affect and characteristics of appearance, verbalizations, behaviors are appropriate

## 2020-11-30 NOTE — H&P ADULT - NSHPSOCIALHISTORY_GEN_ALL_CORE
Patient is a former smoker, quitting in 1979 after smoking for 10 years. She denies any alcohol or drug use.

## 2020-11-30 NOTE — H&P ADULT - PROBLEM SELECTOR PLAN 3
Takes levothyroxine 137mcg daily  - c/w home medication Likely secondary to starvation ketosis in the setting of poor po intake from COVID-19  - QTc prolonged to 490, avoid zofran  - Daily EKG  - reglan 10mg PRN  - encourage po intake

## 2020-11-30 NOTE — H&P ADULT - PROBLEM SELECTOR PLAN 4
F: none  E: K<4 Mg<2  N: liquid diet  A: Increase as tolerated    DVT ppx: lovenox 40 q12  GI ppx: none    Full Code Takes levothyroxine 137mcg daily  - c/w home medication

## 2020-11-30 NOTE — PROGRESS NOTE ADULT - PROBLEM SELECTOR PLAN 1
Diagnosed with Covid-19 11/23, but symptomatic since 11/20 with fevers, chills, sore throat, myalgias and nausea. Currently satting well on RA  - COVID PCR positive 11/23  - Decadron 6mg QD x10 doses 11/30-12/9  - Wean O2 requirements as tolerated  - Daily COVID labs (D-dimer, ferritin, CRP)  - Monitor LFTs while on remdesivir  - ISS while on decadron

## 2020-11-30 NOTE — H&P ADULT - HISTORY OF PRESENT ILLNESS
71 yo female with hx hypothyroidism presents with worsening myalgias, fever, sore throat, and dyspnea since she tested positive for covid 19 on 11/21. She was first symptomatic on Friday, 11/20, when she noticed she had a fever. The next day, she went to an urgent care and tested positive for Covid-19. Since then, she has been quarantining at her home, taking tylenol for her fevers and muscle aches. She also reports decreased po intake over several days due to nausea that periodically resolves with zofran. She came to the ED today because she noticed that she has become increasingly short of breath and has coughing fits when she lays down. She denies any HA, anosmia, loss of taste, chest pain, palpitations, abdominal pain, vomiting, diarrhea, or dysuria.    ED Course: febrile to 101.8, HR 's, 109/70, RR 18-20, satting 95-98% on RA (periodically on O2 for unclear reason at [facility])  Vitals significant for D-dimer 10,000, CRP 5.3, trop negative  CTPE negative for PE, showing GGO consistent with COVID-19  Patient given cef/azithro, decadron 6, tylenol, lativan 1mg and 1.8 L NS

## 2020-12-01 ENCOUNTER — TRANSCRIPTION ENCOUNTER (OUTPATIENT)
Age: 70
End: 2020-12-01

## 2020-12-01 VITALS
OXYGEN SATURATION: 95 % | SYSTOLIC BLOOD PRESSURE: 130 MMHG | HEART RATE: 80 BPM | TEMPERATURE: 98 F | RESPIRATION RATE: 18 BRPM | DIASTOLIC BLOOD PRESSURE: 65 MMHG

## 2020-12-01 DIAGNOSIS — E03.9 HYPOTHYROIDISM, UNSPECIFIED: ICD-10-CM

## 2020-12-01 DIAGNOSIS — R73.03 PREDIABETES: ICD-10-CM

## 2020-12-01 LAB
ALBUMIN SERPL ELPH-MCNC: 3.5 G/DL — SIGNIFICANT CHANGE UP (ref 3.3–5)
ALP SERPL-CCNC: 54 U/L — SIGNIFICANT CHANGE UP (ref 40–120)
ALT FLD-CCNC: 15 U/L — SIGNIFICANT CHANGE UP (ref 10–45)
ANION GAP SERPL CALC-SCNC: 15 MMOL/L — SIGNIFICANT CHANGE UP (ref 5–17)
AST SERPL-CCNC: 18 U/L — SIGNIFICANT CHANGE UP (ref 10–40)
BASOPHILS # BLD AUTO: 0 K/UL — SIGNIFICANT CHANGE UP (ref 0–0.2)
BASOPHILS NFR BLD AUTO: 0 % — SIGNIFICANT CHANGE UP (ref 0–2)
BILIRUB SERPL-MCNC: 0.4 MG/DL — SIGNIFICANT CHANGE UP (ref 0.2–1.2)
BUN SERPL-MCNC: 14 MG/DL — SIGNIFICANT CHANGE UP (ref 7–23)
CALCIUM SERPL-MCNC: 8.7 MG/DL — SIGNIFICANT CHANGE UP (ref 8.4–10.5)
CHLORIDE SERPL-SCNC: 102 MMOL/L — SIGNIFICANT CHANGE UP (ref 96–108)
CO2 SERPL-SCNC: 21 MMOL/L — LOW (ref 22–31)
CREAT SERPL-MCNC: 0.6 MG/DL — SIGNIFICANT CHANGE UP (ref 0.5–1.3)
CRP SERPL-MCNC: 3.57 MG/DL — HIGH (ref 0–0.4)
D DIMER BLD IA.RAPID-MCNC: 189 NG/ML DDU — SIGNIFICANT CHANGE UP
EOSINOPHIL # BLD AUTO: 0 K/UL — SIGNIFICANT CHANGE UP (ref 0–0.5)
EOSINOPHIL NFR BLD AUTO: 0 % — SIGNIFICANT CHANGE UP (ref 0–6)
FERRITIN SERPL-MCNC: 392 NG/ML — HIGH (ref 15–150)
GLUCOSE BLDC GLUCOMTR-MCNC: 103 MG/DL — HIGH (ref 70–99)
GLUCOSE BLDC GLUCOMTR-MCNC: 158 MG/DL — HIGH (ref 70–99)
GLUCOSE SERPL-MCNC: 110 MG/DL — HIGH (ref 70–99)
HCT VFR BLD CALC: 38.5 % — SIGNIFICANT CHANGE UP (ref 34.5–45)
HGB BLD-MCNC: 12.5 G/DL — SIGNIFICANT CHANGE UP (ref 11.5–15.5)
IMM GRANULOCYTES NFR BLD AUTO: 0.6 % — SIGNIFICANT CHANGE UP (ref 0–1.5)
LYMPHOCYTES # BLD AUTO: 1 K/UL — SIGNIFICANT CHANGE UP (ref 1–3.3)
LYMPHOCYTES # BLD AUTO: 19.7 % — SIGNIFICANT CHANGE UP (ref 13–44)
MAGNESIUM SERPL-MCNC: 1.7 MG/DL — SIGNIFICANT CHANGE UP (ref 1.6–2.6)
MCHC RBC-ENTMCNC: 27.3 PG — SIGNIFICANT CHANGE UP (ref 27–34)
MCHC RBC-ENTMCNC: 32.5 GM/DL — SIGNIFICANT CHANGE UP (ref 32–36)
MCV RBC AUTO: 84.1 FL — SIGNIFICANT CHANGE UP (ref 80–100)
MONOCYTES # BLD AUTO: 0.26 K/UL — SIGNIFICANT CHANGE UP (ref 0–0.9)
MONOCYTES NFR BLD AUTO: 5.1 % — SIGNIFICANT CHANGE UP (ref 2–14)
NEUTROPHILS # BLD AUTO: 3.78 K/UL — SIGNIFICANT CHANGE UP (ref 1.8–7.4)
NEUTROPHILS NFR BLD AUTO: 74.6 % — SIGNIFICANT CHANGE UP (ref 43–77)
NRBC # BLD: 0 /100 WBCS — SIGNIFICANT CHANGE UP (ref 0–0)
PHOSPHATE SERPL-MCNC: 2.7 MG/DL — SIGNIFICANT CHANGE UP (ref 2.5–4.5)
PLATELET # BLD AUTO: 156 K/UL — SIGNIFICANT CHANGE UP (ref 150–400)
POTASSIUM SERPL-MCNC: 3.5 MMOL/L — SIGNIFICANT CHANGE UP (ref 3.5–5.3)
POTASSIUM SERPL-SCNC: 3.5 MMOL/L — SIGNIFICANT CHANGE UP (ref 3.5–5.3)
PROT SERPL-MCNC: 6.6 G/DL — SIGNIFICANT CHANGE UP (ref 6–8.3)
RBC # BLD: 4.58 M/UL — SIGNIFICANT CHANGE UP (ref 3.8–5.2)
RBC # FLD: 12.9 % — SIGNIFICANT CHANGE UP (ref 10.3–14.5)
SODIUM SERPL-SCNC: 138 MMOL/L — SIGNIFICANT CHANGE UP (ref 135–145)
WBC # BLD: 5.07 K/UL — SIGNIFICANT CHANGE UP (ref 3.8–10.5)
WBC # FLD AUTO: 5.07 K/UL — SIGNIFICANT CHANGE UP (ref 3.8–10.5)

## 2020-12-01 PROCEDURE — 84484 ASSAY OF TROPONIN QUANT: CPT

## 2020-12-01 PROCEDURE — 85610 PROTHROMBIN TIME: CPT

## 2020-12-01 PROCEDURE — 96375 TX/PRO/DX INJ NEW DRUG ADDON: CPT | Mod: XU

## 2020-12-01 PROCEDURE — 85730 THROMBOPLASTIN TIME PARTIAL: CPT

## 2020-12-01 PROCEDURE — 36415 COLL VENOUS BLD VENIPUNCTURE: CPT

## 2020-12-01 PROCEDURE — 83605 ASSAY OF LACTIC ACID: CPT

## 2020-12-01 PROCEDURE — 86803 HEPATITIS C AB TEST: CPT

## 2020-12-01 PROCEDURE — 96366 THER/PROPH/DIAG IV INF ADDON: CPT | Mod: XU

## 2020-12-01 PROCEDURE — 84100 ASSAY OF PHOSPHORUS: CPT

## 2020-12-01 PROCEDURE — 99239 HOSP IP/OBS DSCHRG MGMT >30: CPT | Mod: CS

## 2020-12-01 PROCEDURE — 82728 ASSAY OF FERRITIN: CPT

## 2020-12-01 PROCEDURE — 96368 THER/DIAG CONCURRENT INF: CPT | Mod: XU

## 2020-12-01 PROCEDURE — 83036 HEMOGLOBIN GLYCOSYLATED A1C: CPT

## 2020-12-01 PROCEDURE — 86140 C-REACTIVE PROTEIN: CPT

## 2020-12-01 PROCEDURE — 87086 URINE CULTURE/COLONY COUNT: CPT

## 2020-12-01 PROCEDURE — 71250 CT THORAX DX C-: CPT

## 2020-12-01 PROCEDURE — 80053 COMPREHEN METABOLIC PANEL: CPT

## 2020-12-01 PROCEDURE — 96365 THER/PROPH/DIAG IV INF INIT: CPT | Mod: XU

## 2020-12-01 PROCEDURE — 87040 BLOOD CULTURE FOR BACTERIA: CPT

## 2020-12-01 PROCEDURE — 71275 CT ANGIOGRAPHY CHEST: CPT

## 2020-12-01 PROCEDURE — 80061 LIPID PANEL: CPT

## 2020-12-01 PROCEDURE — 81001 URINALYSIS AUTO W/SCOPE: CPT

## 2020-12-01 PROCEDURE — 83735 ASSAY OF MAGNESIUM: CPT

## 2020-12-01 PROCEDURE — 82962 GLUCOSE BLOOD TEST: CPT

## 2020-12-01 PROCEDURE — 99285 EMERGENCY DEPT VISIT HI MDM: CPT | Mod: 25

## 2020-12-01 PROCEDURE — 85025 COMPLETE CBC W/AUTO DIFF WBC: CPT

## 2020-12-01 PROCEDURE — 83880 ASSAY OF NATRIURETIC PEPTIDE: CPT

## 2020-12-01 PROCEDURE — 85379 FIBRIN DEGRADATION QUANT: CPT

## 2020-12-01 PROCEDURE — 84145 PROCALCITONIN (PCT): CPT

## 2020-12-01 PROCEDURE — 93005 ELECTROCARDIOGRAM TRACING: CPT

## 2020-12-01 PROCEDURE — 87635 SARS-COV-2 COVID-19 AMP PRB: CPT

## 2020-12-01 RX ORDER — SIMVASTATIN 20 MG/1
1 TABLET, FILM COATED ORAL
Qty: 30 | Refills: 0
Start: 2020-12-01

## 2020-12-01 RX ORDER — POTASSIUM CHLORIDE 20 MEQ
20 PACKET (EA) ORAL ONCE
Refills: 0 | Status: COMPLETED | OUTPATIENT
Start: 2020-12-01 | End: 2020-12-01

## 2020-12-01 RX ORDER — DEXAMETHASONE 0.5 MG/5ML
1 ELIXIR ORAL
Qty: 8 | Refills: 0
Start: 2020-12-01

## 2020-12-01 RX ADMIN — Medication 6 MILLIGRAM(S): at 06:28

## 2020-12-01 RX ADMIN — Medication 1: at 12:56

## 2020-12-01 RX ADMIN — Medication 20 MILLIEQUIVALENT(S): at 11:37

## 2020-12-01 RX ADMIN — Medication 650 MILLIGRAM(S): at 11:37

## 2020-12-01 RX ADMIN — ENOXAPARIN SODIUM 40 MILLIGRAM(S): 100 INJECTION SUBCUTANEOUS at 10:51

## 2020-12-01 RX ADMIN — Medication 650 MILLIGRAM(S): at 12:30

## 2020-12-01 RX ADMIN — Medication 300 MILLIGRAM(S): at 06:29

## 2020-12-01 RX ADMIN — Medication 137 MICROGRAM(S): at 06:30

## 2020-12-01 RX ADMIN — Medication 62.5 MILLIMOLE(S): at 12:56

## 2020-12-01 NOTE — DISCHARGE NOTE PROVIDER - PROVIDER TOKENS
FREE:[LAST:[Jamir],PHONE:[phone]],FAX:[phone]   [phone]    -],ADDRESS:[[facility] [address] [place], [place] [address], [place] [address]]] FREE:[LAST:[[place]],PHONE:[phone]:[phone]   [phone]    -],ADDRESS:[[address] [facility] [address]]],FREE:[LAST:[[place]],FIRST:[Hilario MAGAÑA],PHONE:[phone]:[phone]:[[facility]],SCHEDULEDAPPT:[12/04/2020],SCHEDULEDAPPTTIME:[03:20 PM]] FREE:[LAST:[Hook],FIRST:[Hilario MAGAÑA],PHONE:[phone]:[phone]:[[facility]],SCHEDULEDAPPT:[12/04/2020],SCHEDULEDAPPTTIME:[03:20 PM]],PROVIDER:[TOKEN:[[id number]]]

## 2020-12-01 NOTE — PROGRESS NOTE ADULT - PROBLEM SELECTOR PLAN 1
clinically-improved, inflammatory markers down-trending, no longer requires supplemental O2; cont. supportive care, contact/droplet precautions, steroids

## 2020-12-01 NOTE — DISCHARGE NOTE PROVIDER - NSDCCPTREATMENT_GEN_ALL_CORE_FT
PRINCIPAL PROCEDURE  Procedure: CT chest wo con  Findings and Treatment: Study Perfomred 11/29  FINDINGS: CT of the chest was performed without the administration of intravenous contrast. Reconstructions were performed in the sagittal and coronal planes. No prior CT of the chest is available for comparison.  Evaluation of the chest demonstrates that the visualized thyroid gland is normal in appearance. There is mild cardiomegaly. Fatty infiltration of the inferior wall of the left ventricle extending into the interventricular septum, consistent with prior infarct. Negative for enlargement of the main pulmonary artery. Negative for enlargement of the ascending thoracic aorta. Severe coronary arterial calcification. Negative for aortic valvular calcification. No pleural and no pericardial effusion. Negative for thoracic inlet, axillary, mediastinal or hilar lymphadenopathy with increased number of mildly enlarged clustered lymph nodes within the pretracheal, prevascular, AP window and subcarinal regions. Evaluation of the lung parenchyma demonstrates moderate multifocal regions of peripherally based groundglass opacity throughout the lung parenchyma with superimposed peripherally based regions of peripheral bandlike scarring within the left lower lobe. Evaluation of the unenhanced upper abdomen demonstrates very severe hepatic steatosis. Partially visualized enlargement of the spleen spanning 13.2 cm. Mild partially visualized vascular calcification. Small hiatal hernia. Osseous structures are unremarkable.  IMPRESSION:  Findings of mild to moderate COVID pneumonitis.      SECONDARY PROCEDURE  Procedure: CT chest w con  Findings and Treatment: Study Performed on 11/29  No pulmonary embolism is identified. The main pulmonary artery is again borderline dilated measuring 3.2 cm. The aorta is normal in caliber. 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  There has been no substantial change in the bilateral groundglass and consolidative opacities, mainly peripheral lung in the mid and lower lung zones, consistent with history of COVID-19 infection.  The remainder of examination is not significantly changed from prior chest CT performed approximately 5 hours prior.  IMPRESSION:  No pulmonary embolism. No change since prior study from earlier on 11/29/2020.     PRINCIPAL PROCEDURE  Procedure: CT chest wo con  Findings and Treatment: Date of study: 11/29  FINDINGS: CT of the chest was performed without the administration of intravenous contrast. Reconstructions were performed in the sagittal and coronal planes. No prior CT of the chest is available for comparison.  Evaluation of the chest demonstrates that the visualized thyroid gland is normal in appearance. There is mild cardiomegaly. Fatty infiltration of the inferior wall of the left ventricle extending into the interventricular septum, consistent with prior infarct. Negative for enlargement of the main pulmonary artery. Negative for enlargement of the ascending thoracic aorta. Severe coronary arterial calcification. Negative for aortic valvular calcification. No pleural and no pericardial effusion. Negative for thoracic inlet, axillary, mediastinal or hilar lymphadenopathy with increased number of mildly enlarged clustered lymph nodes within the pretracheal, prevascular, AP window and subcarinal regions. Evaluation of the lung parenchyma demonstrates moderate multifocal regions of peripherally based groundglass opacity throughout the lung parenchyma with superimposed peripherally based regions of peripheral bandlike scarring within the left lower lobe. Evaluation of the unenhanced upper abdomen demonstrates very severe hepatic steatosis. Partially visualized enlargement of the spleen spanning 13.2 cm. Mild partially visualized vascular calcification. Small hiatal hernia. Osseous structures are unremarkable.  IMPRESSION:  Findings of mild to moderate COVID pneumonitis.      SECONDARY PROCEDURE  Procedure: CT chest w con  Findings and Treatment: Date of study: 11/29   No pulmonary embolism is identified. The main pulmonary artery is again borderline dilated measuring 3.2 cm. The aorta is normal in caliber. 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  There has been no substantial change in the bilateral groundglass and consolidative opacities, mainly peripheral lung in the mid and lower lung zones, consistent with history of COVID-19 infection.  The remainder of examination is not significantly changed from prior chest CT performed approximately 5 hours prior.  IMPRESSION:  No pulmonary embolism. No change since prior study from earlier on 11/29/2020.

## 2020-12-01 NOTE — PROGRESS NOTE ADULT - ATTENDING COMMENTS
Dispo: d/c home today  Pt. wishes to establish primary care at [facility]  does not need home O2 or extended VTE ppx
Pt. seen and examined by me earlier today; I have read Dr. Flores's note, I agree w/ his findings and plan of care as documented

## 2020-12-01 NOTE — DISCHARGE NOTE PROVIDER - CARE PROVIDERS DIRECT ADDRESSES
,DirectAddress_Unknown ,DirectAddress_Unknown,DirectAddress_Unknown ,DirectAddress_Unknown,[email or web address]

## 2020-12-01 NOTE — DISCHARGE NOTE PROVIDER - NSDCFUADDAPPT_GEN_ALL_CORE_FT
[facility] is trying to make an appointment with the physician requested .  Due to limited availability, this may not happen. However every attempt will be made to accommodate your request. [facility] is trying to make an appointment with the physician requested .  Due to limited availability, this may not happen. However every attempt will be made to accommodate your request.     Please follow up with your Pulmonary Provider, Dr. Hilario Hook on 12/4/2020 at 3:20pm via Telehealth. Please note that appointment will take place over the phone. The office of Dr. Hilario Hook will contact you from [phone].    Appointment was scheduled by Ms. MARK Darling, Referral Coordinator. Please expect the office of your Internal Medicine Provider, Dr. Minh Flores on behalf of Dr. Del Whaley will contact you to schedule a follow up appointment within 2 weeks of your discharge. The office will contact you from [phone].    Please follow up with your Pulmonary Provider, Dr. Hilario Hook on 12/4/2020 at 3:20pm via Telehealth. Please note that appointment will take place over the phone. The office of Dr. Hilario Hook will contact you from [phone].    Appointment was scheduled by Ms. MARK Darling, Referral Coordinator.

## 2020-12-01 NOTE — DISCHARGE NOTE NURSING/CASE MANAGEMENT/SOCIAL WORK - NSDCFUADDAPPT_GEN_ALL_CORE_FT
Please expect the office of your Internal Medicine Provider, Dr. Minh Flores on behalf of Dr. Del Whaley will contact you to schedule a follow up appointment within 2 weeks of your discharge. The office will contact you from [phone].    Please follow up with your Pulmonary Provider, Dr. Hilario Hook on 12/4/2020 at 3:20pm via Telehealth. Please note that appointment will take place over the phone. The office of Dr. Hilario Hook will contact you from [phone].    Appointment was scheduled by Ms. MARK Darling, Referral Coordinator.

## 2020-12-01 NOTE — DISCHARGE NOTE PROVIDER - HOSPITAL COURSE
#Discharge: do not delete    Patient is 69 yo F with past medical history of  hypothyroidism   Presented with worsening myalgias, fever, sore throat, dyspnea found to have acute respiratory failure with hypoxia secondary to COVID -19.   Problem List/Main Diagnoses (system-based):     1. Acute respiratory failure with hypoxia secondary to COVID -19.   Patient was found to be COVID-19 positive on 11/23 however was experiencing symptoms since 11/20.       Inpatient treatment course:   New medications:   Labs to be followed outpatient:   Exam to be followed outpatient:    #Discharge: do not delete    Patient is 69 yo F with past medical history of  hypothyroidism   Presented with worsening myalgias, fever, sore throat, dyspnea found to have acute respiratory failure with hypoxia secondary to COVID -19.         Problem List/Main Diagnoses (system-based):     1. Acute hypoxic respiratory failure secondary to COVID-19: Patient tested positive for COVID-19 on 11/21.  While in the ED she was periodically put on supplemental oxygen for acute hypoxia.  During admission she was weaned off her supplemental oxygen (2L NC) on Novemeber 30.  Since then she hasnot become hypoxic at rest or with ambulation. CT with PE protocol ruled out pulmonary embolism. CT findings consistent with mild to moderate COVID pneumonitis.   - Decadron 6mg QD x10 doses 11/30-12/9  - Wean O2 requirements as tolerated  - Monitored LFTs while on remdesivir  - ISS while on decadron.     2. Ketosis:  Patient with decreased po intake due to nausea.   -PO intake encouraged   -Tigan prn for nausea     3. Hypothyroidism   -Continued Levothyroxine 137mcg daily     4. Morbid Obesity: Aa1c of 5.7, LDL 50, HDL 34, triglycerides 100, cholesterol total 104. ASCVD score of 6.9%.   -Simvastatin 20mg   -Nutrition education       New medications: Simvastatin 20mg daily, Decadron   Labs to be followed outpatient: None  Exam to be followed outpatient: None

## 2020-12-01 NOTE — DISCHARGE NOTE NURSING/CASE MANAGEMENT/SOCIAL WORK - PATIENT PORTAL LINK FT
You can access the FollowMyHealth Patient Portal offered by [facility] by registering at the following website: [email or web address]. By joining [facility]’s FollowMyHealth portal, you will also be able to view your health information using other applications (apps) compatible with our system.

## 2020-12-01 NOTE — DISCHARGE NOTE PROVIDER - NSDCMRMEDTOKEN_GEN_ALL_CORE_FT
levothyroxine 137 mcg (0.137 mg) oral tablet: 1 tab(s) orally once a day   dexamethasone 6 mg oral tablet: 1 tab(s) orally every 24 hours  levothyroxine 137 mcg (0.137 mg) oral tablet: 1 tab(s) orally once a day  simvastatin 20 mg oral tablet: 1 tab(s) orally once a day (at bedtime)

## 2020-12-01 NOTE — DISCHARGE NOTE PROVIDER - NSDCCPCAREPLAN_GEN_ALL_CORE_FT
PRINCIPAL DISCHARGE DIAGNOSIS  Diagnosis: COVID-19  Assessment and Plan of Treatment: You had signs and symptoms concerning for COVID-19. You tested postive. You were treated with11/23 while you were in the hospital. You have been clinically stable and deemed safe for discharge home to continue your treatment course and quarantine.  While you are at home you should stay in your own room whenever possible and wear a mask as much as possible. If you have to be in the same room as someone else, you should both wear a mask. If you share a restroom, you should wipe it down with bleach wipes between each use .Please continue to practice social distancing and good hand hygiene.  You need to quarantine for 14 days after your positive test result.   You will also have a pulse oximeter device to help you monitor your oxygen levels and heart rate.  If you do experience worsening shortness of breath at home, start to experience new chest pain or new leg pain, please call your doctor and consider coming back.   You will de discharged with a medication known as Dexamethasone (also knonw as Ozurdex, DexPak).  Please take this medication after your discharge.  Please take one pill by mouth in the morning daily till completion of your course.          SECONDARY DISCHARGE DIAGNOSES  Diagnosis: Morbid obesity  Assessment and Plan of Treatment: Obesity is a disease that affects nearly one-third of the adult American population (approximately 60 million). The number of overweight and obese Americans has continued to increase since 1960, a trend that is not slowing down. Today, 64.5 percent of adult Americans (about 127 million) are categorized as being overweight or obese. Each year, obesity causes at least 300,000 excess deaths in the [place] and healthcare costs of American adults with obesity amount to approximately $100 billion. (AOA).    -While in the hospital routine blood work was performed.  This blood work showed that your HDL (good cholesterol).  A hemogoblin a1c was also measured which shows that you are prediabetic. When this information is combined with your weight, you qualify for a drug known as a statin.  Statins lower your risk of heart problems and strokes. The statin that you are being prescribed is Simvastatin.  Please take one tablet of Simvastatin 20mg by mouth once a day.  Please follow  up with your outpatient physician to discuss this problem.    Diagnosis: Hypoxia  Assessment and Plan of Treatment:      PRINCIPAL DISCHARGE DIAGNOSIS  Diagnosis: COVID-19  Assessment and Plan of Treatment: You had signs and symptoms concerning for COVID-19. You tested postive on 11/23. You were treated with decadron while you were in the hospital. You have been clinically stable and deemed safe for discharge home to continue your treatment course and quarantine.  While you are at home you should stay in your own room whenever possible and wear a mask as much as possible. If you have to be in the same room as someone else, you should both wear a mask. If you share a restroom, you should wipe it down with bleach wipes between each use . Please continue to practice social distancing and good hand hygiene. You need to quarantine for 14 days after your positive test result.   You will also have a pulse oximeter device to help you monitor your oxygen levels and heart rate.  If you do experience worsening shortness of breath at home, start to experience new chest pain or new leg pain, please call your doctor and consider coming back.   You will de discharged with a medication known as Dexamethasone (also knonw as Ozurdex, DexPak).  Please take this medication after your discharge.  Please take one pill by mouth in the morning daily till completion of your course.      SECONDARY DISCHARGE DIAGNOSES  Diagnosis: Morbid obesity  Assessment and Plan of Treatment: Obesity is a disease that affects nearly one-third of the adult American population (approximately 60 million). The number of overweight and obese Americans has continued to increase since 1960, a trend that is not slowing down. Today, 64.5 percent of adult Americans (about 127 million) are categorized as being overweight or obese. Each year, obesity causes at least 300,000 excess deaths in the [place] and healthcare costs of American adults with obesity amount to approximately $100 billion. (AOA).    -While in the hospital routine blood work was performed.  This blood work showed that your HDL (good cholesterol).  A hemogoblin a1c was also measured which shows that you are prediabetic. When this information is combined with your weight, you qualify for a drug known as a statin.  Statins lower your risk of heart problems and strokes. The statin that you are being prescribed is Simvastatin.  Please take one tablet of Simvastatin 20mg by mouth once a day.  Please follow  up with your outpatient physician to discuss this problem.     PRINCIPAL DISCHARGE DIAGNOSIS  Diagnosis: COVID-19  Assessment and Plan of Treatment: You had signs and symptoms concerning for COVID-19. You tested postive on 11/23. You were treated with decadron while you were in the hospital. You have been clinically stable and deemed safe for discharge home to continue your treatment course and quarantine.  While you are at home you should stay in your own room whenever possible and wear a mask as much as possible. If you have to be in the same room as someone else, you should both wear a mask. If you share a restroom, you should wipe it down with bleach wipes between each use . Please continue to practice social distancing and good hand hygiene. You need to quarantine for 14 days after your positive test result.   You will also have a pulse oximeter device to help you monitor your oxygen levels and heart rate.  If you do experience worsening shortness of breath at home, start to experience new chest pain or new leg pain, please call your doctor and consider coming back.   You will de discharged with a medication known as Dexamethasone (also knonw as Ozurdex, DexPak).  Please take this medication after your discharge.  Please take one pill by mouth in the morning daily till completion of your course.      SECONDARY DISCHARGE DIAGNOSES  Diagnosis: Morbid obesity  Assessment and Plan of Treatment: Obesity is a disease that affects nearly one-third of the adult American population (approximately 60 million). The number of overweight and obese Americans has continued to increase since 1960, a trend that is not slowing down. Today, 64.5 percent of adult Americans (about 127 million) are categorized as being overweight or obese. Each year, obesity causes at least 300,000 excess deaths in the [place] and healthcare costs of American adults with obesity amount to approximately $100 billion. (AOA).    -While in the hospital routine blood work was performed.  This blood work showed that your HDL (good cholesterol) was slightly decreased.  A hemogoblin a1c was also measured which shows that you do not have diabetes, but that you classify as a pre-diabetic. When this information is combined with your other elements of your health, you qualify for a drug known as a statin.  Statins lower your risk of heart problems and strokes. The statin that you are being prescribed is Simvastatin.  Please take one tablet of Simvastatin 20mg by mouth once a day.  Please follow  up with your outpatient physician to discuss this problem.

## 2020-12-01 NOTE — PROGRESS NOTE ADULT - SUBJECTIVE AND OBJECTIVE BOX
Patient is a 70y old  Female who presents with a chief complaint of COVID (01 Dec 2020 10:46)      INTERVAL HPI/OVERNIGHT EVENTS:    Pt. seen and examined earlier today  Pt. feels better, reports less coughing  Ambulating; O2 sats > 94% on RA  Pt. reports feelings of loneliness re: COVID-19; emotional support provided    Review of Systems: 12 point review of systems otherwise negative    MEDICATIONS  (STANDING):  dexAMETHasone     Tablet 6 milliGRAM(s) Oral every 24 hours  enoxaparin Injectable 40 milliGRAM(s) SubCutaneous every 12 hours  insulin lispro (ADMELOG) corrective regimen sliding scale   SubCutaneous Before meals and at bedtime  levothyroxine 137 MICROGram(s) Oral daily  simvastatin 20 milliGRAM(s) Oral at bedtime    MEDICATIONS  (PRN):  acetaminophen   Tablet .. 650 milliGRAM(s) Oral every 6 hours PRN Temp greater or equal to 38C (100.4F), Mild Pain (1 - 3)  trimethobenzamide 300 milliGRAM(s) Oral every 12 hours PRN Nausea      Allergies    penicillin (Hives)  sulfa drugs (Rash)    Intolerances          Vital Signs Last 24 Hrs  T(C): 36.7 (01 Dec 2020 09:15), Max: 37.2 (30 Nov 2020 20:55)  T(F): 98.1 (01 Dec 2020 09:15), Max: 99 (30 Nov 2020 20:55)  HR: 80 (01 Dec 2020 09:15) (80 - 93)  BP: 130/65 (01 Dec 2020 09:15) (130/65 - 132/68)  BP(mean): --  RR: 18 (01 Dec 2020 09:15) (18 - 20)  SpO2: 95% (01 Dec 2020 09:15) (93% - 95%)  CAPILLARY BLOOD GLUCOSE      POCT Blood Glucose.: 158 mg/dL (01 Dec 2020 12:11)  POCT Blood Glucose.: 103 mg/dL (01 Dec 2020 06:34)  POCT Blood Glucose.: 106 mg/dL (30 Nov 2020 21:36)        Physical Exam:  (earlier today)  Daily     Daily   General:  well-appearing in NAD, sitting at the edge of her bed  HEENT: MMM  Lungs:  normal WOB on RA  Neuro:  AAOx3  [facility]    LABS:                        12.5   5.07  )-----------( 156      ( 01 Dec 2020 06:07 )             38.5     12-01    138  |  102  |  14  ----------------------------<  110<H>  3.5   |  21<L>  |  0.60    Ca    8.7      01 Dec 2020 06:07  Phos  2.7     12-01  Mg     1.7     12-01    TPro  6.6  /  Alb  3.5  /  TBili  0.4  /  DBili  x   /  AST  18  /  ALT  15  /  AlkPhos  54  12-01

## 2020-12-01 NOTE — DISCHARGE NOTE PROVIDER - CARE PROVIDER_API CALL
Jamir,   [address], [place] [address]  Phone: [phone]  Fax: (   )    -  Follow Up Time:    Jamir,   [address], [place] [address]  Phone: [phone]  Fax: (   )    -  Follow Up Time:     Hilario Hook  PULMONARY  Phone: [phone]  Fax: [phone]  Scheduled Appointment: 12/04/2020 03:20 PM   Hilario Hook  PULMONARY  Phone: [phone]  Fax: [phone]  Scheduled Appointment: 12/04/2020 03:20 PM    Del Whaley  INTERNAL MEDICINE  [address] [place] [address], [address] [place] [address]  Phone: [phone]  Fax: [phone]  Follow Up Time:

## 2020-12-03 DIAGNOSIS — Z87.891 PERSONAL HISTORY OF NICOTINE DEPENDENCE: ICD-10-CM

## 2020-12-03 DIAGNOSIS — K21.9 GASTRO-ESOPHAGEAL REFLUX DISEASE WITHOUT ESOPHAGITIS: ICD-10-CM

## 2020-12-03 DIAGNOSIS — J96.01 ACUTE RESPIRATORY FAILURE WITH HYPOXIA: ICD-10-CM

## 2020-12-03 DIAGNOSIS — E66.01 MORBID (SEVERE) OBESITY DUE TO EXCESS CALORIES: ICD-10-CM

## 2020-12-03 DIAGNOSIS — U07.1 COVID-19: ICD-10-CM

## 2020-12-03 DIAGNOSIS — E88.89 OTHER SPECIFIED METABOLIC DISORDERS: ICD-10-CM

## 2020-12-03 DIAGNOSIS — J12.89 OTHER VIRAL PNEUMONIA: ICD-10-CM

## 2020-12-03 DIAGNOSIS — R94.31 ABNORMAL ELECTROCARDIOGRAM [ECG] [EKG]: ICD-10-CM

## 2020-12-03 DIAGNOSIS — K46.9 UNSPECIFIED ABDOMINAL HERNIA WITHOUT OBSTRUCTION OR GANGRENE: ICD-10-CM

## 2020-12-03 DIAGNOSIS — Z88.2 ALLERGY STATUS TO SULFONAMIDES: ICD-10-CM

## 2020-12-03 DIAGNOSIS — Z88.0 ALLERGY STATUS TO PENICILLIN: ICD-10-CM

## 2020-12-03 DIAGNOSIS — E03.9 HYPOTHYROIDISM, UNSPECIFIED: ICD-10-CM

## 2020-12-04 ENCOUNTER — NON-APPOINTMENT (OUTPATIENT)
Age: 70
End: 2020-12-04

## 2020-12-04 ENCOUNTER — APPOINTMENT (OUTPATIENT)
Dept: PULMONOLOGY | Facility: CLINIC | Age: 70
End: 2020-12-04

## 2020-12-04 LAB
CULTURE RESULTS: SIGNIFICANT CHANGE UP
CULTURE RESULTS: SIGNIFICANT CHANGE UP
SPECIMEN SOURCE: SIGNIFICANT CHANGE UP
SPECIMEN SOURCE: SIGNIFICANT CHANGE UP

## 2020-12-04 RX ORDER — CLINDAMYCIN PHOSPHATE 10 MG/ML
1 SOLUTION TOPICAL
Qty: 60 | Refills: 0 | Status: DISCONTINUED | COMMUNITY
Start: 2020-06-11 | End: 2020-12-04

## 2020-12-07 NOTE — REVIEW OF SYSTEMS
[Fever] : no fever [Chills] : no chills [Dry Eyes] : no dry eyes [Eye Irritation] : no eye irritation [Cough] : no cough [Sputum] : no sputum [Chest Discomfort] : no chest discomfort [Orthopnea] : no orthopnea [Hay Fever] : no hay fever [Nasal Discharge] : no nasal discharge [GERD] : no gerd [Diarrhea] : no diarrhea [TextBox_151] : rest of ROS negative

## 2020-12-07 NOTE — HISTORY OF PRESENT ILLNESS
[Home] : at home, [unfilled] , at the time of the visit. [Medical Office: ([facility])___] : at the medical office located in  [Verbal consent obtained from patient] : the patient, [unfilled] [TextBox_4] : 70 year old female with  hypoxic respiratory failure (requiring non rebreather ) secondary to COVID-19. s/p remdesivir and Decadron 6mg QD x10 doses started on 11/30. Patient is still on dexamethasone. Patient is feeling better now. Denying SOB, cough, chest pain or fever. Patient's oxygen saturation was normal at home, done during televisit.\par

## 2020-12-07 NOTE — DISCUSSION/SUMMARY
[FreeTextEntry1] : 70 year old female with  hypoxic respiratory failure (requiring non rebreather ) secondary to COVID-19. s/p remdesivir and Decadron 6mg QD x10 doses started on 11/30. Patient is still on dexamethasone till 12/9/20. Patient is c/o minimal SOB on exertion. Ambulating oxygen saturation was normal today. Plan for follow up CXR in 8 weeks.

## 2020-12-08 ENCOUNTER — APPOINTMENT (OUTPATIENT)
Dept: INTERNAL MEDICINE | Facility: CLINIC | Age: 70
End: 2020-12-08
Payer: MEDICARE

## 2020-12-08 DIAGNOSIS — R19.7 DIARRHEA, UNSPECIFIED: ICD-10-CM

## 2020-12-08 PROCEDURE — 99443: CPT | Mod: CS,GC,95

## 2020-12-22 ENCOUNTER — APPOINTMENT (OUTPATIENT)
Dept: INTERNAL MEDICINE | Facility: CLINIC | Age: 70
End: 2020-12-22
Payer: MEDICARE

## 2020-12-22 DIAGNOSIS — J12.82 COVID-19: ICD-10-CM

## 2020-12-22 DIAGNOSIS — U07.1 COVID-19: ICD-10-CM

## 2020-12-22 PROCEDURE — 99213 OFFICE O/P EST LOW 20 MIN: CPT | Mod: CS,GC,95

## 2021-01-14 ENCOUNTER — APPOINTMENT (OUTPATIENT)
Dept: ULTRASOUND IMAGING | Facility: CLINIC | Age: 71
End: 2021-01-14
Payer: MEDICARE

## 2021-01-14 ENCOUNTER — OUTPATIENT (OUTPATIENT)
Dept: OUTPATIENT SERVICES | Facility: HOSPITAL | Age: 71
LOS: 1 days | End: 2021-01-14

## 2021-01-14 ENCOUNTER — APPOINTMENT (OUTPATIENT)
Dept: MAMMOGRAPHY | Facility: CLINIC | Age: 71
End: 2021-01-14
Payer: MEDICARE

## 2021-01-14 DIAGNOSIS — Z90.710 ACQUIRED ABSENCE OF BOTH CERVIX AND UTERUS: Chronic | ICD-10-CM

## 2021-01-14 DIAGNOSIS — Z98.890 OTHER SPECIFIED POSTPROCEDURAL STATES: Chronic | ICD-10-CM

## 2021-01-14 PROCEDURE — G0279: CPT | Mod: 26

## 2021-01-14 PROCEDURE — 76641 ULTRASOUND BREAST COMPLETE: CPT | Mod: 26,50

## 2021-01-14 PROCEDURE — 77066 DX MAMMO INCL CAD BI: CPT | Mod: 26

## 2021-01-21 ENCOUNTER — OUTPATIENT (OUTPATIENT)
Dept: OUTPATIENT SERVICES | Facility: HOSPITAL | Age: 71
LOS: 1 days | End: 2021-01-21

## 2021-01-21 ENCOUNTER — APPOINTMENT (OUTPATIENT)
Dept: RADIOLOGY | Facility: CLINIC | Age: 71
End: 2021-01-21
Payer: MEDICARE

## 2021-01-21 DIAGNOSIS — Z98.890 OTHER SPECIFIED POSTPROCEDURAL STATES: Chronic | ICD-10-CM

## 2021-01-21 DIAGNOSIS — Z90.710 ACQUIRED ABSENCE OF BOTH CERVIX AND UTERUS: Chronic | ICD-10-CM

## 2021-01-21 PROCEDURE — 71046 X-RAY EXAM CHEST 2 VIEWS: CPT | Mod: 26

## 2021-01-25 ENCOUNTER — APPOINTMENT (OUTPATIENT)
Dept: PULMONOLOGY | Facility: CLINIC | Age: 71
End: 2021-01-25
Payer: MEDICARE

## 2021-01-25 VITALS
BODY MASS INDEX: 35.03 KG/M2 | TEMPERATURE: 96.1 F | OXYGEN SATURATION: 96 % | DIASTOLIC BLOOD PRESSURE: 78 MMHG | HEIGHT: 66 IN | SYSTOLIC BLOOD PRESSURE: 118 MMHG | WEIGHT: 218 LBS | HEART RATE: 88 BPM

## 2021-01-25 DIAGNOSIS — Z86.16 PERSONAL HISTORY OF COVID-19: ICD-10-CM

## 2021-01-25 PROCEDURE — 99214 OFFICE O/P EST MOD 30 MIN: CPT

## 2021-01-25 RX ORDER — DEXAMETHASONE 6 MG/1
6 TABLET ORAL
Refills: 0 | Status: DISCONTINUED | COMMUNITY
End: 2021-01-25

## 2021-01-26 PROBLEM — Z86.16 HISTORY OF COVID-19: Status: ACTIVE | Noted: 2021-01-26

## 2021-01-26 NOTE — PHYSICAL EXAM
[No Acute Distress] : no acute distress [Well Nourished] : well nourished [Normal Oropharynx] : normal oropharynx [II] : Mallampati Class: II [Normal Appearance] : normal appearance [Supple] : supple [Normal Rate/Rhythm] : normal rate/rhythm [Normal S1, S2] : normal s1, s2 [No Resp Distress] : no resp distress [Clear to Auscultation Bilaterally] : clear to auscultation bilaterally [Benign] : benign [Normal Gait] : normal gait [Not Tender] : not tender [Gait - Sufficient For Exercise Testing] : gait sufficient for exercise testing [No Clubbing] : no clubbing [No Edema] : no edema [Normal Color/ Pigmentation] : normal color/ pigmentation [No Rash] : no rash [No Focal Deficits] : no focal deficits [No Sensory Deficits] : no sensory deficits [Oriented x3] : oriented x3 [Normal Affect] : normal affect

## 2021-01-26 NOTE — DISCUSSION/SUMMARY
[FreeTextEntry1] : COVID Pneumonia is resolved. CXr images were reviewed and shown to patient too. CXR opacities have resolved. No hypoxia on ambulation today. Pulmonary follow up as needed.

## 2021-01-26 NOTE — HISTORY OF PRESENT ILLNESS
[TextBox_4] : 70 year old female with  hypoxic respiratory failure (requiring non rebreather ) secondary to COVID-19. s/p remdesivir and Decadron 6mg QD x10 doses started on 11/30. Patient is still on dexamethasone. Patient is feeling better now. Denying SOB, cough, chest pain or fever. Patient's oxygen saturation was normal at home, done during televisit.\par \par 1/25/21:\par Patient is denying any pulmonary complaint. CXR was done few days back.\par

## 2021-03-02 NOTE — ED PROVIDER NOTE - CROS ED ENMT ALL NEG
Detail Level: Simple
Additional Notes: Patient consent was obtained to proceed with the visit and recommended plan of care after discussion of all risks and benefits, including the risks of COVID-19 exposure.
- - -

## 2021-04-12 ENCOUNTER — APPOINTMENT (OUTPATIENT)
Dept: ORTHOPEDIC SURGERY | Facility: CLINIC | Age: 71
End: 2021-04-12
Payer: MEDICARE

## 2021-04-12 VITALS — WEIGHT: 218 LBS | BODY MASS INDEX: 35.03 KG/M2 | HEIGHT: 66 IN | RESPIRATION RATE: 16 BRPM

## 2021-04-12 DIAGNOSIS — M17.11 UNILATERAL PRIMARY OSTEOARTHRITIS, RIGHT KNEE: ICD-10-CM

## 2021-04-12 DIAGNOSIS — M70.50 OTHER BURSITIS OF KNEE, UNSPECIFIED KNEE: ICD-10-CM

## 2021-04-12 PROCEDURE — 99213 OFFICE O/P EST LOW 20 MIN: CPT

## 2021-05-10 ENCOUNTER — APPOINTMENT (OUTPATIENT)
Dept: ORTHOPEDIC SURGERY | Facility: CLINIC | Age: 71
End: 2021-05-10

## 2021-05-18 NOTE — ED PROVIDER NOTE - MUSCULOSKELETAL NEGATIVE STATEMENT, MLM
5/18/2021       RE: Shahana Donaldson  [address]  [facility] [address]     Dear Colleague,    Thank you for referring your patient, Shahana Donaldson, to the [facility] DERMATOLOGIC SURGERY [facility]INIC [place] at [facility]. Please see a copy of my visit note below.    [facility] Dermatology Note  Encounter Date: May 18, 2021  Store-and-Forward and Telephone ([phone]). Location of teledermatologist: [facility]MATOLOG[facility] [place].  Start time: 2. End time: 2:10.    Dermatology Problem List:  1. BCC R wrist    ____________________________________________    Assessment & Plan:     BCC R wrist -- ~ 1 cm tumor in M risk area meets AUC.  Anticipate CLC.  Discussed R/B of Mohs.  Surgery is already scheduled.          Follow-up: for surgery    Staff:     Arnaud Castro MD    ____________________________________________    CC: Derm Problem (Pat having consult for BCC on right arm)    HPI:  Ms. Shahana Donaldson is a(n) 81 year old female who presents today as a new patient for BCC on R wrist    Patient is otherwise feeling well, without additional skin concerns.    Labs Reviewed:  Dermpath 4/7/21 - pigmented BCC    Hx of Skin Cancer: No  Hx of Mohs Surgery: No  Transplant: No  Immunocompromised: No  Current Anticoagulant(s): None  Bleeding Disorder(s): No  Stent: No  Pacemaker: No  Defibrillator: No  Brain/Nerve Stimulator: No  Endocarditis/Rheumatic Fever Hx: No  Vascular graft: No  Prophylactic Antibiotic Needed: No  Congenital heart defect: No  Prosthetic Heart Valve: No  Lesion on Leg/Groin: No  Prosthetic Joint : Yes (2019 )  Diabetic: No  HIV/AIDS: No  Hepatitis: No  Prior Problem with Local Anesthesia: No  Patient wears CPAP mask: No  Current Tobacco Use: No  Current Alcohol Use: No  Extended Care Facility: No  Occupation: Retired  Do you have mobility issues?: No  Do you use any assistive  devices/DME?: No  Do you have any issues with lying for long periods of time?: No      Medications:  Current Outpatient Medications   Medication     Acetaminophen (TYLENOL PO)     amLODIPine (NORVASC) 2.5 MG tablet     ammonium lactate (LAC-HYDRIN) 12 % external lotion     atorvastatin (LIPITOR) 10 MG tablet     calcium carbonate 500 mg, elemental, (OSCAL;OYSTER SHELL CALCIUM) 500 MG tablet     clobetasol (TEMOVATE) 0.05 % external ointment     diltiazem (CARTIA XT) 300 MG 24 hr capsule     docusate sodium (COLACE) 100 MG capsule     cholecalciferol (VITAMIN D3) 1000 UNIT tablet     clobetasol (TEMOVATE) 0.05 % ointment     diltiazem ER COATED BEADS (CARTIA XT) 300 MG 24 hr capsule     triamcinolone (KENALOG) 0.1 % external ointment     No current facility-administered medications for this visit.       Past Medical/Surgical History:   Patient Active Problem List   Diagnosis     Essential hypertension, benign     Mixed hyperlipidemia     Symptomatic menopausal or female climacteric states     Diverticulosis of large intestine     Hyperlipidemia     Coronary atherosclerosis     Esophageal reflux     vulvar cyst     Hiatal hernia     S/P Nissen fundoplication (without gastrostomy tube) procedure     History of tubal ligation     History of dilatation and curettage     History of hysterectomy     Lichen sclerosus     Left displaced femoral neck fracture (H)     Senile osteoporosis     Lichen planus     Seborrheic dermatitis     Past Medical History:   Diagnosis Date     Diverticulosis of colon (without mention of hemorrhage)      Essential hypertension, benign      Gastro-oesophageal reflux disease     nissen     Hemorrhoid      Nonsenile cataract      Osteoporosis      Other and unspecified hyperlipidemia      Rectocele      Symptomatic menopausal or female climacteric states        CC Rosamaria Estrada MD   DERMATOLOGY  [address] [place], [place] [id number] [phone] on close of this encounter.       no back pain, no gout, no musculoskeletal pain, no neck pain, and no weakness.

## 2021-05-20 ENCOUNTER — APPOINTMENT (OUTPATIENT)
Dept: ORTHOPEDIC SURGERY | Facility: CLINIC | Age: 71
End: 2021-05-20

## 2021-05-27 ENCOUNTER — APPOINTMENT (OUTPATIENT)
Dept: ORTHOPEDIC SURGERY | Facility: CLINIC | Age: 71
End: 2021-05-27

## 2021-07-20 ENCOUNTER — APPOINTMENT (OUTPATIENT)
Dept: CT IMAGING | Facility: CLINIC | Age: 71
End: 2021-07-20
Payer: MEDICARE

## 2021-07-20 ENCOUNTER — OUTPATIENT (OUTPATIENT)
Dept: OUTPATIENT SERVICES | Facility: HOSPITAL | Age: 71
LOS: 1 days | End: 2021-07-20

## 2021-07-20 DIAGNOSIS — Z98.890 OTHER SPECIFIED POSTPROCEDURAL STATES: Chronic | ICD-10-CM

## 2021-07-20 DIAGNOSIS — Z90.710 ACQUIRED ABSENCE OF BOTH CERVIX AND UTERUS: Chronic | ICD-10-CM

## 2021-07-20 PROCEDURE — 74176 CT ABD & PELVIS W/O CONTRAST: CPT | Mod: 26,MH

## 2021-10-06 PROBLEM — U07.1 PNEUMONIA DUE TO COVID-19 VIRUS: Status: ACTIVE | Noted: 2020-12-04

## 2021-11-30 ENCOUNTER — OUTPATIENT (OUTPATIENT)
Dept: OUTPATIENT SERVICES | Facility: HOSPITAL | Age: 71
LOS: 1 days | End: 2021-11-30

## 2021-11-30 ENCOUNTER — APPOINTMENT (OUTPATIENT)
Dept: RADIOLOGY | Facility: CLINIC | Age: 71
End: 2021-11-30
Payer: MEDICARE

## 2021-11-30 DIAGNOSIS — Z90.710 ACQUIRED ABSENCE OF BOTH CERVIX AND UTERUS: Chronic | ICD-10-CM

## 2021-11-30 DIAGNOSIS — Z98.890 OTHER SPECIFIED POSTPROCEDURAL STATES: Chronic | ICD-10-CM

## 2021-11-30 PROCEDURE — 77080 DXA BONE DENSITY AXIAL: CPT | Mod: 26

## 2022-06-20 NOTE — PATIENT PROFILE ADULT. - PRO INTERPRETER NEED 2
Occupational Therapy  Facility/Department: [facility] 2  Occupational Therapy Initial Assessment    Name: Placido Goldmann  : 1938  MRN: [id number]  Date of Service: 2022    Discharge Recommendations:  Patient would benefit from continued therapy after discharge          Patient Diagnosis(es): The encounter diagnosis was Chest pain, unspecified type. Past Medical History:  has a past medical history of Bleeding in brain due to blood pressure disorder ([facility] [address] CKD (chronic kidney disease) stage 2, GFR 60-89 ml/min, CKD (chronic kidney disease) stage 3, GFR 30-59 ml/min (HCC), Diverticulosis of colon, GERD (gastroesophageal reflux disease), Impaired renal function, MRSA (methicillin resistant staph aureus) culture positive, Osteoarthritis of knee, Parkinson disease ([facility] [address] Proteinuria, Skull fracture ([facility] [address] Temporary loss of eyesight, and Tubular adenoma of colon. Past Surgical History:  has a past surgical history that includes Colonoscopy (2012); shoulder surgery (Right); pr colsc flx w/rmvl of tumor polyp lesion snare tq (N/A, 2017); and Colonoscopy (2017). Assessment   Performance deficits / Impairments: Decreased functional mobility ; Decreased ADL status; Decreased endurance;Decreased balance;Decreased high-level IADLs;Decreased coordination;Decreased strength;Decreased safe awareness;Decreased cognition  Prognosis: Good  Decision Making: Medium Complexity  REQUIRES OT FOLLOW-UP: Yes  Activity Tolerance  Activity Tolerance: Patient Tolerated treatment well;Treatment limited secondary to decreased cognition;Patient limited by pain        Plan   Plan  Times per Week: 3-4 x/wk  Current Treatment Recommendations: Balance training,Functional mobility training,Endurance training,Safety education & training,Patient/Caregiver education & training,Equipment evaluation, education, & procurement,Self-Care / ADL,Home management training Restrictions  Restrictions/Precautions  Restrictions/Precautions: Up as Tolerated,Fall Risk,Contact Precautions  Required Braces or Orthoses?: No  Position Activity Restriction  Other position/activity restrictions: Hx. Parkinsons    Subjective   General  Patient assessed for rehabilitation services?: Yes  Family / Caregiver Present: No  Diagnosis: Frequent falls, DJD, hx. Parkinsons  General Comment  Comments: RN ok'd pt for OT eval this date. Pt agreeable to session and pleasant/cooperative throughout. Pt reports mild pain L chest, RN notified. Social/Functional History  Social/Functional History  Lives With: Alone  Type of Home: House  Home Layout: Two level,Able to Live on Main level with bedroom/bathroom,Laundry in basement  Home Access: Stairs to enter with rails  Entrance Stairs - Number of Steps: 5  Entrance Stairs - Rails: Both  Bathroom Shower/Tub: Walk-in shower  Bathroom Toilet: Standard  Bathroom Equipment: Shower chair  Home Equipment: Cane,Walker, rolling (pt reports using SPC at a baseline.)  ADL Assistance: Independent  Homemaking Assistance: Independent  Homemaking Responsibilities: Yes  Ambulation Assistance: Independent  Transfer Assistance: Independent  Active : Yes  Mode of Transportation: [employer]  Occupation: Retired  Type of Occupation:   Leisure & Hobbies: watching TV  Additional Comments: pt reports that neighbors are helpful and able to assist if needed. Pt returned to hospital from SNF this admission     Objective   SpO2: 98 %  O2 Device: Nasal cannula  Vision Exceptions: Wears glasses at all times  Hearing: Within functional limits          Safety Devices  Type of Devices: Gait belt;Call light within reach;Nurse notified; Left in chair;Chair alarm in place; Patient at risk for falls  Restraints  Restraints Initially in Place: No  Bed Mobility Training  Bed Mobility Training: No  Supine to Sit: Other (comment) (BUDDY as pt sitting in recliner upon arrival/exit this date, alarm on)  Sit to Supine: Other (comment)  Scooting: Stand-by assistance (In recliner)  Balance  Sitting: Intact  Standing: With support (SBA, stood for ~15 min total this date chairside/sinkside/func mob)  Transfer Training  Transfer Training: Yes  Sit to Stand: Contact-guard assistance  Stand to Sit: Stand-by assistance  Gait  Overall Level of Assistance: Contact-guard assistance  Speed/Ewa: Slow;Shuffled (Very shuffled on return trip from bathroom, hx Parkinsons)  Assistive Device: Cane, straight (Personal cane used)     AROM: Generally decreased, functional  PROM: Within functional limits  Strength: Generally decreased, functional (Baseline shoulder deficits)  Coordination: Generally decreased, functional (Tremors noted in BUE's)  Tone: Normal  Sensation: Intact  ADL  Feeding: Modified independent   Grooming: Stand by assistance;Verbal cueing; Increased time to complete  UE Bathing: Contact guard assistance; Increased time to complete  LE Bathing: Minimal assistance; Increased time to complete  UE Dressing: Contact guard assistance; Increased time to complete  LE Dressing: Moderate assistance; Increased time to complete  Toileting: Minimal assistance; Increased time to complete  Additional Comments: Pt moving slowly, B/L hand tremors noted with L tremor more often, pt stood/sat sinkside for full shaving and oral care activities, some vc's required     Activity Tolerance  Activity Tolerance: Patient limited by endurance; Patient tolerated treatment well;Patient limited by pain  Activity Tolerance Comments: Pt feeling slightly lightheaded today; per pt, RN gave patient pain meds for headache           Cognition  Overall Cognitive Status: Exceptions  Arousal/Alertness: Appropriate responses to stimuli  Following Commands: Follows multistep commands with repitition; Follows multistep commands with increased time  Attention Span: Attends with cues to redirect  Initiation: Requires cues for some  Sequencing: Requires cues for some  Cognition Comment: Pt difficult to understand at times, very friendly, overestimates abilities as pt states \"[place]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\", pt is 82 yo   Orientation: WFL                  Education Given To: Patient  Education Provided: Precautions; ADL Adaptive Strategies;Transfer Training;Role of Therapy;Plan of Care  Education Method: Demonstration;Verbal  Barriers to Learning: None  Education Outcome: Verbalized understanding;Continued education needed  LUE AROM (degrees)  LUE AROM : Exceptions  L Shoulder Flexion 0-180: Limited to 90 degrees-baseline  L Elbow Flexion 0-145: WFL  L Elbow Extension 145-0: WFL  RUE AROM (degrees)  RUE AROM : Exceptions  R Shoulder Flexion 0-180: Limited to 90 degrees-baseline  R Elbow Flexion 0-145: WFL  R Elbow Extension 145-0: [facility]                                                AM-PAC Score        AM-PAC Inpatient Daily Activity Raw Score: 17 (06/20/22 1529)  AM-PAC Inpatient ADL T-Scale Score : 37.26 (06/20/22 1529)  ADL Inpatient CMS 0-100% Score: 50.11 (06/20/22 1529)  ADL Inpatient CMS G-Code Modifier : CK (06/20/22 1529)    Goals  Short Term Goals  Time Frame for Short term goals: By discharge, pt will:  Short Term Goal 1: demo good safety awareness during func mob around room at mod I and using LRD PRN  Short Term Goal 2: demo ADL UB bathing/dressing activity at mod I and increased time  Short Term Goal 3: demo ADL LB bathing/dressing activity at SBA, using sock-aid/reacher PRN, and increased time       Therapy Time   Individual Concurrent Group Co-treatment   Time In 1411         Time Out 1453         Minutes 42         Timed Code Treatment Minutes: Elda Sharma 11, OTR/L English

## 2022-08-22 NOTE — PROGRESS NOTE ADULT - PROVIDER SPECIALTY LIST ADULT
ENT
NSICU
Physical Therapy      Patient Name:  Lynn Lantigua   MRN:  [id number]  Patient not seen today due to low H&H (6.7, 21.7), getting PRBC in dialysis.  PT to f/u.    
ENT
NSICU
Internal Medicine

## 2022-12-08 NOTE — ED CDU PROVIDER INITIAL DAY NOTE - ATTENDING CONTRIBUTION TO CARE
REMINDER: An FMLA/Disability form was recently sent to your office for review and signature.   Please complete, sign and fax to [phone]  as soon as possible.    Thank you,  Forms Completion Team.    Pt on observation for new onset SVT.  Now in NSR.  Received IV/PO Cardizem.  Will perform contnuous tele monitoring, serial cardiac enzymes, and cardiology eval in AM.

## 2024-06-17 NOTE — PATIENT PROFILE ADULT. - PMH
Detail Level: Detailed Detail Level: Generalized Abdominal hernia    Acid reflux    Hypothyroid    Mastoiditis

## 2025-04-15 NOTE — ED PROVIDER NOTE - NS ED ATTENDING STATEMENT MOD
Form faxed via right fax  
Katherine Caicedo, supervisor for [facility], states parent submitted a CMN for formula to Katherine from [employer]. The copy is blurry and not legible. Katherine is requesting a copy be faxed directly. There is something on file that they can receive medical records. Fax # is [phone]  Call Katherine on her cell at [phone]  
Attending Only